# Patient Record
Sex: MALE | Race: WHITE | NOT HISPANIC OR LATINO | Employment: UNEMPLOYED | URBAN - METROPOLITAN AREA
[De-identification: names, ages, dates, MRNs, and addresses within clinical notes are randomized per-mention and may not be internally consistent; named-entity substitution may affect disease eponyms.]

---

## 2017-04-05 ENCOUNTER — HOSPITAL ENCOUNTER (OUTPATIENT)
Dept: LAB | Facility: MEDICAL CENTER | Age: 62
End: 2017-04-05
Attending: NURSE PRACTITIONER
Payer: MEDICAID

## 2017-04-05 DIAGNOSIS — E78.49 OTHER HYPERLIPIDEMIA: ICD-10-CM

## 2017-04-05 DIAGNOSIS — Z13.29 SCREENING FOR THYROID DISORDER: ICD-10-CM

## 2017-04-05 DIAGNOSIS — E11.9 DIABETES MELLITUS WITHOUT COMPLICATION (HCC): ICD-10-CM

## 2017-04-05 DIAGNOSIS — R97.20 ELEVATED PSA: ICD-10-CM

## 2017-04-05 DIAGNOSIS — Z13.21 ENCOUNTER FOR VITAMIN DEFICIENCY SCREENING: ICD-10-CM

## 2017-04-05 LAB
25(OH)D3 SERPL-MCNC: 20 NG/ML (ref 30–100)
ALBUMIN SERPL BCP-MCNC: 4.4 G/DL (ref 3.2–4.9)
ALBUMIN/GLOB SERPL: 1.4 G/DL
ALP SERPL-CCNC: 89 U/L (ref 30–99)
ALT SERPL-CCNC: 29 U/L (ref 2–50)
ANION GAP SERPL CALC-SCNC: 14 MMOL/L (ref 0–11.9)
AST SERPL-CCNC: 20 U/L (ref 12–45)
BILIRUB SERPL-MCNC: 0.6 MG/DL (ref 0.1–1.5)
BUN SERPL-MCNC: 16 MG/DL (ref 8–22)
CALCIUM SERPL-MCNC: 10 MG/DL (ref 8.5–10.5)
CHLORIDE SERPL-SCNC: 100 MMOL/L (ref 96–112)
CHOLEST SERPL-MCNC: 221 MG/DL (ref 100–199)
CO2 SERPL-SCNC: 22 MMOL/L (ref 20–33)
CREAT SERPL-MCNC: 1.11 MG/DL (ref 0.5–1.4)
CREAT UR-MCNC: 144.5 MG/DL
EST. AVERAGE GLUCOSE BLD GHB EST-MCNC: 303 MG/DL
GFR SERPL CREATININE-BSD FRML MDRD: >60 ML/MIN/1.73 M 2
GLOBULIN SER CALC-MCNC: 3.2 G/DL (ref 1.9–3.5)
GLUCOSE SERPL-MCNC: 265 MG/DL (ref 65–99)
HBA1C MFR BLD: 12.2 % (ref 0–5.6)
HDLC SERPL-MCNC: 47 MG/DL
LDLC SERPL CALC-MCNC: 124 MG/DL
MICROALBUMIN UR-MCNC: 7.6 MG/DL
MICROALBUMIN/CREAT UR: 53 MG/G (ref 0–30)
POTASSIUM SERPL-SCNC: 3.9 MMOL/L (ref 3.6–5.5)
PROT SERPL-MCNC: 7.6 G/DL (ref 6–8.2)
PSA SERPL-MCNC: 7.08 NG/ML (ref 0–4)
SODIUM SERPL-SCNC: 136 MMOL/L (ref 135–145)
TRIGL SERPL-MCNC: 250 MG/DL (ref 0–149)
TSH SERPL DL<=0.005 MIU/L-ACNC: 1.36 UIU/ML (ref 0.3–3.7)

## 2017-04-05 PROCEDURE — 84443 ASSAY THYROID STIM HORMONE: CPT

## 2017-04-05 PROCEDURE — 36415 COLL VENOUS BLD VENIPUNCTURE: CPT

## 2017-04-05 PROCEDURE — 82306 VITAMIN D 25 HYDROXY: CPT

## 2017-04-05 PROCEDURE — 80061 LIPID PANEL: CPT

## 2017-04-05 PROCEDURE — 82570 ASSAY OF URINE CREATININE: CPT

## 2017-04-05 PROCEDURE — 82043 UR ALBUMIN QUANTITATIVE: CPT

## 2017-04-05 PROCEDURE — 80053 COMPREHEN METABOLIC PANEL: CPT

## 2017-04-05 PROCEDURE — 83036 HEMOGLOBIN GLYCOSYLATED A1C: CPT

## 2017-04-05 PROCEDURE — 84153 ASSAY OF PSA TOTAL: CPT

## 2017-04-10 RX ORDER — INSULIN GLARGINE 100 [IU]/ML
INJECTION, SOLUTION SUBCUTANEOUS
Qty: 10 ML | Refills: 2 | Status: SHIPPED | OUTPATIENT
Start: 2017-04-10 | End: 2017-04-18 | Stop reason: SDUPTHER

## 2017-04-18 ENCOUNTER — OFFICE VISIT (OUTPATIENT)
Dept: MEDICAL GROUP | Facility: MEDICAL CENTER | Age: 62
End: 2017-04-18
Attending: NURSE PRACTITIONER
Payer: MEDICAID

## 2017-04-18 VITALS
HEART RATE: 100 BPM | BODY MASS INDEX: 28.12 KG/M2 | DIASTOLIC BLOOD PRESSURE: 90 MMHG | SYSTOLIC BLOOD PRESSURE: 140 MMHG | OXYGEN SATURATION: 96 % | RESPIRATION RATE: 16 BRPM | WEIGHT: 196 LBS | TEMPERATURE: 97.6 F

## 2017-04-18 DIAGNOSIS — Z79.4 TYPE 2 DIABETES MELLITUS WITHOUT COMPLICATION, WITH LONG-TERM CURRENT USE OF INSULIN (HCC): ICD-10-CM

## 2017-04-18 DIAGNOSIS — E11.9 TYPE 2 DIABETES MELLITUS WITHOUT COMPLICATION, WITH LONG-TERM CURRENT USE OF INSULIN (HCC): ICD-10-CM

## 2017-04-18 DIAGNOSIS — E55.9 VITAMIN D DEFICIENCY: ICD-10-CM

## 2017-04-18 DIAGNOSIS — R97.20 ELEVATED PSA: ICD-10-CM

## 2017-04-18 DIAGNOSIS — I10 ESSENTIAL HYPERTENSION: ICD-10-CM

## 2017-04-18 DIAGNOSIS — E11.9 DIABETES MELLITUS WITHOUT COMPLICATION (HCC): ICD-10-CM

## 2017-04-18 DIAGNOSIS — E78.49 OTHER HYPERLIPIDEMIA: ICD-10-CM

## 2017-04-18 PROCEDURE — 99213 OFFICE O/P EST LOW 20 MIN: CPT | Performed by: NURSE PRACTITIONER

## 2017-04-18 PROCEDURE — 99214 OFFICE O/P EST MOD 30 MIN: CPT | Performed by: NURSE PRACTITIONER

## 2017-04-18 RX ORDER — IBUPROFEN 200 MG
TABLET ORAL
Qty: 30 EACH | Refills: 5 | Status: SHIPPED | OUTPATIENT
Start: 2017-04-18 | End: 2018-04-02

## 2017-04-18 RX ORDER — LISINOPRIL 40 MG/1
40 TABLET ORAL DAILY
Qty: 30 TAB | Refills: 3 | Status: SHIPPED | OUTPATIENT
Start: 2017-04-18 | End: 2018-01-05 | Stop reason: SDUPTHER

## 2017-04-18 RX ORDER — LANCETS
1 EACH MISCELLANEOUS DAILY
Qty: 50 EACH | Refills: 3 | Status: SHIPPED | OUTPATIENT
Start: 2017-04-18 | End: 2018-04-02 | Stop reason: SDUPTHER

## 2017-04-18 RX ORDER — ATORVASTATIN CALCIUM 20 MG/1
20 TABLET, FILM COATED ORAL DAILY
Qty: 30 TAB | Refills: 5 | Status: SHIPPED | OUTPATIENT
Start: 2017-04-18 | End: 2018-04-02 | Stop reason: SDUPTHER

## 2017-04-18 RX ORDER — BLOOD-GLUCOSE METER
1 EACH MISCELLANEOUS DAILY
Qty: 1 DEVICE | Refills: 0 | Status: SHIPPED | OUTPATIENT
Start: 2017-04-18 | End: 2018-08-09

## 2017-04-18 RX ORDER — INSULIN GLARGINE 100 [IU]/ML
30 INJECTION, SOLUTION SUBCUTANEOUS
Qty: 10 ML | Refills: 5 | Status: SHIPPED | OUTPATIENT
Start: 2017-04-18 | End: 2018-04-02

## 2017-04-18 RX ORDER — SYRINGE AND NEEDLE,INSULIN,1ML 29 G X1/2"
SYRINGE, EMPTY DISPOSABLE MISCELLANEOUS
Qty: 30 EACH | Refills: 2 | Status: SHIPPED | OUTPATIENT
Start: 2017-04-18 | End: 2018-08-09

## 2017-04-18 ASSESSMENT — PAIN SCALES - GENERAL: PAINLEVEL: NO PAIN

## 2017-04-18 NOTE — ASSESSMENT & PLAN NOTE
WE discussed his recent lipid panel showing   Results for HOSSEIN PRINCE (MRN [id number]) as of 4/18/2017 13:03   Ref. Range 4/5/2017 15:16   Cholesterol,Tot Latest Ref Range: 100-199 mg/dL 221 (H)   Triglycerides Latest Ref Range: 0-149 mg/dL 250 (H)   HDL Latest Ref Range: >=40 mg/dL 47   LDL Latest Ref Range: <100 mg/dL 124 (H)     He reports he is taking Atorvastatin 10 mg/day.  Denies myalgias.

## 2017-04-18 NOTE — MR AVS SNAPSHOT
[place]   2017 1:10 PM   Office Visit   MRN: [id number]    [facility]   Dept Phone:  [phone]    Description:  Male : 1955   Provider:  NAE Davis           Reason for Visit     Follow-Up diabet      Allergies as of 2017     No Known Allergies      You were diagnosed with     Elevated PSA   [983137]       Other hyperlipidemia   [3716205]       Essential hypertension   [0076019]       Vitamin D deficiency   [0255402]       Type 2 diabetes mellitus without complication, with long-term current use of insulin (CMS-[facility])   [4993335]       Diabetes mellitus without complication (CMS-[facility])   [431309]         Vital Signs     Blood Pressure Pulse Temperature Respirations Weight Oxygen Saturation    140/90 mmHg 100 36.4 °C (97.6 °F) 16 88.905 kg (196 lb) 96%    Smoking Status                   Never Smoker            Basic Information     Date Of Birth Sex Race Ethnicity Preferred Language    1955 Male White Non- English      Problem List              ICD-10-CM Priority Class Noted - Resolved    Type 2 diabetes mellitus without complication (CMS-[facility]) E11.9   2014 - Present    Hypertension I10   2014 - Present    Varicose veins of right lower extremity with pain I83.811   2014 - Present    Excessive cerumen in left ear canal H61.22   2014 - Present    Hyperlipidemia E78.5   2015 - Present    Elevated PSA R97.20   2015 - Present    Ringing in the ears H93.19   2015 - Present    Cold J00   2015 - Present    Vertigo R42   2016 - Present    Vitamin D deficiency E55.9   2017 - Present      Health Maintenance        Date Due Completion Dates    DIABETES MONOFILAMENT / LE EXAM 1956 ---    RETINAL SCREENING 10/17/1972 ---    IMM DTaP/Tdap/Td Vaccine (1 - Tdap) 10/17/1973 ---    IMM PNEUMOCOCCAL 19-64 (ADULT) MEDIUM RISK SERIES (1 of 1 - PPSV23) 10/17/1973 ---    IMM ZOSTER VACCINE 10/17/2014 ---    COLON CANCER SCREENING ANNUAL FIT 3/2/2017 3/2/2016, 1/22/2015    A1C SCREENING 10/5/2017 4/5/2017, 8/31/2016, 6/9/2015, 1/27/2015    FASTING LIPID PROFILE 4/5/2018 4/5/2017, 8/31/2016, 1/27/2015    URINE ACR / MICROALBUMIN 4/5/2018 4/5/2017, 1/29/2015    SERUM CREATININE 4/5/2018 4/5/2017, 8/31/2016, 1/27/2015, 6/3/2006            Current Immunizations     Influenza Vaccine Quad Inj (Preserved) 10/22/2015      Below and/or attached are the medications your provider expects you to take. Review all of your home medications and newly ordered medications with your provider and/or pharmacist. Follow medication instructions as directed by your provider and/or pharmacist. Please keep your medication list with you and share with your provider. Update the information when medications are discontinued, doses are changed, or new medications (including over-the-counter products) are added; and carry medication information at all times in the event of emergency situations     Allergies:  No Known Allergies          Medications  Valid as of: April 18, 2017 -  1:24 PM    Generic Name Brand Name Tablet Size Instructions for use    Atorvastatin Calcium (Tab) LIPITOR 20 MG Take 1 Tab by mouth every day.        Blood Glucose Monitoring Suppl (Misc) Blood Glucose Monitoring Suppl SUPPLIES DM testing supplies: glucometer, test strips, lancets  Dispense brand covered by patients insurance  Testing frequency: BID, fasting  250.00        Blood Glucose Monitoring Suppl (Device) PRECISION QID MONITOR  by Does not apply route.        Blood Glucose Monitoring Suppl (Device) TRUE METRIX AIR GLUCOSE METER  1 Each by Does not apply route every day.        Cholecalciferol (Cap) Cholecalciferol 2000 UNIT Take 1 Cap by mouth every day.        Glucose Blood (Strip) glucose blood  1 Strip by Other route every day.        Glucose Blood (Strip) glucose blood  1 Each by Other route as needed.        Glucose Blood (Strip) glucose blood  1 Strip by Other  "route as needed.        Insulin Glargine (Solution) LANTUS 100 UNIT/ML Inject 20 Units as instructed every bedtime.        Insulin Glargine (Solution) LANTUS 100 UNIT/ML Inject 30 Units as instructed every bedtime.        Insulin Syringe-Needle U-100 (Misc) INSULIN SYRINGE .5CC/29G 29G X 1/2\" 0.5 ML Pt to use with Lantus Insulin, one injection every night        Lancets (Misc) TECHLITE LANCETS  1 Each by In Vitro route every day.        Lisinopril (Tab) PRINIVIL, ZESTRIL 40 MG Take 1 Tab by mouth every day.        Meclizine HCl (Tab) ANTIVERT 25 MG Take 1 Tab by mouth at bedtime as needed for Dizziness or Vertigo.        MetFORMIN HCl (Tab) GLUCOPHAGE 850 MG Take 1 Tab by mouth 3 times a day, with meals.        .                 Medicines prescribed today were sent to:     [facility] [address] [address]    Phone: [phone] Fax: [phone]    Open 24 Hours?: No      Medication refill instructions:       If your prescription bottle indicates you have medication refills left, it is not necessary to call your provider’s office. Please contact your pharmacy and they will refill your medication.    If your prescription bottle indicates you do not have any refills left, you may request refills at any time through one of the following ways: The online [employer] system (except Urgent Care), by calling your provider’s office, or by asking your pharmacy to contact your provider’s office with a refill request. Medication refills are processed only during regular business hours and may not be available until the next business day. Your provider may request additional information or to have a follow-up visit with you prior to refilling your medication.   *Please Note: Medication refills are assigned a new Rx number when refilled electronically. Your pharmacy may indicate that no refills were authorized even though a new prescription for the same medication is available at " the pharmacy. Please request the medicine by name with the pharmacy before contacting your provider for a refill.           MyChart Status: Patient Declined

## 2017-04-18 NOTE — ASSESSMENT & PLAN NOTE
Pt has known elevated PSA but in the past has refused to see Urologist. I reviewed his recent PSA= 7.08 and recommended referral to urology for check for prostate cancer. We discussed this and patient not interested in Urology referral.  Denies dysuria or any trouble in urination.

## 2017-04-18 NOTE — ASSESSMENT & PLAN NOTE
/90 in clinic today. Known hx of HTN. . Pt stating that they have been taking their medication (lisinopril) as prescribed without adverse effect except did not take his BP med today. Pt denies HA, vision changes, neurologic deficits, CP, SOB.

## 2017-04-18 NOTE — PROGRESS NOTES
"    Chief Complaint: DM follow up, Glucometer not working.    HPI:  Louis presents to the clinic for follow up on multiple medical conditions.    His PMH includes:  DM-2, poor control.  Hyperlipidemia  HTN  Elevated PSA but Pt not amenable to further eval or referral to urology  Cerumen Impactions  Ringing in Ears  Varicose Vein of RLE    Review of Records show  12/14/16 Clinic Visit for DM check, Increased Lantus to 30 units at hs.  9/12/16 clinic Visit for Results Review, Pt to continue Metformin  850 mgTID and increase Lantus at hs to 25 units and Increase lantus 2 units every 2 days if BS maintaining >200. Stop titration if having episodes of hypoglycemia.  5/23/16 Last Clinic Visit to re-establish as Pt had been in [facility] x 6 months and missed his f/u appt's until May, 2016.--> labs ordered    [place]  Report shows  No entries    Results Review-  Most Recent Labs- 4/5/17  CMP normal except BS= 265, GFR good, A1C= 12.2 ( 303 Avg)  TSH= 1.360  Cholesterol Total= 221, Triglycerides= 250, HDL= 47, LDL= 124, Microalbumin/Cr Ratio= 53  Vitamin D= 20, PSA= 7.08     Past Labs  8/31/16 Labs: CBC normal except slight decr Hgb= 13.8, CMP normal except fasting BS= 123,                          A1C= 13.8 ( ~ 349 avg BS), Cholesterol Total= 195, Triglycerides= 110, HDL= 52, LDL= 121        Type 2 diabetes mellitus without complication (CMS-[facility])  Pt has known DM-2.   During his Dec visit here we increased his Lantus to 30 units at hs, and instructed him to continue Metformin 850  Sometimes \"2-3 x per day\"    Today I reviewed his lab work including A1C= 12.2 ( an improvement from 8/31/16 A1C= 13.8) but still poorly controlled.    Pt reports he is not testing BSin morning as \"can't\"  As he has 3 meters but \"none work\". Shows me [place], TrueResult and Precision Xtra meter and varied test strips.       Denies any known lows    Elevated PSA  Pt has known elevated PSA but in the past has refused to see Urologist. I reviewed his " recent PSA= 7.08 and recommended referral to urology for check for prostate cancer. We discussed this and patient not interested in Urology referral.  Denies dysuria or any trouble in urination.    Hyperlipidemia  WE discussed his recent lipid panel showing   Results for HOSSEIN PRINCE (MRN [id number]) as of 4/18/2017 13:03   Ref. Range 4/5/2017 15:16   Cholesterol,Tot Latest Ref Range: 100-199 mg/dL 221 (H)   Triglycerides Latest Ref Range: 0-149 mg/dL 250 (H)   HDL Latest Ref Range: >=40 mg/dL 47   LDL Latest Ref Range: <100 mg/dL 124 (H)     He reports he is taking Atorvastatin 10 mg/day.  Denies myalgias.    Hypertension  /90 in clinic today. Known hx of HTN. . Pt stating that they have been taking their medication (lisinopril) as prescribed without adverse effect except did not take his BP med today. Pt denies HA, vision changes, neurologic deficits, CP, SOB.     Vitamin D deficiency  We discussed his low vitamin d level and need for supplement  Pt agrees.      Patient Active Problem List    Diagnosis Date Noted   • Vitamin D deficiency 04/18/2017   • Vertigo 05/23/2016   • Cold 09/17/2015   • Ringing in the ears 06/09/2015   • Hyperlipidemia 04/09/2015   • Elevated PSA 04/09/2015   • Type 2 diabetes mellitus without complication (CMS-[facility]) 11/06/2014   • Hypertension 11/06/2014   • Varicose veins of right lower extremity with pain 11/06/2014   • Excessive cerumen in left ear canal 11/06/2014       Allergies:Review of patient's allergies indicates no known allergies.    Current Outpatient Prescriptions   Medication Sig Dispense Refill   • atorvastatin (LIPITOR) 20 MG Tab Take 1 Tab by mouth every day. 30 Tab 5   • Cholecalciferol 2000 UNIT Cap Take 1 Cap by mouth every day. 30 Cap 11   • insulin glargine (LANTUS) 100 UNIT/ML Solution Inject 30 Units as instructed every bedtime. 10 mL 5   • TECHLITE LANCETS Misc 1 Each by In Vitro route every day. 50 Each 3   • metformin (GLUCOPHAGE) 850 MG Tab Take 1 Tab  "by mouth 3 times a day, with meals. 90 Tab 3   • lisinopril (PRINIVIL, ZESTRIL) 40 MG tablet Take 1 Tab by mouth every day. 30 Tab 3   • Blood Glucose Monitoring Suppl (TRUE METRIX AIR GLUCOSE METER) Device 1 Each by Does not apply route every day. 1 Device 0   • glucose blood (TRUE METRIX BLOOD GLUCOSE TEST) strip 1 Strip by Other route as needed. 50 Strip 5   • INSULIN SYRINGE .5CC/29G 29G X 1/2\" 0.5 ML Misc Pt to use with Lantus Insulin, one injection every night 30 Each 5   • insulin glargine (LANTUS) 100 UNIT/ML Solution Inject 20 Units as instructed every bedtime. 10 mL 3   • meclizine (ANTIVERT) 25 MG Tab Take 1 Tab by mouth at bedtime as needed for Dizziness or Vertigo. 20 Tab 1   • Blood Glucose Monitoring Suppl SUPPLIES MISC DM testing supplies: glucometer, test strips, lancets  Dispense brand covered by patients insurance  Testing frequency: BID, fasting  250.00 1 Each 11   • Blood Glucose Monitoring Suppl (PRECISION QID MONITOR) Device by Does not apply route.     • glucose blood (PRECISION QID TEST) strip 1 Each by Other route as needed.     • glucose blood (TRUETEST TEST) strip 1 Strip by Other route every day. 30 Strip 3     No current facility-administered medications for this visit.       Social History   Substance Use Topics   • Smoking status: Never Smoker    • Smokeless tobacco: Never Used   • Alcohol Use: Yes      Comment: once in a while. A couple beers per week.       Family History   Problem Relation Age of Onset   • Stroke Mother    • Heart Disease Mother    • Diabetes Mother    • Lung Disease Father        ROS:  Review of Systems   See HPI Above        Exam:  Blood pressure 140/90, pulse 100, temperature 36.4 °C (97.6 °F), resp. rate 16, weight 88.905 kg (196 lb), SpO2 96 %.  General:  Well nourished, well developed male in NAD  HENT:Head is grossly normal. PERRL.  Neck: Supple. Trachea is midline.  Pulmonary: Clear to ausculation .  Normal effort. No rales, ronchi, or wheezing. " "  Cardiovascular: Regular rate and rhythm.  Abdomen-Abdomen is soft, No tenderness.  Upper extremities- Strong = . Good ROM  Lower extremities- neg for edema, redness, tenderness.  Neuro- A & O x 4. Speech clear and slow but appropriate.     Current medications, allergies, and problem list reviewed with patient and updated in  [facility] today.    Assessment/Plan:  1. Elevated PSA  Pt refuses referral to Urology despite discussion of dangers or prostate CA.   2. Other hyperlipidemia  atorvastatin (LIPITOR) 20 MG Tab- increase in dose   3. Essential hypertension  lisinopril (PRINIVIL, ZESTRIL) 40 MG tablet   4. Vitamin D deficiency  Cholecalciferol 2000 UNIT Cap-START   5. Type 2 diabetes mellitus without complication, with long-term current use of insulin (CMS-HCC)  TECHLITE LANCETS Misc    metformin (GLUCOPHAGE) 850 MG Tab   6. Diabetes mellitus without complication (CMS-HCC)  insulin glargine (LANTUS) 100 UNIT/ML Solution  Test every morning fasting BS and log.  Continue Lantus at 30 units at hs. And be more consistent with Metformin TID. Decrease sugar and carb intake.    TECHLITE LANCETS Misc    Blood Glucose Monitoring Suppl (TRUE METRIX AIR GLUCOSE METER) Device    glucose blood (TRUE METRIX BLOOD GLUCOSE TEST) strip    INSULIN SYRINGE .5CC/29G 29G X 1/2\" 0.5 ML Misc   Follow up in 2 months. Call or return if questions, concerns, or worsening condition.  "

## 2017-04-18 NOTE — ASSESSMENT & PLAN NOTE
"Pt has known DM-2.   During his Dec visit here we increased his Lantus to 30 units at hs, and instructed him to continue Metformin 850  Sometimes \"2-3 x per day\"    Today I reviewed his lab work including A1C= 12.2 ( an improvement from 8/31/16 A1C= 13.8) but still poorly controlled.    Pt reports he is not testing BSin morning as \"can't\"  As he has 3 meters but \"none work\". Shows me [place], TrueResult and Precision Xtra meter and varied test strips.       Denies any known lows  "

## 2017-05-01 DIAGNOSIS — Z79.4 TYPE 2 DIABETES MELLITUS WITHOUT COMPLICATION, WITH LONG-TERM CURRENT USE OF INSULIN (HCC): ICD-10-CM

## 2017-05-01 DIAGNOSIS — E11.9 TYPE 2 DIABETES MELLITUS WITHOUT COMPLICATION, WITH LONG-TERM CURRENT USE OF INSULIN (HCC): ICD-10-CM

## 2017-05-01 RX ORDER — SYRINGE AND NEEDLE,INSULIN,1ML 29 G X1/2"
SYRINGE, EMPTY DISPOSABLE MISCELLANEOUS
Qty: 0 EACH | Refills: 1 | OUTPATIENT
Start: 2017-05-01

## 2017-05-01 RX ORDER — PEN NEEDLE, DIABETIC 30 GX5/16"
1 NEEDLE, DISPOSABLE MISCELLANEOUS DAILY
Qty: 50 EACH | Refills: 2 | Status: SHIPPED | OUTPATIENT
Start: 2017-05-01 | End: 2017-11-29 | Stop reason: SDUPTHER

## 2017-11-29 DIAGNOSIS — Z79.4 TYPE 2 DIABETES MELLITUS WITHOUT COMPLICATION, WITH LONG-TERM CURRENT USE OF INSULIN (HCC): ICD-10-CM

## 2017-11-29 DIAGNOSIS — E11.9 TYPE 2 DIABETES MELLITUS WITHOUT COMPLICATION, WITH LONG-TERM CURRENT USE OF INSULIN (HCC): ICD-10-CM

## 2017-11-30 RX ORDER — FLURBIPROFEN SODIUM 0.3 MG/ML
SOLUTION/ DROPS OPHTHALMIC
Qty: 100 EACH | Refills: 2 | Status: SHIPPED | OUTPATIENT
Start: 2017-11-30 | End: 2018-04-02 | Stop reason: SDUPTHER

## 2017-11-30 RX ORDER — INSULIN GLARGINE 100 [IU]/ML
INJECTION, SOLUTION SUBCUTANEOUS
Qty: 15 ML | Refills: 2 | Status: SHIPPED | OUTPATIENT
Start: 2017-11-30 | End: 2018-04-02 | Stop reason: SDUPTHER

## 2018-01-05 DIAGNOSIS — I10 ESSENTIAL HYPERTENSION: ICD-10-CM

## 2018-01-08 RX ORDER — LISINOPRIL 40 MG/1
TABLET ORAL
Qty: 30 TAB | Refills: 4 | Status: SHIPPED | OUTPATIENT
Start: 2018-01-08 | End: 2018-04-02 | Stop reason: SDUPTHER

## 2018-04-02 ENCOUNTER — OFFICE VISIT (OUTPATIENT)
Dept: MEDICAL GROUP | Facility: MEDICAL CENTER | Age: 63
End: 2018-04-02
Attending: NURSE PRACTITIONER
Payer: MEDICAID

## 2018-04-02 VITALS
DIASTOLIC BLOOD PRESSURE: 88 MMHG | OXYGEN SATURATION: 98 % | HEIGHT: 71 IN | HEART RATE: 16 BPM | RESPIRATION RATE: 20 BRPM | TEMPERATURE: 98 F | SYSTOLIC BLOOD PRESSURE: 140 MMHG | BODY MASS INDEX: 28.7 KG/M2 | WEIGHT: 205 LBS

## 2018-04-02 DIAGNOSIS — E55.9 VITAMIN D DEFICIENCY: ICD-10-CM

## 2018-04-02 DIAGNOSIS — I10 ESSENTIAL HYPERTENSION: ICD-10-CM

## 2018-04-02 DIAGNOSIS — E78.49 OTHER HYPERLIPIDEMIA: ICD-10-CM

## 2018-04-02 DIAGNOSIS — E11.9 TYPE 2 DIABETES MELLITUS WITHOUT COMPLICATION, WITH LONG-TERM CURRENT USE OF INSULIN (HCC): ICD-10-CM

## 2018-04-02 DIAGNOSIS — Z13.21 ENCOUNTER FOR VITAMIN DEFICIENCY SCREENING: ICD-10-CM

## 2018-04-02 DIAGNOSIS — Z79.4 TYPE 2 DIABETES MELLITUS WITHOUT COMPLICATION, WITH LONG-TERM CURRENT USE OF INSULIN (HCC): ICD-10-CM

## 2018-04-02 DIAGNOSIS — Z13.29 SCREENING FOR THYROID DISORDER: ICD-10-CM

## 2018-04-02 DIAGNOSIS — E11.9 DIABETES MELLITUS WITHOUT COMPLICATION (HCC): ICD-10-CM

## 2018-04-02 DIAGNOSIS — R09.81 NASAL CONGESTION: ICD-10-CM

## 2018-04-02 PROCEDURE — 99213 OFFICE O/P EST LOW 20 MIN: CPT | Performed by: NURSE PRACTITIONER

## 2018-04-02 PROCEDURE — 99214 OFFICE O/P EST MOD 30 MIN: CPT | Performed by: NURSE PRACTITIONER

## 2018-04-02 RX ORDER — INSULIN GLARGINE 100 [IU]/ML
30 INJECTION, SOLUTION SUBCUTANEOUS
Qty: 10 ML | Refills: 5 | Status: CANCELLED | OUTPATIENT
Start: 2018-04-02

## 2018-04-02 RX ORDER — LORATADINE 10 MG/1
10 TABLET ORAL DAILY
Qty: 30 TAB | Refills: 2 | Status: SHIPPED | OUTPATIENT
Start: 2018-04-02 | End: 2018-10-10 | Stop reason: SDUPTHER

## 2018-04-02 RX ORDER — LANCETS
1 EACH MISCELLANEOUS DAILY
Qty: 50 EACH | Refills: 5 | Status: SHIPPED | OUTPATIENT
Start: 2018-04-02 | End: 2018-08-09

## 2018-04-02 RX ORDER — ATORVASTATIN CALCIUM 20 MG/1
20 TABLET, FILM COATED ORAL DAILY
Qty: 30 TAB | Refills: 5 | Status: SHIPPED | OUTPATIENT
Start: 2018-04-02 | End: 2018-10-10 | Stop reason: SDUPTHER

## 2018-04-02 RX ORDER — LISINOPRIL 40 MG/1
40 TABLET ORAL DAILY
Qty: 30 TAB | Refills: 5 | Status: SHIPPED | OUTPATIENT
Start: 2018-04-02 | End: 2018-10-10 | Stop reason: SDUPTHER

## 2018-04-02 NOTE — ASSESSMENT & PLAN NOTE
History of poorly controlled diabetes type 2. Is on metformin 853 times a day and Lantus insulin 30 units at bedtime.  Patient reports he needs refills on DM meds as getting low.  Discussed importance of him taking fasting BS every am, but Pt reports rarely checks.  Denies any known hypoglycemic episodes.

## 2018-04-02 NOTE — ASSESSMENT & PLAN NOTE
"BP     140/88          in clinic today. Known hx of HTN.  Pt stating that they have been taking their medication until recently \"ran out\" as prescribed without adverse effect. Pt denies HA, vision changes, neurologic deficits, CP, SOB.   "

## 2018-04-02 NOTE — PROGRESS NOTES
"Chief Complaint: No chief complaint on file.      HPI:  Louis is here today for a follow-up on DM, HTN, Med Refills.    His PMH includes:  DM-2, poor control.  Hyperlipidemia  HTN  Elevated PSA but Pt not amenable to further eval or referral to urology  Cerumen Impactions  Ringing in Ears  Varicose Vein of RLE     Review of Records show  4/18/17 Clinic for F/u in DM, Glucometer problems. (4/5/17 A1c= 12.2) Pt reports continuing Metformin 850 TID, Lantus 30 u at hs  Discussed Elevated PSA but Pt not willing for Urology Referral. INcrease in dose of Lipitor for ongoing Hyperlipidemia. RX New Meter  True Metrix w supplies. RX Vit D.    12/14/16 Clinic Visit for DM check, Increased Lantus to 30 units at hs.  9/12/16 clinic Visit for Results Review, Pt to continue Metformin  850 mgTID and increase Lantus at hs to 25 units and Increase lantus 2 units every 2 days if BS maintaining >200. Stop titration if having episodes of hypoglycemia.  5/23/16 Last Clinic Visit to re-establish as Pt had been in group home x 6 months and missed his f/u appt's until May, 2016.--> labs ordered     [place]:  No Entries    Type 2 diabetes mellitus without complication (CMS-[facility])  History of poorly controlled diabetes type 2. Is on metformin 853 times a day and Lantus insulin 30 units at bedtime.  Patient reports he needs refills on DM meds as getting low.  Discussed importance of him taking fasting BS every am, but Pt reports rarely checks.  Denies any known hypoglycemic episodes.      Hypertension  BP     140/88          in clinic today. Known hx of HTN.  Pt stating that they have been taking their medication until recently \"ran out\" as prescribed without adverse effect. Pt denies HA, vision changes, neurologic deficits, CP, SOB.     Hyperlipidemia  Pt unsure if he is taking Atorvastatin or ran out.  We discussed importance of him keeping track of his medications.  In addition recommended lab results as no labs since April 2017.  Pt " "agrees.      Patient Active Problem List    Diagnosis Date Noted   • Vitamin D deficiency 04/18/2017   • Vertigo 05/23/2016   • Cold 09/17/2015   • Ringing in the ears 06/09/2015   • Hyperlipidemia 04/09/2015   • Elevated PSA 04/09/2015   • Type 2 diabetes mellitus without complication (CMS-[facility]) 11/06/2014   • Hypertension 11/06/2014   • Varicose veins of right lower extremity with pain 11/06/2014   • Excessive cerumen in left ear canal 11/06/2014       Allergies:Patient has no known allergies.    Medicines as of today:  Current Outpatient Prescriptions   Medication Sig Dispense Refill   • lisinopril (PRINIVIL, ZESTRIL) 40 MG tablet Take 1 Tab by mouth every day. 30 Tab 5   • metFORMIN (GLUCOPHAGE) 850 MG Tab Take 1 Tab by mouth 3 times a day, with meals. 90 Tab 5   • atorvastatin (LIPITOR) 20 MG Tab Take 1 Tab by mouth every day. 30 Tab 5   • Insulin Pen Needle (B-D UF III MINI PEN NEEDLES) 31G X 5 MM Misc Use w lantus Insulin daily 100 Each 2   • insulin glargine (BASAGLAR KWIKPEN) 100 UNIT/ML Solution Pen-injector injection Inject 30 Units as instructed every evening. 15 mL 5   • Cholecalciferol 2000 UNIT Cap Take 1 Cap by mouth every day. 30 Cap 11   • TECHLITE LANCETS Misc 1 Each by In Vitro route every day. 50 Each 5   • glucose blood (TRUE METRIX BLOOD GLUCOSE TEST) strip 1 Strip by Other route as needed. 50 Strip 5   • loratadine (CLARITIN) 10 MG Tab Take 1 Tab by mouth every day. 30 Tab 2   • Blood Glucose Monitoring Suppl (TRUE METRIX AIR GLUCOSE METER) Device 1 Each by Does not apply route every day. 1 Device 0   • ULTICARE INSULIN SYRINGE 29G X 1/2\" 0.5 ML Misc Pt to use with Lantus Insulin, one injection every night 30 Each 2   • glucose blood (TRUETEST TEST) strip 1 Strip by Other route every day. 30 Strip 3     No current facility-administered medications for this visit.        Social History   Substance Use Topics   • Smoking status: Never Smoker   • Smokeless tobacco: Never Used   • Alcohol use Yes " "     Comment: once in a while. A couple beers per week.       Past Medical History:   Diagnosis Date   • Hyperlipidemia    • Hypertension    • Type II or unspecified type diabetes mellitus without mention of complication, not stated as uncontrolled        Family History   Problem Relation Age of Onset   • Stroke Mother    • Heart Disease Mother    • Diabetes Mother    • Lung Disease Father        ROS:  Review of Systems   See HPI Above    Exam:  Blood pressure 140/88, pulse (!) 16, temperature 36.7 °C (98 °F), resp. rate 20, height 1.803 m (5' 11\"), weight 93 kg (205 lb), SpO2 98 %. Body mass index is 28.59 kg/m².    General:  Well nourished, well developed male in NAD, disheveled appearance/dress  HENT:Head is grossly normal. PERRL.  Neck: Supple. Trachea is midline.  Pulmonary: Clear to ausculation .  Normal effort. No rales, ronchi, or wheezing.   Cardiovascular: Regular rate and rhythm.  Abdomen-Abdomen is soft, No tenderness.  Upper extremities- Good ROM  Lower extremities- neg for edema, redness, tenderness.  Neuro- A & O x 4. Speech clear.    Current medications, allergies, and problem list reviewed with patient and updated in [facility] today.    Assessment/Plan:  1. Type 2 diabetes mellitus without complication, with long-term current use of insulin (CMS-HCC)  HEMOGLOBIN A1C  RX, Lantus Kwik Pen     LIPID PROFILE    MICROALBUMIN CREAT RATIO URINE (LAB COLLECT)    REFERRAL TO OPHTHALMOLOGY    metFORMIN (GLUCOPHAGE) 850 MG Tab    Insulin Pen Needle (B-D UF III MINI PEN NEEDLES) 31G X 5 MM Misc    TECHLITE LANCETS Misc   2. Essential hypertension  COMP METABOLIC PANEL    lisinopril (PRINIVIL, ZESTRIL) 40 MG tablet   3. Encounter for vitamin deficiency screening  VITAMIN D,25 HYDROXY    VITAMIN B12   4. Screening for thyroid disorder  TSH   5. Other hyperlipidemia  atorvastatin (LIPITOR) 20 MG Tab   6. Vitamin D deficiency  Cholecalciferol 2000 UNIT Cap   7. Diabetes mellitus without complication (CMS-HCC)  TECHLITE " LANCETS Misc    glucose blood (TRUE METRIX BLOOD GLUCOSE TEST) strip   8. Nasal congestion  loratadine (CLARITIN) 10 MG Tab       Return in about 6 weeks (around 5/14/2018) for lab results.

## 2018-04-02 NOTE — ASSESSMENT & PLAN NOTE
Pt unsure if he is taking Atorvastatin or ran out.  We discussed importance of him keeping track of his medications.  In addition recommended lab results as no labs since April 2017.  Pt agrees.

## 2018-05-03 ENCOUNTER — HOSPITAL ENCOUNTER (OUTPATIENT)
Dept: LAB | Facility: MEDICAL CENTER | Age: 63
End: 2018-05-03
Attending: NURSE PRACTITIONER
Payer: MEDICAID

## 2018-05-03 DIAGNOSIS — Z13.29 SCREENING FOR THYROID DISORDER: ICD-10-CM

## 2018-05-03 DIAGNOSIS — Z13.21 ENCOUNTER FOR VITAMIN DEFICIENCY SCREENING: ICD-10-CM

## 2018-05-03 DIAGNOSIS — Z79.4 TYPE 2 DIABETES MELLITUS WITHOUT COMPLICATION, WITH LONG-TERM CURRENT USE OF INSULIN (HCC): ICD-10-CM

## 2018-05-03 DIAGNOSIS — I10 ESSENTIAL HYPERTENSION: ICD-10-CM

## 2018-05-03 DIAGNOSIS — E11.9 TYPE 2 DIABETES MELLITUS WITHOUT COMPLICATION, WITH LONG-TERM CURRENT USE OF INSULIN (HCC): ICD-10-CM

## 2018-05-03 LAB
25(OH)D3 SERPL-MCNC: 26 NG/ML (ref 30–100)
ALBUMIN SERPL BCP-MCNC: 4.2 G/DL (ref 3.2–4.9)
ALBUMIN/GLOB SERPL: 1.3 G/DL
ALP SERPL-CCNC: 94 U/L (ref 30–99)
ALT SERPL-CCNC: 31 U/L (ref 2–50)
ANION GAP SERPL CALC-SCNC: 8 MMOL/L (ref 0–11.9)
AST SERPL-CCNC: 21 U/L (ref 12–45)
BILIRUB SERPL-MCNC: 0.5 MG/DL (ref 0.1–1.5)
BUN SERPL-MCNC: 16 MG/DL (ref 8–22)
CALCIUM SERPL-MCNC: 10 MG/DL (ref 8.5–10.5)
CHLORIDE SERPL-SCNC: 106 MMOL/L (ref 96–112)
CHOLEST SERPL-MCNC: 174 MG/DL (ref 100–199)
CO2 SERPL-SCNC: 25 MMOL/L (ref 20–33)
CREAT SERPL-MCNC: 0.92 MG/DL (ref 0.5–1.4)
CREAT UR-MCNC: 191.7 MG/DL
EST. AVERAGE GLUCOSE BLD GHB EST-MCNC: 272 MG/DL
GLOBULIN SER CALC-MCNC: 3.2 G/DL (ref 1.9–3.5)
GLUCOSE SERPL-MCNC: 122 MG/DL (ref 65–99)
HBA1C MFR BLD: 11.1 % (ref 0–5.6)
HDLC SERPL-MCNC: 42 MG/DL
LDLC SERPL CALC-MCNC: 97 MG/DL
MICROALBUMIN UR-MCNC: 2.8 MG/DL
MICROALBUMIN/CREAT UR: 15 MG/G (ref 0–30)
POTASSIUM SERPL-SCNC: 4.2 MMOL/L (ref 3.6–5.5)
PROT SERPL-MCNC: 7.4 G/DL (ref 6–8.2)
SODIUM SERPL-SCNC: 139 MMOL/L (ref 135–145)
TRIGL SERPL-MCNC: 177 MG/DL (ref 0–149)
TSH SERPL DL<=0.005 MIU/L-ACNC: 1.22 UIU/ML (ref 0.38–5.33)
VIT B12 SERPL-MCNC: 763 PG/ML (ref 211–911)

## 2018-05-03 PROCEDURE — 80061 LIPID PANEL: CPT

## 2018-05-03 PROCEDURE — 36415 COLL VENOUS BLD VENIPUNCTURE: CPT

## 2018-05-03 PROCEDURE — 82306 VITAMIN D 25 HYDROXY: CPT

## 2018-05-03 PROCEDURE — 83036 HEMOGLOBIN GLYCOSYLATED A1C: CPT

## 2018-05-03 PROCEDURE — 82570 ASSAY OF URINE CREATININE: CPT

## 2018-05-03 PROCEDURE — 82607 VITAMIN B-12: CPT

## 2018-05-03 PROCEDURE — 84443 ASSAY THYROID STIM HORMONE: CPT

## 2018-05-03 PROCEDURE — 80053 COMPREHEN METABOLIC PANEL: CPT

## 2018-05-03 PROCEDURE — 82043 UR ALBUMIN QUANTITATIVE: CPT

## 2018-05-14 ENCOUNTER — OFFICE VISIT (OUTPATIENT)
Dept: MEDICAL GROUP | Facility: MEDICAL CENTER | Age: 63
End: 2018-05-14
Attending: NURSE PRACTITIONER
Payer: MEDICAID

## 2018-05-14 VITALS
SYSTOLIC BLOOD PRESSURE: 130 MMHG | HEIGHT: 70 IN | WEIGHT: 210 LBS | HEART RATE: 100 BPM | TEMPERATURE: 98.1 F | RESPIRATION RATE: 14 BRPM | OXYGEN SATURATION: 97 % | BODY MASS INDEX: 30.06 KG/M2 | DIASTOLIC BLOOD PRESSURE: 80 MMHG

## 2018-05-14 DIAGNOSIS — Z79.4 TYPE 2 DIABETES MELLITUS WITHOUT COMPLICATION, WITH LONG-TERM CURRENT USE OF INSULIN (HCC): ICD-10-CM

## 2018-05-14 DIAGNOSIS — H61.22 IMPACTED CERUMEN OF LEFT EAR: ICD-10-CM

## 2018-05-14 DIAGNOSIS — H93.12 TINNITUS OF LEFT EAR: ICD-10-CM

## 2018-05-14 DIAGNOSIS — E55.9 VITAMIN D DEFICIENCY: ICD-10-CM

## 2018-05-14 DIAGNOSIS — E11.9 TYPE 2 DIABETES MELLITUS WITHOUT COMPLICATION, WITH LONG-TERM CURRENT USE OF INSULIN (HCC): ICD-10-CM

## 2018-05-14 DIAGNOSIS — I10 ESSENTIAL HYPERTENSION: ICD-10-CM

## 2018-05-14 DIAGNOSIS — E78.2 MIXED HYPERLIPIDEMIA: ICD-10-CM

## 2018-05-14 PROCEDURE — 99213 OFFICE O/P EST LOW 20 MIN: CPT | Performed by: NURSE PRACTITIONER

## 2018-05-14 PROCEDURE — 99214 OFFICE O/P EST MOD 30 MIN: CPT | Performed by: NURSE PRACTITIONER

## 2018-05-14 NOTE — PROGRESS NOTES
Chief Complaint:   Chief Complaint   Patient presents with   • Follow-Up       HPI:  Louis is here today for a follow-up on Lab REsults an dDM-2, HTN.      His PMH includes:  DM-2, poor control.  Hyperlipidemia  HTN  Elevated PSA but Pt not amenable to further eval or referral to urology  Cerumen Impactions  Ringing in Ears  Varicose Vein of RLE  Vitamin D Deficiency     Review of Records show  5/3/18 Labs CMP normal except Fasting BS= 122, A1C= 11.1 ( ~ 272). Cholesterol Total= 174, Triglycerides= 177, HDL= 42, LDL= 97,  Vit B12= 763, TSH= 1.220, Vit D= 26 (low)  4/2/18 [facility] for f/u on DM-2, Htn, Med refills. Labs ordered, Refer for Retina exam.RX Claritin for allergies  4/18/17 Clinic for F/u in DM, Glucometer problems. (4/5/17 A1c= 12.2) Pt reports continuing Metformin 850 TID, Lantus 30 u at hs  Discussed Elevated PSA but Pt not willing for Urology Referral. INcrease in dose of Lipitor for ongoing Hyperlipidemia. RX New Meter  True Metrix w supplies. RX Vit D.     12/14/16 Clinic Visit for DM check, Increased Lantus to 30 units at hs.  9/12/16 clinic Visit for Results Review, Pt to continue Metformin  850 mgTID and increase Lantus at hs to 25 units and Increase lantus 2 units every 2 days if BS maintaining >200. Stop titration if having episodes of hypoglycemia.  5/23/16 Last Clinic Visit to re-establish as Pt had been in senior living x 6 months and missed his f/u appt's until May, 2016.--> labs ordered     [place]:  No Entries      Type 2 diabetes mellitus without complication (CMS-HCC)  We reviewed his labs including his high A1C and He reports he continues   To kristi Metformin 850 TID and Basaglar long acting Insulin at hs.  Reports 30 units at hs.  Last checked his BS in am he gets E3 error code.  States has 3 meters but also gets Error codes.    I recommended he bring in his meter at next visit so we can check it and see if he is doing it correctly.    WE discussed need for him to drastically reduce his  sugar/carb intake   And get control over his Blood Sugars as his high levels are deleterious to his health    Hypertension  /80  in clinic today. Known hx of HTN. . Pt stating that they have been taking their medication as prescribed without adverse effect. Pt denies HA, vision changes, neurologic deficits, CP, SOB.     Vitamin D deficiency  Reviewed Vit D low result again.  Is taking 2000 u/day.  Will increase dose.    Hyperlipidemia  We reivewed his Lipid levels which show Improvement  Is taking Atorvastatin daily.    Ringing in the ears  Pt reports ringing in left ear and has excess wax.  Has Debrox drops but not using/  Recommend he start using the drops and return in 2 weeks for left ear irrigation.      Patient Active Problem List    Diagnosis Date Noted   • Vitamin D deficiency 04/18/2017   • Vertigo 05/23/2016   • Cold 09/17/2015   • Ringing in the ears 06/09/2015   • Hyperlipidemia 04/09/2015   • Elevated PSA 04/09/2015   • Type 2 diabetes mellitus without complication (CMS-[facility]) 11/06/2014   • Hypertension 11/06/2014   • Varicose veins of right lower extremity with pain 11/06/2014   • Excessive cerumen in left ear canal 11/06/2014       Allergies:Patient has no known allergies.    Medicines as of today:  Current Outpatient Prescriptions   Medication Sig Dispense Refill   • Cholecalciferol 4000 units Cap Take 1 Capsule by mouth every day. 30 Cap 5   • insulin glargine (LANTUS) 100 UNIT/ML Solution Pen-injector injection Inject 30 Units as instructed every evening. 15 mL 5   • carbamide peroxide (CARBAMOXIDE EAR DROPS) 6.5 % Solution Place 6 Drops in ear 2 times a day. Administer drops in left ear. 1 Bottle 0   • lisinopril (PRINIVIL, ZESTRIL) 40 MG tablet Take 1 Tab by mouth every day. 30 Tab 5   • metFORMIN (GLUCOPHAGE) 850 MG Tab Take 1 Tab by mouth 3 times a day, with meals. 90 Tab 5   • atorvastatin (LIPITOR) 20 MG Tab Take 1 Tab by mouth every day. 30 Tab 5   • Insulin Pen Needle (B-D UF III MINI  "PEN NEEDLES) 31G X 5 MM Misc Use w lantus Insulin daily 100 Each 2   • TECHLITE LANCETS Misc 1 Each by In Vitro route every day. 50 Each 5   • glucose blood (TRUE METRIX BLOOD GLUCOSE TEST) strip 1 Strip by Other route as needed. 50 Strip 5   • loratadine (CLARITIN) 10 MG Tab Take 1 Tab by mouth every day. 30 Tab 2   • Blood Glucose Monitoring Suppl (TRUE METRIX AIR GLUCOSE METER) Device 1 Each by Does not apply route every day. 1 Device 0   • ULTICARE INSULIN SYRINGE 29G X 1/2\" 0.5 ML Misc Pt to use with Lantus Insulin, one injection every night 30 Each 2   • glucose blood (TRUETEST TEST) strip 1 Strip by Other route every day. 30 Strip 3     No current facility-administered medications for this visit.        Social History   Substance Use Topics   • Smoking status: Never Smoker   • Smokeless tobacco: Never Used   • Alcohol use Yes      Comment: once in a while. A couple beers per week.       Past Medical History:   Diagnosis Date   • Hyperlipidemia    • Hypertension    • Type II or unspecified type diabetes mellitus without mention of complication, not stated as uncontrolled        Family History   Problem Relation Age of Onset   • Stroke Mother    • Heart Disease Mother    • Diabetes Mother    • Lung Disease Father        ROS:  Review of Systems   See HPI Above    Exam:  Blood pressure 130/80, pulse 100, temperature 36.7 °C (98.1 °F), resp. rate 14, height 1.778 m (5' 10\"), weight 95.3 kg (210 lb), SpO2 97 %. Body mass index is 30.13 kg/m².    General:  Well nourished, well developed male in NAD  HENT:Head is grossly normal. PERRL. Left ear canal blocked by was, Right ear canal and TM normal  Neck: Supple. Trachea is midline.  Pulmonary: Clear to ausculation .  Normal effort. No rales, ronchi, or wheezing.   Cardiovascular: Regular rate and rhythm.  Abdomen-Abdomen is soft, No tenderness.  Upper extremities-Good ROM  Lower extremities- neg for edema, redness, tenderness.  Neuro- A & O x 4. Speech clear and " appropriate.    Current medications, allergies, and problem list reviewed with patient and updated in EPIC today.    Assessment/Plan:  1. Type 2 diabetes mellitus without complication, with long-term current use of insulin (HCC)  insulin glargine (LANTUS) 100 UNIT/ML Solution Pen-injector injection 30 u at hs  To bring in Glucometer at next visit for check and training.   Continue Metformin   2. Essential hypertension  Continue current meds   3. Vitamin D deficiency  Cholecalciferol 4000 units Cap-increase in dose   4. Mixed hyperlipidemia  Continue Atorvastatin   5. Tinnitus of left ear  carbamide peroxide (CARBAMOXIDE EAR DROPS) 6.5 % Solution   6. Impacted cerumen of left ear  carbamide peroxide (CARBAMOXIDE EAR DROPS) 6.5 % Solution  REturn in 2 weeks for left ear irrigation by Med asst at next appt (long appt)       Return in about 2 weeks (around 5/28/2018) for glucometer check/training. left ear irrigation.

## 2018-05-14 NOTE — ASSESSMENT & PLAN NOTE
We reviewed his labs including his high A1C and He reports he continues   To kristi Metformin 850 TID and Basaglar long acting Insulin at hs.  Reports 30 units at hs.  Last checked his BS in am he gets E3 error code.  States has 3 meters but also gets Error codes.    I recommended he bring in his meter at next visit so we can check it and see if he is doing it correctly.    WE discussed need for him to drastically reduce his sugar/carb intake   And get control over his Blood Sugars as his high levels are deleterious to his health

## 2018-05-14 NOTE — ASSESSMENT & PLAN NOTE
Pt reports ringing in left ear and has excess wax.  Has Debrox drops but not using/  Recommend he start using the drops and return in 2 weeks for left ear irrigation.

## 2018-05-14 NOTE — ASSESSMENT & PLAN NOTE
/80  in clinic today. Known hx of HTN. . Pt stating that they have been taking their medication as prescribed without adverse effect. Pt denies HA, vision changes, neurologic deficits, CP, SOB.

## 2018-05-29 ENCOUNTER — OFFICE VISIT (OUTPATIENT)
Dept: MEDICAL GROUP | Facility: MEDICAL CENTER | Age: 63
End: 2018-05-29
Attending: NURSE PRACTITIONER
Payer: MEDICAID

## 2018-05-29 VITALS
DIASTOLIC BLOOD PRESSURE: 90 MMHG | RESPIRATION RATE: 16 BRPM | HEART RATE: 96 BPM | OXYGEN SATURATION: 100 % | HEIGHT: 71 IN | WEIGHT: 201 LBS | TEMPERATURE: 97.7 F | BODY MASS INDEX: 28.14 KG/M2 | SYSTOLIC BLOOD PRESSURE: 130 MMHG

## 2018-05-29 DIAGNOSIS — Z79.4 TYPE 2 DIABETES MELLITUS WITHOUT COMPLICATION, WITH LONG-TERM CURRENT USE OF INSULIN (HCC): ICD-10-CM

## 2018-05-29 DIAGNOSIS — H93.13 TINNITUS OF BOTH EARS: ICD-10-CM

## 2018-05-29 DIAGNOSIS — H61.22 EXCESSIVE CERUMEN IN LEFT EAR CANAL: ICD-10-CM

## 2018-05-29 DIAGNOSIS — E11.9 TYPE 2 DIABETES MELLITUS WITHOUT COMPLICATION, WITH LONG-TERM CURRENT USE OF INSULIN (HCC): ICD-10-CM

## 2018-05-29 DIAGNOSIS — Z12.11 SCREEN FOR COLON CANCER: ICD-10-CM

## 2018-05-29 PROCEDURE — 99213 OFFICE O/P EST LOW 20 MIN: CPT | Performed by: NURSE PRACTITIONER

## 2018-05-29 NOTE — ASSESSMENT & PLAN NOTE
Despite patient doing on earwax removal and Shad doing ear irrigation today patient reports he still has ringing in both ears.  Often it is worse in the morning.  Denies taking aspirin.  Interested in referral to ENT to look into this chronic ringing.

## 2018-05-29 NOTE — PROGRESS NOTES
"Chief Complaint:   Chief Complaint   Patient presents with   • Cerumen Impaction   • Diabetes     glucometer check        HPI:  Louis is here today for a follow-up on DM-2 and use of his meter, Ear Irrigation    His PMH includes:  DM-2, poor control.  Hyperlipidemia  HTN  Elevated PSA but Pt not amenable to further eval or referral to urology  Cerumen Impactions  Ringing in Ears  Varicose Vein of RLE  Vitamin D Deficiency  Cerumen Impaction left ear canal     Review of Records show  5/14/18 Clinic for Results, f/u DM-2, HTN, ear Wax plugging left ear canal. Discussed need to come in for Glucometer training and for Left Ear Irrigation.  5/3/18 Labs CMP normal except Fasting BS= 122, A1C= 11.1 ( ~ 272). Cholesterol Total= 174, Triglycerides= 177, HDL= 42, LDL= 97,  Vit B12= 763, TSH= 1.220, Vit D= 26 (low)  4/2/18 [facility] for f/u on DM-2, Htn, Med refills. Labs ordered, Refer for Retina exam.RX Claritin for allergies  4/18/17 Clinic for F/u in DM, Glucometer problems. (4/5/17 A1c= 12.2) Pt reports continuing Metformin 850 TID, Lantus 30 u at hs  Discussed Elevated PSA but Pt not willing for Urology Referral. INcrease in dose of Lipitor for ongoing Hyperlipidemia. RX New Meter  True Metrix w supplies. RX Vit D.    Excessive cerumen in left ear canal  Pt did use wax softening drops and irrigated it out at home w little w syringe w warm water and states \"got big chunk out\".   Ирина medical assistant also irrigated his left ear canal to and removed the remaining earwax.  Patient states it feels good.  Continues to have ringing in both ears.  Denies any problems hearing.    Type 2 diabetes mellitus without complication (CMS-HCC)  Pt here as he has had 3 different glucometers in his possession but unable to work any of them.  Medical assistant worked with Meme LE and showed him how to use the True Metrix Meter.  His blood sugar today equal to 56.  Patient to continue his medications and checking his blood sugar in the " mornings fasting.  Patient needs refill of his metformin and his insulin.    Ringing in the ears  Despite patient doing on earwax removal and Shad doing ear irrigation today patient reports he still has ringing in both ears.  Often it is worse in the morning.  Denies taking aspirin.  Interested in referral to ENT to look into this chronic ringing.      Patient Active Problem List    Diagnosis Date Noted   • Vitamin D deficiency 04/18/2017   • Vertigo 05/23/2016   • Cold 09/17/2015   • Ringing in the ears 06/09/2015   • Hyperlipidemia 04/09/2015   • Elevated PSA 04/09/2015   • Type 2 diabetes mellitus without complication (CMS-[facility]) 11/06/2014   • Hypertension 11/06/2014   • Varicose veins of right lower extremity with pain 11/06/2014   • Excessive cerumen in left ear canal 11/06/2014       Allergies:Patient has no known allergies.    Medicines as of today:  Current Outpatient Prescriptions   Medication Sig Dispense Refill   • Cholecalciferol 4000 units Cap Take 1 Capsule by mouth every day. 30 Cap 5   • insulin glargine (LANTUS) 100 UNIT/ML Solution Pen-injector injection Inject 30 Units as instructed every evening. 15 mL 5   • carbamide peroxide (CARBAMOXIDE EAR DROPS) 6.5 % Solution Place 6 Drops in ear 2 times a day. Administer drops in left ear. 1 Bottle 0   • lisinopril (PRINIVIL, ZESTRIL) 40 MG tablet Take 1 Tab by mouth every day. 30 Tab 5   • metFORMIN (GLUCOPHAGE) 850 MG Tab Take 1 Tab by mouth 3 times a day, with meals. 90 Tab 5   • atorvastatin (LIPITOR) 20 MG Tab Take 1 Tab by mouth every day. 30 Tab 5   • Insulin Pen Needle (B-D UF III MINI PEN NEEDLES) 31G X 5 MM Misc Use w lantus Insulin daily 100 Each 2   • TECHLITE LANCETS Misc 1 Each by In Vitro route every day. 50 Each 5   • glucose blood (TRUE METRIX BLOOD GLUCOSE TEST) strip 1 Strip by Other route as needed. 50 Strip 5   • loratadine (CLARITIN) 10 MG Tab Take 1 Tab by mouth every day. 30 Tab 2   • Blood Glucose Monitoring Suppl (TRUE METRIX AIR  "GLUCOSE METER) Device 1 Each by Does not apply route every day. 1 Device 0   • ULTICARE INSULIN SYRINGE 29G X 1/2\" 0.5 ML Misc Pt to use with Lantus Insulin, one injection every night 30 Each 2   • glucose blood (TRUETEST TEST) strip 1 Strip by Other route every day. 30 Strip 3     No current facility-administered medications for this visit.        Social History   Substance Use Topics   • Smoking status: Never Smoker   • Smokeless tobacco: Never Used   • Alcohol use Yes      Comment: once in a while. A couple beers per week.       Past Medical History:   Diagnosis Date   • Hyperlipidemia    • Hypertension    • Type II or unspecified type diabetes mellitus without mention of complication, not stated as uncontrolled        Family History   Problem Relation Age of Onset   • Stroke Mother    • Heart Disease Mother    • Diabetes Mother    • Lung Disease Father        ROS:  Review of Systems   See HPI Above    Exam:  Blood pressure 130/90, pulse 96, temperature 36.5 °C (97.7 °F), resp. rate 16, height 1.803 m (5' 11\"), weight 91.2 kg (201 lb), SpO2 100 %. Body mass index is 28.03 kg/m².    General:  Well nourished, well developed male in NAD  HENT:Head is grossly normal. PERRL. Ear canals both clear( after left ear irrigation by MA today)  Neck: Supple. Trachea is midline.  Pulmonary: speaks in Full Sentences easily.  Normal effort.  Cardiovascular: Regular rate and rhythm.  Abdomen-Abdomen is soft, No tenderness.  Upper extremities-  Good ROM  Lower extremities- neg for edema, redness, tenderness.  Neuro- A & O x 4. Speech clear and appropriate.    Current medications, allergies, and problem list reviewed with patient and updated in [facility] today.    Assessment/Plan:  1. Excessive cerumen in left ear canal  Left ear irrigation by Ирина perez.  Pt to avoid use of Q tips.   2. Tinnitus of both ears  REFERRAL TO ENT   3. Screen for colon cancer  OCCULT BLOOD FECES IMMUNOASSAY (FIT)   4. Type 2 diabetes mellitus without " complication, with long-term current use of insulin (HCC)  Pt to check Fasting BS every am and adjust Long Acting Insulin as discussed, currently at 30 units. Continue Metformin. Pt shown how to use his True Metrix Meter today by Ирина BRADLEY.       Return in about 3 months (around 8/29/2018).

## 2018-05-29 NOTE — ASSESSMENT & PLAN NOTE
Pt here as he has had 3 different glucometers in his possession but unable to work any of them.  Medical assistant worked with Ejmarquise LE and showed him how to use the True Metrix Meter.  His blood sugar today equal to 56.  Patient to continue his medications and checking his blood sugar in the mornings fasting.  Patient needs refill of his metformin and his insulin.

## 2018-05-29 NOTE — ASSESSMENT & PLAN NOTE
"Pt did use wax softening drops and irrigated it out at home w little w syringe w warm water and states \"got big chunk out\".   Ирина medical assistant also irrigated his left ear canal to and removed the remaining earwax.  Patient states it feels good.  Continues to have ringing in both ears.  Denies any problems hearing.  "

## 2018-06-01 ENCOUNTER — HOSPITAL ENCOUNTER (OUTPATIENT)
Facility: MEDICAL CENTER | Age: 63
End: 2018-06-01
Attending: NURSE PRACTITIONER
Payer: MEDICAID

## 2018-06-01 PROCEDURE — 82274 ASSAY TEST FOR BLOOD FECAL: CPT

## 2018-06-04 DIAGNOSIS — Z12.11 SCREEN FOR COLON CANCER: ICD-10-CM

## 2018-06-04 LAB — HEMOCCULT STL QL IA: NEGATIVE

## 2018-08-09 ENCOUNTER — HOSPITAL ENCOUNTER (INPATIENT)
Facility: MEDICAL CENTER | Age: 63
LOS: 15 days | DRG: 464 | End: 2018-08-24
Attending: EMERGENCY MEDICINE | Admitting: HOSPITALIST
Payer: MEDICAID

## 2018-08-09 ENCOUNTER — APPOINTMENT (OUTPATIENT)
Dept: RADIOLOGY | Facility: MEDICAL CENTER | Age: 63
DRG: 464 | End: 2018-08-09
Attending: EMERGENCY MEDICINE
Payer: MEDICAID

## 2018-08-09 ENCOUNTER — OFFICE VISIT (OUTPATIENT)
Dept: MEDICAL GROUP | Facility: MEDICAL CENTER | Age: 63
End: 2018-08-09
Attending: FAMILY MEDICINE
Payer: MEDICAID

## 2018-08-09 VITALS
SYSTOLIC BLOOD PRESSURE: 124 MMHG | HEART RATE: 108 BPM | WEIGHT: 199 LBS | OXYGEN SATURATION: 94 % | DIASTOLIC BLOOD PRESSURE: 76 MMHG | TEMPERATURE: 97.4 F | RESPIRATION RATE: 16 BRPM | BODY MASS INDEX: 27.75 KG/M2

## 2018-08-09 DIAGNOSIS — E11.65 UNCONTROLLED TYPE 2 DIABETES MELLITUS WITH HYPERGLYCEMIA, WITH LONG-TERM CURRENT USE OF INSULIN (HCC): ICD-10-CM

## 2018-08-09 DIAGNOSIS — L97.509 TYPE 2 DIABETES MELLITUS WITH FOOT ULCER, WITH LONG-TERM CURRENT USE OF INSULIN (HCC): ICD-10-CM

## 2018-08-09 DIAGNOSIS — E11.628 DIABETIC INFECTION OF RIGHT FOOT (HCC): ICD-10-CM

## 2018-08-09 DIAGNOSIS — I99.8 ISCHEMIA OF TOE: ICD-10-CM

## 2018-08-09 DIAGNOSIS — Z79.4 UNCONTROLLED TYPE 2 DIABETES MELLITUS WITH HYPERGLYCEMIA, WITH LONG-TERM CURRENT USE OF INSULIN (HCC): ICD-10-CM

## 2018-08-09 DIAGNOSIS — Z79.4 TYPE 2 DIABETES MELLITUS WITH FOOT ULCER, WITH LONG-TERM CURRENT USE OF INSULIN (HCC): ICD-10-CM

## 2018-08-09 DIAGNOSIS — L97.511 SKIN ULCER OF RIGHT FOOT, LIMITED TO BREAKDOWN OF SKIN (HCC): ICD-10-CM

## 2018-08-09 DIAGNOSIS — L08.9 DIABETIC INFECTION OF RIGHT FOOT (HCC): ICD-10-CM

## 2018-08-09 DIAGNOSIS — E11.621 TYPE 2 DIABETES MELLITUS WITH FOOT ULCER, WITH LONG-TERM CURRENT USE OF INSULIN (HCC): ICD-10-CM

## 2018-08-09 PROBLEM — E87.1 HYPONATREMIA: Status: ACTIVE | Noted: 2018-08-09

## 2018-08-09 LAB
ANION GAP SERPL CALC-SCNC: 12 MMOL/L (ref 0–11.9)
BASOPHILS # BLD AUTO: 0.6 % (ref 0–1.8)
BASOPHILS # BLD: 0.11 K/UL (ref 0–0.12)
BUN SERPL-MCNC: 22 MG/DL (ref 8–22)
CALCIUM SERPL-MCNC: 9.8 MG/DL (ref 8.5–10.5)
CHLORIDE SERPL-SCNC: 97 MMOL/L (ref 96–112)
CO2 SERPL-SCNC: 23 MMOL/L (ref 20–33)
CREAT SERPL-MCNC: 1.31 MG/DL (ref 0.5–1.4)
EOSINOPHIL # BLD AUTO: 0.09 K/UL (ref 0–0.51)
EOSINOPHIL NFR BLD: 0.5 % (ref 0–6.9)
ERYTHROCYTE [DISTWIDTH] IN BLOOD BY AUTOMATED COUNT: 40.1 FL (ref 35.9–50)
GLUCOSE SERPL-MCNC: 420 MG/DL (ref 65–99)
HCT VFR BLD AUTO: 38.8 % (ref 42–52)
HGB BLD-MCNC: 13.2 G/DL (ref 14–18)
IMM GRANULOCYTES # BLD AUTO: 0.16 K/UL (ref 0–0.11)
IMM GRANULOCYTES NFR BLD AUTO: 0.9 % (ref 0–0.9)
LYMPHOCYTES # BLD AUTO: 1.12 K/UL (ref 1–4.8)
LYMPHOCYTES NFR BLD: 6.4 % (ref 22–41)
MCH RBC QN AUTO: 28 PG (ref 27–33)
MCHC RBC AUTO-ENTMCNC: 34 G/DL (ref 33.7–35.3)
MCV RBC AUTO: 82.4 FL (ref 81.4–97.8)
MONOCYTES # BLD AUTO: 1.24 K/UL (ref 0–0.85)
MONOCYTES NFR BLD AUTO: 7.1 % (ref 0–13.4)
NEUTROPHILS # BLD AUTO: 14.8 K/UL (ref 1.82–7.42)
NEUTROPHILS NFR BLD: 84.5 % (ref 44–72)
NRBC # BLD AUTO: 0 K/UL
NRBC BLD-RTO: 0 /100 WBC
PLATELET # BLD AUTO: 341 K/UL (ref 164–446)
PMV BLD AUTO: 9.7 FL (ref 9–12.9)
POTASSIUM SERPL-SCNC: 4.2 MMOL/L (ref 3.6–5.5)
RBC # BLD AUTO: 4.71 M/UL (ref 4.7–6.1)
SODIUM SERPL-SCNC: 132 MMOL/L (ref 135–145)
WBC # BLD AUTO: 17.5 K/UL (ref 4.8–10.8)

## 2018-08-09 PROCEDURE — 99285 EMERGENCY DEPT VISIT HI MDM: CPT

## 2018-08-09 PROCEDURE — 99213 OFFICE O/P EST LOW 20 MIN: CPT | Performed by: FAMILY MEDICINE

## 2018-08-09 PROCEDURE — 99215 OFFICE O/P EST HI 40 MIN: CPT | Performed by: FAMILY MEDICINE

## 2018-08-09 PROCEDURE — 96367 TX/PROPH/DG ADDL SEQ IV INF: CPT

## 2018-08-09 PROCEDURE — 700111 HCHG RX REV CODE 636 W/ 250 OVERRIDE (IP): Performed by: EMERGENCY MEDICINE

## 2018-08-09 PROCEDURE — 700105 HCHG RX REV CODE 258: Performed by: EMERGENCY MEDICINE

## 2018-08-09 PROCEDURE — 36415 COLL VENOUS BLD VENIPUNCTURE: CPT

## 2018-08-09 PROCEDURE — 99223 1ST HOSP IP/OBS HIGH 75: CPT | Mod: 25 | Performed by: HOSPITALIST

## 2018-08-09 PROCEDURE — 80048 BASIC METABOLIC PNL TOTAL CA: CPT

## 2018-08-09 PROCEDURE — 96365 THER/PROPH/DIAG IV INF INIT: CPT

## 2018-08-09 PROCEDURE — 73630 X-RAY EXAM OF FOOT: CPT | Mod: RT

## 2018-08-09 PROCEDURE — 82962 GLUCOSE BLOOD TEST: CPT

## 2018-08-09 PROCEDURE — 96366 THER/PROPH/DIAG IV INF ADDON: CPT

## 2018-08-09 PROCEDURE — 87077 CULTURE AEROBIC IDENTIFY: CPT

## 2018-08-09 PROCEDURE — 85025 COMPLETE CBC W/AUTO DIFF WBC: CPT

## 2018-08-09 PROCEDURE — 87070 CULTURE OTHR SPECIMN AEROBIC: CPT

## 2018-08-09 PROCEDURE — 770006 HCHG ROOM/CARE - MED/SURG/GYN SEMI*

## 2018-08-09 RX ORDER — ONDANSETRON 4 MG/1
4 TABLET, ORALLY DISINTEGRATING ORAL EVERY 4 HOURS PRN
Status: DISCONTINUED | OUTPATIENT
Start: 2018-08-09 | End: 2018-08-24 | Stop reason: HOSPADM

## 2018-08-09 RX ORDER — SODIUM CHLORIDE 9 MG/ML
2000 INJECTION, SOLUTION INTRAVENOUS ONCE
Status: ACTIVE | OUTPATIENT
Start: 2018-08-09 | End: 2018-08-10

## 2018-08-09 RX ORDER — ONDANSETRON 2 MG/ML
4 INJECTION INTRAMUSCULAR; INTRAVENOUS EVERY 4 HOURS PRN
Status: DISCONTINUED | OUTPATIENT
Start: 2018-08-09 | End: 2018-08-24 | Stop reason: HOSPADM

## 2018-08-09 RX ORDER — POLYETHYLENE GLYCOL 3350 17 G/17G
1 POWDER, FOR SOLUTION ORAL
Status: DISCONTINUED | OUTPATIENT
Start: 2018-08-09 | End: 2018-08-24 | Stop reason: HOSPADM

## 2018-08-09 RX ORDER — AMOXICILLIN 250 MG
2 CAPSULE ORAL 2 TIMES DAILY
Status: DISCONTINUED | OUTPATIENT
Start: 2018-08-09 | End: 2018-08-24 | Stop reason: HOSPADM

## 2018-08-09 RX ORDER — PROMETHAZINE HYDROCHLORIDE 12.5 MG/1
12.5-25 SUPPOSITORY RECTAL EVERY 4 HOURS PRN
Status: DISCONTINUED | OUTPATIENT
Start: 2018-08-09 | End: 2018-08-24 | Stop reason: HOSPADM

## 2018-08-09 RX ORDER — HEPARIN SODIUM 5000 [USP'U]/ML
5000 INJECTION, SOLUTION INTRAVENOUS; SUBCUTANEOUS EVERY 8 HOURS
Status: DISCONTINUED | OUTPATIENT
Start: 2018-08-09 | End: 2018-08-14

## 2018-08-09 RX ORDER — LABETALOL HYDROCHLORIDE 5 MG/ML
10 INJECTION, SOLUTION INTRAVENOUS EVERY 4 HOURS PRN
Status: DISCONTINUED | OUTPATIENT
Start: 2018-08-09 | End: 2018-08-24 | Stop reason: HOSPADM

## 2018-08-09 RX ORDER — PROMETHAZINE HYDROCHLORIDE 25 MG/1
12.5-25 TABLET ORAL EVERY 4 HOURS PRN
Status: DISCONTINUED | OUTPATIENT
Start: 2018-08-09 | End: 2018-08-24 | Stop reason: HOSPADM

## 2018-08-09 RX ORDER — SODIUM CHLORIDE 9 MG/ML
500 INJECTION, SOLUTION INTRAVENOUS
Status: COMPLETED | OUTPATIENT
Start: 2018-08-09 | End: 2018-08-23

## 2018-08-09 RX ORDER — LISINOPRIL 20 MG/1
40 TABLET ORAL DAILY
Status: DISCONTINUED | OUTPATIENT
Start: 2018-08-10 | End: 2018-08-24 | Stop reason: HOSPADM

## 2018-08-09 RX ORDER — ATORVASTATIN CALCIUM 20 MG/1
20 TABLET, FILM COATED ORAL DAILY
Status: DISCONTINUED | OUTPATIENT
Start: 2018-08-10 | End: 2018-08-24 | Stop reason: HOSPADM

## 2018-08-09 RX ORDER — SODIUM CHLORIDE 9 MG/ML
1000 INJECTION, SOLUTION INTRAVENOUS ONCE
Status: COMPLETED | OUTPATIENT
Start: 2018-08-09 | End: 2018-08-09

## 2018-08-09 RX ORDER — ACETAMINOPHEN 325 MG/1
650 TABLET ORAL EVERY 6 HOURS PRN
Status: DISCONTINUED | OUTPATIENT
Start: 2018-08-09 | End: 2018-08-24 | Stop reason: HOSPADM

## 2018-08-09 RX ORDER — INSULIN GLARGINE 100 [IU]/ML
30 INJECTION, SOLUTION SUBCUTANEOUS EVERY EVENING
Status: DISCONTINUED | OUTPATIENT
Start: 2018-08-09 | End: 2018-08-24 | Stop reason: HOSPADM

## 2018-08-09 RX ORDER — BISACODYL 10 MG
10 SUPPOSITORY, RECTAL RECTAL
Status: DISCONTINUED | OUTPATIENT
Start: 2018-08-09 | End: 2018-08-24 | Stop reason: HOSPADM

## 2018-08-09 RX ORDER — DEXTROSE MONOHYDRATE 25 G/50ML
25 INJECTION, SOLUTION INTRAVENOUS
Status: DISCONTINUED | OUTPATIENT
Start: 2018-08-09 | End: 2018-08-22

## 2018-08-09 RX ADMIN — SODIUM CHLORIDE 1000 ML: 9 INJECTION, SOLUTION INTRAVENOUS at 19:09

## 2018-08-09 RX ADMIN — SODIUM CHLORIDE 3 G: 900 INJECTION INTRAVENOUS at 19:09

## 2018-08-09 RX ADMIN — VANCOMYCIN HYDROCHLORIDE 2500 MG: 100 INJECTION, POWDER, LYOPHILIZED, FOR SOLUTION INTRAVENOUS at 20:13

## 2018-08-09 ASSESSMENT — COGNITIVE AND FUNCTIONAL STATUS - GENERAL
WALKING IN HOSPITAL ROOM: A LITTLE
STANDING UP FROM CHAIR USING ARMS: A LITTLE
CLIMB 3 TO 5 STEPS WITH RAILING: A LITTLE
SUGGESTED CMS G CODE MODIFIER DAILY ACTIVITY: CI
DAILY ACTIVITIY SCORE: 23
SUGGESTED CMS G CODE MODIFIER MOBILITY: CK
MOVING TO AND FROM BED TO CHAIR: A LITTLE
DRESSING REGULAR LOWER BODY CLOTHING: A LITTLE
MOVING FROM LYING ON BACK TO SITTING ON SIDE OF FLAT BED: A LITTLE
MOBILITY SCORE: 19

## 2018-08-09 ASSESSMENT — PATIENT HEALTH QUESTIONNAIRE - PHQ9
4. FEELING TIRED OR HAVING LITTLE ENERGY: MORE THAN HALF THE DAYS
9. THOUGHTS THAT YOU WOULD BE BETTER OFF DEAD, OR OF HURTING YOURSELF: NOT AT ALL
8. MOVING OR SPEAKING SO SLOWLY THAT OTHER PEOPLE COULD HAVE NOTICED. OR THE OPPOSITE, BEING SO FIGETY OR RESTLESS THAT YOU HAVE BEEN MOVING AROUND A LOT MORE THAN USUAL: SEVERAL DAYS
7. TROUBLE CONCENTRATING ON THINGS, SUCH AS READING THE NEWSPAPER OR WATCHING TELEVISION: NOT AT ALL
SUM OF ALL RESPONSES TO PHQ QUESTIONS 1-9: 7
6. FEELING BAD ABOUT YOURSELF - OR THAT YOU ARE A FAILURE OR HAVE LET YOURSELF OR YOUR FAMILY DOWN: NOT AL ALL
SUM OF ALL RESPONSES TO PHQ9 QUESTIONS 1 AND 2: 4
2. FEELING DOWN, DEPRESSED, IRRITABLE, OR HOPELESS: MORE THAN HALF THE DAYS
3. TROUBLE FALLING OR STAYING ASLEEP OR SLEEPING TOO MUCH: NOT AT ALL
1. LITTLE INTEREST OR PLEASURE IN DOING THINGS: MORE THAN HALF THE DAYS
5. POOR APPETITE OR OVEREATING: NOT AT ALL

## 2018-08-09 ASSESSMENT — ENCOUNTER SYMPTOMS
MYALGIAS: 0
COUGH: 0
SHORTNESS OF BREATH: 0
DEPRESSION: 0
FEVER: 0
DIARRHEA: 0
CHILLS: 0
VOMITING: 0
ABDOMINAL PAIN: 0
WHEEZING: 0
FOCAL WEAKNESS: 0
DIZZINESS: 0
TINGLING: 0
SORE THROAT: 0
HEADACHES: 0
PHOTOPHOBIA: 0
NAUSEA: 0
PALPITATIONS: 0

## 2018-08-09 ASSESSMENT — LIFESTYLE VARIABLES
DO YOU DRINK ALCOHOL: NO
EVER_SMOKED: NEVER

## 2018-08-09 ASSESSMENT — PAIN SCALES - GENERAL
PAINLEVEL_OUTOF10: 0
PAINLEVEL_OUTOF10: 0

## 2018-08-10 ENCOUNTER — APPOINTMENT (OUTPATIENT)
Dept: RADIOLOGY | Facility: MEDICAL CENTER | Age: 63
DRG: 464 | End: 2018-08-10
Attending: HOSPITALIST
Payer: MEDICAID

## 2018-08-10 LAB
ANION GAP SERPL CALC-SCNC: 10 MMOL/L (ref 0–11.9)
BASOPHILS # BLD AUTO: 0.6 % (ref 0–1.8)
BASOPHILS # BLD: 0.08 K/UL (ref 0–0.12)
BUN SERPL-MCNC: 14 MG/DL (ref 8–22)
CALCIUM SERPL-MCNC: 9 MG/DL (ref 8.5–10.5)
CHLORIDE SERPL-SCNC: 103 MMOL/L (ref 96–112)
CO2 SERPL-SCNC: 22 MMOL/L (ref 20–33)
CREAT SERPL-MCNC: 0.87 MG/DL (ref 0.5–1.4)
CRP SERPL HS-MCNC: 20.11 MG/DL (ref 0–0.75)
EOSINOPHIL # BLD AUTO: 0.25 K/UL (ref 0–0.51)
EOSINOPHIL NFR BLD: 1.9 % (ref 0–6.9)
ERYTHROCYTE [DISTWIDTH] IN BLOOD BY AUTOMATED COUNT: 39 FL (ref 35.9–50)
ERYTHROCYTE [SEDIMENTATION RATE] IN BLOOD BY WESTERGREN METHOD: 93 MM/HOUR (ref 0–20)
EST. AVERAGE GLUCOSE BLD GHB EST-MCNC: 298 MG/DL
GLUCOSE BLD-MCNC: 245 MG/DL (ref 65–99)
GLUCOSE BLD-MCNC: 276 MG/DL (ref 65–99)
GLUCOSE BLD-MCNC: 285 MG/DL (ref 65–99)
GLUCOSE BLD-MCNC: 288 MG/DL (ref 65–99)
GLUCOSE BLD-MCNC: 334 MG/DL (ref 65–99)
GLUCOSE SERPL-MCNC: 275 MG/DL (ref 65–99)
HBA1C MFR BLD: 12 % (ref 0–5.6)
HCT VFR BLD AUTO: 36.1 % (ref 42–52)
HGB BLD-MCNC: 12.4 G/DL (ref 14–18)
IMM GRANULOCYTES # BLD AUTO: 0.09 K/UL (ref 0–0.11)
IMM GRANULOCYTES NFR BLD AUTO: 0.7 % (ref 0–0.9)
LYMPHOCYTES # BLD AUTO: 1.33 K/UL (ref 1–4.8)
LYMPHOCYTES NFR BLD: 9.9 % (ref 22–41)
MCH RBC QN AUTO: 27.9 PG (ref 27–33)
MCHC RBC AUTO-ENTMCNC: 34.3 G/DL (ref 33.7–35.3)
MCV RBC AUTO: 81.1 FL (ref 81.4–97.8)
MONOCYTES # BLD AUTO: 1.05 K/UL (ref 0–0.85)
MONOCYTES NFR BLD AUTO: 7.8 % (ref 0–13.4)
NEUTROPHILS # BLD AUTO: 10.65 K/UL (ref 1.82–7.42)
NEUTROPHILS NFR BLD: 79.1 % (ref 44–72)
NRBC # BLD AUTO: 0 K/UL
NRBC BLD-RTO: 0 /100 WBC
OSMOLALITY SERPL: 291 MOSM/KG H2O (ref 278–298)
OSMOLALITY UR: 853 MOSM/KG H2O (ref 300–900)
PLATELET # BLD AUTO: 297 K/UL (ref 164–446)
PMV BLD AUTO: 9.9 FL (ref 9–12.9)
POTASSIUM SERPL-SCNC: 3.5 MMOL/L (ref 3.6–5.5)
RBC # BLD AUTO: 4.45 M/UL (ref 4.7–6.1)
SODIUM SERPL-SCNC: 135 MMOL/L (ref 135–145)
VANCOMYCIN SERPL-MCNC: 20.5 UG/ML
WBC # BLD AUTO: 13.5 K/UL (ref 4.8–10.8)

## 2018-08-10 PROCEDURE — 82962 GLUCOSE BLOOD TEST: CPT

## 2018-08-10 PROCEDURE — 770006 HCHG ROOM/CARE - MED/SURG/GYN SEMI*

## 2018-08-10 PROCEDURE — 85025 COMPLETE CBC W/AUTO DIFF WBC: CPT

## 2018-08-10 PROCEDURE — A9270 NON-COVERED ITEM OR SERVICE: HCPCS | Performed by: HOSPITALIST

## 2018-08-10 PROCEDURE — 86140 C-REACTIVE PROTEIN: CPT

## 2018-08-10 PROCEDURE — 83930 ASSAY OF BLOOD OSMOLALITY: CPT

## 2018-08-10 PROCEDURE — 85652 RBC SED RATE AUTOMATED: CPT

## 2018-08-10 PROCEDURE — 83935 ASSAY OF URINE OSMOLALITY: CPT

## 2018-08-10 PROCEDURE — 73720 MRI LWR EXTREMITY W/O&W/DYE: CPT | Mod: RT

## 2018-08-10 PROCEDURE — A9270 NON-COVERED ITEM OR SERVICE: HCPCS | Performed by: INTERNAL MEDICINE

## 2018-08-10 PROCEDURE — 700102 HCHG RX REV CODE 250 W/ 637 OVERRIDE(OP): Performed by: INTERNAL MEDICINE

## 2018-08-10 PROCEDURE — 700105 HCHG RX REV CODE 258: Performed by: HOSPITALIST

## 2018-08-10 PROCEDURE — 700102 HCHG RX REV CODE 250 W/ 637 OVERRIDE(OP): Performed by: HOSPITALIST

## 2018-08-10 PROCEDURE — 99233 SBSQ HOSP IP/OBS HIGH 50: CPT | Performed by: HOSPITALIST

## 2018-08-10 PROCEDURE — 700117 HCHG RX CONTRAST REV CODE 255: Performed by: HOSPITALIST

## 2018-08-10 PROCEDURE — 36415 COLL VENOUS BLD VENIPUNCTURE: CPT

## 2018-08-10 PROCEDURE — 99255 IP/OBS CONSLTJ NEW/EST HI 80: CPT | Performed by: INTERNAL MEDICINE

## 2018-08-10 PROCEDURE — 83036 HEMOGLOBIN GLYCOSYLATED A1C: CPT

## 2018-08-10 PROCEDURE — 80202 ASSAY OF VANCOMYCIN: CPT

## 2018-08-10 PROCEDURE — 80048 BASIC METABOLIC PNL TOTAL CA: CPT

## 2018-08-10 PROCEDURE — 700111 HCHG RX REV CODE 636 W/ 250 OVERRIDE (IP): Performed by: HOSPITALIST

## 2018-08-10 PROCEDURE — A9585 GADOBUTROL INJECTION: HCPCS | Performed by: HOSPITALIST

## 2018-08-10 RX ORDER — GADOBUTROL 604.72 MG/ML
10 INJECTION INTRAVENOUS ONCE
Status: COMPLETED | OUTPATIENT
Start: 2018-08-10 | End: 2018-08-10

## 2018-08-10 RX ORDER — LINEZOLID 600 MG/1
600 TABLET, FILM COATED ORAL EVERY 12 HOURS
Status: DISCONTINUED | OUTPATIENT
Start: 2018-08-10 | End: 2018-08-12

## 2018-08-10 RX ADMIN — Medication 2 TABLET: at 17:27

## 2018-08-10 RX ADMIN — GADOBUTROL 10 ML: 604.72 INJECTION INTRAVENOUS at 22:35

## 2018-08-10 RX ADMIN — LINEZOLID 600 MG: 600 TABLET, FILM COATED ORAL at 11:25

## 2018-08-10 RX ADMIN — INSULIN HUMAN 3 UNITS: 100 INJECTION, SOLUTION PARENTERAL at 11:32

## 2018-08-10 RX ADMIN — AMPICILLIN AND SULBACTAM 3 G: 2; 1 INJECTION, POWDER, FOR SOLUTION INTRAVENOUS at 18:49

## 2018-08-10 RX ADMIN — INSULIN HUMAN 5 UNITS: 100 INJECTION, SOLUTION PARENTERAL at 06:19

## 2018-08-10 RX ADMIN — INSULIN GLARGINE 30 UNITS: 100 INJECTION, SOLUTION SUBCUTANEOUS at 17:27

## 2018-08-10 RX ADMIN — INSULIN HUMAN 6 UNITS: 100 INJECTION, SOLUTION PARENTERAL at 21:15

## 2018-08-10 RX ADMIN — LISINOPRIL 40 MG: 20 TABLET ORAL at 06:16

## 2018-08-10 RX ADMIN — AMPICILLIN AND SULBACTAM 3 G: 2; 1 INJECTION, POWDER, FOR SOLUTION INTRAVENOUS at 13:28

## 2018-08-10 RX ADMIN — AMPICILLIN AND SULBACTAM 3 G: 2; 1 INJECTION, POWDER, FOR SOLUTION INTRAVENOUS at 01:04

## 2018-08-10 RX ADMIN — ATORVASTATIN CALCIUM 20 MG: 20 TABLET, FILM COATED ORAL at 06:16

## 2018-08-10 RX ADMIN — Medication 2 TABLET: at 06:15

## 2018-08-10 RX ADMIN — LINEZOLID 600 MG: 600 TABLET, FILM COATED ORAL at 17:27

## 2018-08-10 RX ADMIN — HEPARIN SODIUM 5000 UNITS: 5000 INJECTION, SOLUTION INTRAVENOUS; SUBCUTANEOUS at 06:14

## 2018-08-10 RX ADMIN — HEPARIN SODIUM 5000 UNITS: 5000 INJECTION, SOLUTION INTRAVENOUS; SUBCUTANEOUS at 21:18

## 2018-08-10 RX ADMIN — HEPARIN SODIUM 5000 UNITS: 5000 INJECTION, SOLUTION INTRAVENOUS; SUBCUTANEOUS at 13:28

## 2018-08-10 RX ADMIN — INSULIN HUMAN 5 UNITS: 100 INJECTION, SOLUTION PARENTERAL at 17:28

## 2018-08-10 RX ADMIN — AMPICILLIN AND SULBACTAM 3 G: 2; 1 INJECTION, POWDER, FOR SOLUTION INTRAVENOUS at 06:13

## 2018-08-10 ASSESSMENT — ENCOUNTER SYMPTOMS
HEARTBURN: 0
PALPITATIONS: 0
BLURRED VISION: 0
SPUTUM PRODUCTION: 0
LOSS OF CONSCIOUSNESS: 0
ABDOMINAL PAIN: 0
DOUBLE VISION: 0
CHILLS: 0
HEADACHES: 0
FEVER: 0
SORE THROAT: 0
FALLS: 0
SHORTNESS OF BREATH: 0
BACK PAIN: 0
COUGH: 0
NAUSEA: 0
DIZZINESS: 0

## 2018-08-10 ASSESSMENT — PAIN SCALES - GENERAL
PAINLEVEL_OUTOF10: 0
PAINLEVEL_OUTOF10: 0

## 2018-08-10 NOTE — PROGRESS NOTES
Subjective:      Louis Orourke is a 62 y.o. male who presents with Toe Pain (swollen toe)            HPI 1. Ischemic toe-patient is normally followed by BAYRON Davis in our clinic. He presents to see me today for the first time complaining of bleeding from his right foot along with pain. Patient is very taciturn and a notably poor historian. He reports that he trimmed a callus about 9 months ago on the bottom of his right foot and that is apparently been bleeding intermittently and is bleeding today. He is not certain how long he has had grayish discoloration of his right fourth toe and bleeding and tissue loss from the inferior surface of that toe area  2. Diabetes mellitus-patient reports he is injecting 80 units of Lantus and taking metformin 850 mg, 3 tablets daily. He is not checking sugars. A1c collected in May was notable at 11.1.    ROS denies current fever, any other bleeding beyond his right foot, nausea       Objective:     /76   Pulse (!) 108   Temp 36.3 °C (97.4 °F)   Resp 16   Wt 90.3 kg (199 lb)   SpO2 94%   BMI 27.75 kg/m²      Physical Exam   General-cooperative but subdued male who limps favoring his right foot.  Right lower extremity-notable for moderate swelling and a marked grayish discoloration of the fourth toe. The inferior surface of the fourth toe is missing most of the epidermis at the proximal and middle phalangeal segments. This area is bleeding bright red blood at a slow rate. There is also bleeding occurring from a thickened 1 cm callus on the plantar surface of the right foot over the distal fourth metatarsal region. Patient reports mildly diminished touch diffusely over the left foot. The left foot is generally 1+ swollen with a very stiff ankle          Assessment/Plan:     1. Ischemia of toe-severe ischemia not obviously infected at this time      2. Skin ulcer of right foot, limited to breakdown of skin (HCC)    3. Uncontrolled type 2 diabetes mellitus with  hyperglycemia, with long-term current use of insulin (HCC)    Plan: 1. Patient is transported via [facility] to [facility]  2. Anticipate patient may likely require partial amputation involving the right foot, prolonged wound care, urgent orthopedic evaluation, adjustment of diabetes medications.

## 2018-08-10 NOTE — PROGRESS NOTES
[facility] Medicine Daily Progress Note    Date of Service  8/10/2018    Chief Complaint  62 y.o. male admitted 8/9/2018 with diabetic foot infection.    Interval Problem Update  ID and ortho consulted.   Per ID, vanc changed to zyvox.  MRI pending.  Pending limb preservation eval  No fever/chills  Bloody draining from right foot wound has stopped    Consultants/Specialty  Infectious disease  Orthopedics    Disposition  Inpatient     Review of Systems  Review of Systems   Constitutional: Negative for chills and fever.   HENT: Negative for congestion, hearing loss and sore throat.    Eyes: Negative for blurred vision and double vision.   Respiratory: Negative for cough, sputum production and shortness of breath.    Cardiovascular: Negative for chest pain and palpitations.   Gastrointestinal: Negative for abdominal pain, heartburn and nausea.   Genitourinary: Negative for dysuria and urgency.   Musculoskeletal: Negative for back pain and falls.   Skin: Negative for itching and rash.        Wound on sole of right foot   Neurological: Negative for dizziness, loss of consciousness and headaches.        Physical Exam  Blood Pressure: 141/87   Temperature: 36.8 °C (98.3 °F)   Pulse: 94   Respiration: 18   Pulse Oximetry: 94 %     Physical Exam   Constitutional: He is oriented to person, place, and time. He appears well-developed and well-nourished.   HENT:   Head: Normocephalic and atraumatic.   Eyes: Pupils are equal, round, and reactive to light.   Cardiovascular: Normal rate and regular rhythm.    Good LLE distal pulse. Unable to palpate RLE dorsalis pedis due to edema   Pulmonary/Chest: Breath sounds normal. No respiratory distress.   Abdominal: Soft. Bowel sounds are normal. He exhibits no distension. There is no tenderness.   Musculoskeletal:   Minimal RLE pedal edema   Neurological: He is alert and oriented to person, place, and time.   Skin:   Wound over sole of base of right 4th toe. No expressed purulent drainage.  About 3.5 cm. Surround erythema extending from dorsum of foot to medial aspect of shin. Bluish discoloration of 4th digit.       Fluids    Intake/Output Summary (Last 24 hours) at 08/10/18 1234  Last data filed at 08/10/18 1100   Gross per 24 hour   Intake              620 ml   Output             1150 ml   Net             -530 ml       Laboratory  Recent Labs      08/09/18   1854  08/10/18   0936   WBC  17.5*  13.5*   RBC  4.71  4.45*   HEMOGLOBIN  13.2*  12.4*   HEMATOCRIT  38.8*  36.1*   MCV  82.4  81.1*   MCH  28.0  27.9   MCHC  34.0  34.3   RDW  40.1  39.0   PLATELETCT  341  297   MPV  9.7  9.9     Recent Labs      08/09/18   1854   SODIUM  132*   POTASSIUM  4.2   CHLORIDE  97   CO2  23   GLUCOSE  420*   BUN  22   CREATININE  1.31   CALCIUM  9.8                   Imaging  DX-FOOT-COMPLETE 3+ RIGHT   Final Result      1.  No acute right foot fracture or dislocation.      2.  Soft tissue swelling surrounding the 4th and 5th toes.      3.  No plain film evidence of acute osteomyelitis.      4.  Degenerative or post inflammatory irregularity of the right 5th MTP joint and IP joint.      5.  Moderate hallux valgus deformity.      MR-FOOT-WITH & W/O RIGHT    (Results Pending)        Assessment/Plan  * Diabetic foot infection (HCC)   Assessment & Plan    - cont zyvox and unasyn  - ID and ortho consulted, recs appreciated  - pending MRI results  - pending limb preservation evaluation          Uncontrolled diabetes mellitus (HCC)   Assessment & Plan    - cont lantus 30U qPM, ISS, hypoglycemic protocol, diabetic diet  - A1c is 12  - diabetes education ordered        Hyponatremia   Assessment & Plan    - likely due to hyperglycemia  - will check Na levels in AM        Hyperlipidemia- (present on admission)   Assessment & Plan    continue home Lipitor.        Hypertension- (present on admission)   Assessment & Plan    continue home Prinivil and monitor  - will consider adding amlodipine if BP remains elevated

## 2018-08-10 NOTE — PROGRESS NOTES
Recieved call from  in ER. felt it was not necessary to move forward sliding scale coverage despite elevated glucose levels.

## 2018-08-10 NOTE — PROGRESS NOTES
"Pharmacy Kinetics 62 y.o. male on vancomycin day # 2 8/10/2018    Currently on Vancomycin Pulse Dosing:   Vancomycin 2500mg iv x1 : 18    Indication for Treatment: SSTI     Pertinent history per medical record: Admitted on 2018 for Diabetic foot infection.     63 yo male with PMH of DLP, HTN, & T2DM presents from urgent care for discolored R 4th toe with an open wound on the ball of his R foot.  Upon PE, pt found to have open wound of 3rd & 4th toe on R foot with discoloration and purulence     Other antibiotics: Unasyn 3 g iv q6h     Allergies: Patient has no known allergies.      List concerns for renal function:\"  - Leukocytosis, tachycardia  - Low grade fever  - Elevated SCr, BMI 33kg/m2     Pertinent cultures to date:   - None    Recent Labs      18   1854   WBC  17.5*   NEUTSPOLYS  84.50*     Recent Labs      18   1854   BUN  22   CREATININE  1.31     No results for input(s): VANCOTROUGH, VANCOPEAK, VANCORANDOM in the last 72 hours.  Intake/Output Summary (Last 24 hours) at 08/10/18 0920  Last data filed at 08/10/18 0810   Gross per 24 hour   Intake              380 ml   Output              750 ml   Net             -370 ml      Blood pressure 141/87, pulse 94, temperature 36.8 °C (98.3 °F), resp. rate 18, height 1.803 m (5' 10.98\"), weight 98.8 kg (217 lb 13 oz), SpO2 94 %. Temp (24hrs), Av.9 °C (98.5 °F), Min:36.6 °C (97.9 °F), Max:37.2 °C (99 °F)         A/P        1. Vancomycin dose change: Pulse dose based on vancomycin levels  2. Next vancomycin level: 12-hour random - ordered for 8/10@0800  3. Goal trough: 12-16 mcg/mL  4. Comments: Vancomycin pulse dosing 2/2 accumulation risk factors. Leukocytosis on admit. Afebrile over interval , no cx @ this time. Random level today , ordered daily BMP.  Pharmacy to follow and make dose adjustments as clinically appropriate.    Jorden MontesD BCPS   "

## 2018-08-10 NOTE — CARE PLAN
Problem: Communication  Goal: The ability to communicate needs accurately and effectively will improve  Outcome: PROGRESSING AS EXPECTED  Pt uses call light effectively

## 2018-08-10 NOTE — ASSESSMENT & PLAN NOTE
- ID and ortho consulted, recs appreciated  -  limb preservation evaluation completed  - MRI of right foot showing 4th and 5th toe osteomyelitis, septic arthritis, and cellulitis  - 8/12 right 4th toe amputation done  - Wound Cx + group b strep, strep viridans;  Cont unasyn until 9/10 per ID. He does not want long-term antibiotics. ID says can go on Augmentin if so

## 2018-08-10 NOTE — H&P
Hospital Medicine History and Physical    Date of Service  8/9/2018    Chief Complaint  Chief Complaint   Patient presents with   • Toe Pain     (R) 4th toe,  with swelling and discoloration noted.  first noticed 1 week ago,  started bleeding today.  toe is warm to touch,  hx of DM.    • Sent from Urgent Care       History of Presenting Illness  62 y.o. male who presented on 8/9/2018 after being seen at urgent care for infected diabetic foot.  Patient is a very poor historian, it is very difficult to get any history from him.  Patient states that his foot started bleeding today and he noted an infection on the balls of his right foot as well as discoloration of his fourth toe which she thinks began 3 days ago.  On bedside exam, these wounds are clearly chronic and I suspect that they have been present for several weeks.  Patient denies any fevers, chills, nausea, vomiting, headache, chest pain, shortness of breath, abdominal pain, diarrhea or dysuria.  He was last seen by his primary care provider by his report several months ago.  He has not been on any antibiotics.  He went to urgent care today who noted that he had a discolored right fourth toe with a open wound on the balls of his right foot and was sent to the emergency room for further treatment and evaluation.  Upon assessment here, he is noted to be hyperglycemic without evidence of ketosis.      Primary Care Physician  JOSE GonzalezPYVONNE.    Consultants  Pending    Code Status  Full    Review of Systems  Review of Systems   Constitutional: Negative for chills and fever.   HENT: Negative for congestion and sore throat.    Eyes: Negative for photophobia.   Respiratory: Negative for cough, shortness of breath and wheezing.    Cardiovascular: Negative for chest pain and palpitations.   Gastrointestinal: Negative for abdominal pain, diarrhea, nausea and vomiting.   Genitourinary: Negative for dysuria.   Musculoskeletal: Negative for myalgias.        Right  "foot pain   Skin:        \"My foots infected\"   Neurological: Negative for dizziness, tingling, focal weakness and headaches.   Psychiatric/Behavioral: Negative for depression and suicidal ideas.        Past Medical History  Past Medical History:   Diagnosis Date   • Hyperlipidemia    • Hypertension    • Type II or unspecified type diabetes mellitus without mention of complication, not stated as uncontrolled        Surgical History  Patient denies    Medications  No current facility-administered medications on file prior to encounter.      Current Outpatient Prescriptions on File Prior to Encounter   Medication Sig Dispense Refill   • Cholecalciferol 4000 units Cap Take 1 Capsule by mouth every day. 30 Cap 5   • insulin glargine (LANTUS) 100 UNIT/ML Solution Pen-injector injection Inject 30 Units as instructed every evening. 15 mL 5   • lisinopril (PRINIVIL, ZESTRIL) 40 MG tablet Take 1 Tab by mouth every day. 30 Tab 5   • metFORMIN (GLUCOPHAGE) 850 MG Tab Take 1 Tab by mouth 3 times a day, with meals. 90 Tab 5   • atorvastatin (LIPITOR) 20 MG Tab Take 1 Tab by mouth every day. 30 Tab 5   • loratadine (CLARITIN) 10 MG Tab Take 1 Tab by mouth every day. 30 Tab 2       Family History  Family History   Problem Relation Age of Onset   • Stroke Mother    • Heart Disease Mother    • Diabetes Mother    • Lung Disease Father        Social History  Social History   Substance Use Topics   • Smoking status: Never Smoker   • Smokeless tobacco: Never Used   • Alcohol use Yes      Comment: once in a while. A couple beers per week.       Allergies  No Known Allergies     Physical Exam  Laboratory   Hemodynamics  Temp (24hrs), Av.6 °C (97.9 °F), Min:36.6 °C (97.9 °F), Max:36.6 °C (97.9 °F)   Temperature: 36.6 °C (97.9 °F)  Pulse  Av  Min: 122  Max: 122    Blood Pressure: 148/97, NIBP: 151/94      Respiratory      Respiration: 18, Pulse Oximetry: 95 %             Physical Exam   Constitutional: He is oriented to person, " place, and time. No distress.   HENT:   Head: Normocephalic and atraumatic.   Right Ear: External ear normal.   Left Ear: External ear normal.   Eyes: EOM are normal. Right eye exhibits no discharge. Left eye exhibits no discharge.   Neck: Neck supple. No JVD present.   Cardiovascular: Normal rate, regular rhythm and normal heart sounds.    Pulmonary/Chest: Effort normal and breath sounds normal. No respiratory distress. He exhibits no tenderness.   Abdominal: Soft. Bowel sounds are normal. He exhibits no distension. There is no tenderness.   Musculoskeletal: He exhibits no edema.   Neurological: He is alert and oriented to person, place, and time. No cranial nerve deficit.   Skin: Skin is dry. He is not diaphoretic. No erythema.   Psychiatric:   Odd affect, very flat and does not make eye contact.  Poor historian.   Nursing note and vitals reviewed.    Capillary refill less than 3 seconds, distal pulses intact    Recent Labs      08/09/18   1854   WBC  17.5*   RBC  4.71   HEMOGLOBIN  13.2*   HEMATOCRIT  38.8*   MCV  82.4   MCH  28.0   MCHC  34.0   RDW  40.1   PLATELETCT  341   MPV  9.7     Recent Labs      08/09/18   1854   SODIUM  132*   POTASSIUM  4.2   CHLORIDE  97   CO2  23   GLUCOSE  420*   BUN  22   CREATININE  1.31   CALCIUM  9.8     Recent Labs      08/09/18   1854   GLUCOSE  420*                 No results found for: TROPONINI    Imaging  Dx-foot-complete 3+ Right    Result Date: 8/9/2018 8/9/2018 6:43 PM HISTORY/REASON FOR EXAM:  Right foot pain and swelling. Multiple open wounds. TECHNIQUE/EXAM DESCRIPTION AND NUMBER OF VIEWS: 3 views of the RIGHT foot. COMPARISON:  None. FINDINGS: No acute fracture or dislocation is present. There is a moderate hallux valgus deformity present. There is soft tissue swelling surrounding the 4th and 5th toes. There is irregularity of the 5th MTP joint and the 5th IP joint without evidence of acute erosion. This may represent prior injury or inflammation. No lytic or  blastic bony defect is identified to suggest acute osteomyelitis.     1.  No acute right foot fracture or dislocation. 2.  Soft tissue swelling surrounding the 4th and 5th toes. 3.  No plain film evidence of acute osteomyelitis. 4.  Degenerative or post inflammatory irregularity of the right 5th MTP joint and IP joint. 5.  Moderate hallux valgus deformity.     Assessment/Plan     Anticipate that patient will need greater than 2 midnights for management of the discussed medical issues.    * Diabetic foot infection (HCC)   Assessment & Plan    Patient has an open wound at the base of his third and fourth toe of the right foot as well as a discolored fourth toe with open wound and purulence.  While he reports that this has only been going on for the last 3 days, I suspect that he has had weeks of a nonhealing diabetic foot wound.  My suspicion is high for underlying osteomyelitis.  I will check an MRI to confirm and in the meantime, I have started him on IV vancomycin and Unasyn.  We will try to obtain a wound culture and sent for speciation and sensitivities.  I requested a limb preservation service consultation, pending the results of the MRI, will most likely also consult orthopedic surgery and infectious disease.        Uncontrolled diabetes mellitus (HCC)   Assessment & Plan    Patient's blood sugars in the 400s, I suspect this is close to his usual baseline.  We will get better control in the hospital to assist with wound healing.  He does have a very mild anion gap but otherwise his CO2 is normal and there is no evidence of DKA.  I will resume his Lantus, monitor him with Accu-Cheks and insulin sliding scale and adjust all medications as needed.  He will benefit from diabetic education which has been ordered, I will check an HbA1c.        Hyponatremia   Assessment & Plan    I suspect this is second to his hyperglycemia and is most likely factitious.  We will plan to fluid hydrate him overnight and repeat  chemistries in the morning.  Check osmolality levels.        Hyperlipidemia- (present on admission)   Assessment & Plan    This is chronic and stable, patient denies any chest pain, continue home Lipitor.        Hypertension- (present on admission)   Assessment & Plan    Blood pressure is high normal, continue home Prinivil and monitor.  We may need to add a second agent to get better control.          Prophylaxis: Heparin for DVT prophylaxis, no PPI indicated, bowel protocol as needed

## 2018-08-10 NOTE — PROGRESS NOTES
Report Received from Day RN, assumed care @1900  Pt ambulated to bed from jess, hand held assist  A/O x 4  VSS  Pain- denies  O2-RA  IV- R AC patent  Diet- diabetic, denies nausea  Void-pos  BM-pta  Wound- 4th R toe, Swelling open DM Ulcer, non blanching whiteness  Open DM ulcer on R foot  Bed Locked in Lowest Position  Call light in reach

## 2018-08-10 NOTE — PROGRESS NOTES
"Pharmacy Kinetics 62 y.o. male on vancomycin day # 1 2018    Currently on Vancomycin 2500 mg iv load x1, given @2000    Indication for Treatment: SSTI    Pertinent history per medical record: Admitted on 2018 for Diabetic foot infection.    61 yo male with PMH of DLP, HTN, & T2DM presents from urgent care for discolored R 4th toe with an open wound on the ball of his R foot.  Upon PE, pt found to have open wound of 3rd & 4th toe on R foot with discoloration and purulence    Other antibiotics: Unasyn 3 g iv q6h    Allergies: Patient has no known allergies.     List concerns for renal function (possible concerns include abnormal LFTs, BUN/SCr ratio > 20:1, CHF, obesity, malnutrition/low albumin, hypermetabolic state (SIRS), pressors/hypotension, nephrotoxic drugs, etc.):   - Leukocytosis, tachycardia  - Low grade fever  - Mild SUKHWINDER from baseline    Pertinent cultures to date:   - None    Recent Labs      18   1854   WBC  17.5*   NEUTSPOLYS  84.50*     Recent Labs      18   1854   BUN  22   CREATININE  1.31     No results for input(s): VANCOTROUGH, VANCOPEAK, VANCORANDOM in the last 72 hours.No intake or output data in the 24 hours ending 18 2126   Blood pressure 148/97, pulse (!) 122, temperature 36.6 °C (97.9 °F), resp. rate 18, height 1.803 m (5' 11\"), weight 98.8 kg (217 lb 13 oz), SpO2 95 %. Temp (24hrs), Av.6 °C (97.9 °F), Min:36.6 °C (97.9 °F), Max:36.6 °C (97.9 °F)      A/P   1. Vancomycin dose change: Pulse dose based on vancomycin levels  2. Next vancomycin level: 12-hour random - ordered for 8/10@0800  3. Goal trough: 12 to 16 mcg/mL  4. Comments: Due to mild SUKHWINDER, will pulse dose subsequent doses based on ordered random level until renal function improves.  Pharmacy to follow and make dose adjustments as clinically appropriate.      Naun Walter, PharmD      "

## 2018-08-10 NOTE — ED NOTES
Report given to receiving RN Shawn for T423-2. Questions answered. Pt on way up to floor with transport. All belongings accounted for.

## 2018-08-10 NOTE — PROGRESS NOTES
2 RN skin Check    Pt has swelling of the R foot and ankle, 4th R toe in pale, cyanotic and unblanchable w/ opendiabetic ulcer on bottom, open diabetic ulcer on bottom of R foot. Pt back and shoulders sunburned.  No other areas of concern

## 2018-08-10 NOTE — ASSESSMENT & PLAN NOTE
Hyperglycemia  - cont lantus 30U qPM, ISS, hypoglycemic protocol, diabetic diet  - A1c is 12  - diabetes education  - cont home metformin  - improved control  - hypoglycemic protocol on board

## 2018-08-10 NOTE — CONSULTS
"INFECTIOUS DISEASES INPATIENT CONSULT NOTE     Date of Service: 8/10/2018    Consult Requested By: Milagros Ovalle M.D.    Reason for Consultation: Right diabetic foot infection    History of Present Illness:   Louis Orourke is a 62 y.o. man with a history of paranoid schizophrenia, hypertension, hyperlipidemia and type 2 diabetes mellitus admitted 8/9/2018 for right fourth toe swelling and discoloration.  Patient is a very poor historian and is disoriented to place and time so history is obtained primarily from the medical records.  Patient believes he is currently in the hospital because he states \"I have paranoid schizophrenia and I am crazy.\"  He states that he has had a chronic wound on his right foot for approximately 4-5 days however the appearance of his wounds suggest that they have been there much longer.  Early on the day of admission he noted that his right foot started bleeding associated with discoloration of his right fourth toe.  He denied any fevers, chills, nausea, vomiting, abdominal pain or diarrhea.  Given his concerns for worsening infection, he presented to urgent care center where he was noted to have concerns for infection and suggested that he go to the emergency room for further evaluation and treatment.  On presentation, he was afebrile with a leukocytosis of 17.5.  X-ray of the right foot showed soft tissue swelling surrounding the fourth and fifth toes of the right foot but no radiologic evidence of acute osteomyelitis.  Wound cultures were obtained and are currently pending.  Patient was started on Unasyn and vancomycin.  Infectious disease service consulted for further antibiotic recommendations and management.    Review Of Systems:  Limited review of systems as patient with paranoid schizophrenia not oriented to place or time.    PMH:   Past Medical History:   Diagnosis Date   • Hyperlipidemia    • Hypertension    • Type II or unspecified type diabetes mellitus without mention of " complication, not stated as uncontrolled    Paranoid schizophrenia    PSH:  Patient denies any surgery    FAMILY HX:  Family History   Problem Relation Age of Onset   • Stroke Mother    • Heart Disease Mother    • Diabetes Mother    • Lung Disease Father    Positive for paranoid schizophrenia with his brother  And sister with diabetes    SOCIAL HX:  Social History     Social History   • Marital status: Single     Spouse name: N/A   • Number of children: N/A   • Years of education: N/A     Occupational History   • Not on file.     Social History Main Topics   • Smoking status: Never Smoker   • Smokeless tobacco: Never Used   • Alcohol use Yes      Comment: once in a while. A couple beers per week.   • Drug use: Yes     Types: Marijuana      Comment: Once in a while Crank, years ago LSD   • Sexual activity: Not Currently     Other Topics Concern   • Not on file     Social History Narrative   • No narrative on file     History   Smoking Status   • Never Smoker   Smokeless Tobacco   • Never Used     History   Alcohol Use   • Yes     Comment: once in a while. A couple beers per week.       Allergies/Intolerances:  No Known Allergies    History reviewed with the patient and medical records    Other Current Medications:    Current Facility-Administered Medications:   •  lisinopril (PRINIVIL) tablet 40 mg, 40 mg, Oral, DAILY, Bailey Pinon M.D., 40 mg at 08/10/18 0616  •  atorvastatin (LIPITOR) tablet 20 mg, 20 mg, Oral, DAILY, Bailey Pinon M.D., 20 mg at 08/10/18 0616  •  senna-docusate (PERICOLACE or SENOKOT S) 8.6-50 MG per tablet 2 Tab, 2 Tab, Oral, BID, 2 Tab at 08/10/18 0615 **AND** polyethylene glycol/lytes (MIRALAX) PACKET 1 Packet, 1 Packet, Oral, QDAY PRN **AND** magnesium hydroxide (MILK OF MAGNESIA) suspension 30 mL, 30 mL, Oral, QDAY PRN **AND** bisacodyl (DULCOLAX) suppository 10 mg, 10 mg, Rectal, QDAY PRN, Bailey Pinon M.D.  •  NS infusion 2,000 mL, 2,000 mL, Intravenous, Once, Bailey Pinon  "M.D.  •  NS (BOLUS) infusion 500 mL, 500 mL, Intravenous, Once PRN, Bailey Pinon M.D.  •  heparin injection 5,000 Units, 5,000 Units, Subcutaneous, Q8HRS, Bailey Pinon M.D., 5,000 Units at 08/10/18 0614  •  acetaminophen (TYLENOL) tablet 650 mg, 650 mg, Oral, Q6HRS PRN, Bailey Pinon M.D.  •  ampicillin/sulbactam (UNASYN) 3 g in  mL IVPB, 3 g, Intravenous, Q6HRS, Bailey Pinon M.D., Stopped at 08/10/18 0643  •  MD ALERT... vancomycin per pharmacy protocol 1 Each, 1 Each, Other, pharmacy to dose, Bailey Pinon M.D.  •  insulin regular (HUMULIN R) injection 2-9 Units, 2-9 Units, Subcutaneous, 4X/DAY ACHS, 5 Units at 08/10/18 0619 **AND** Accu-Chek ACHS, , , Q AC AND BEDTIME(S) **AND** NOTIFY MD and PharmD, , , Once **AND** glucose 4 g chewable tablet 16 g, 16 g, Oral, Q15 MIN PRN **AND** dextrose 50% (D50W) injection 25 mL, 25 mL, Intravenous, Q15 MIN PRN, Bailey Pinon M.D.  •  ondansetron (ZOFRAN) syringe/vial injection 4 mg, 4 mg, Intravenous, Q4HRS PRN, Bailey Pinon M.D.  •  ondansetron (ZOFRAN ODT) dispertab 4 mg, 4 mg, Oral, Q4HRS PRN, Bailey Pinon M.D.  •  promethazine (PHENERGAN) tablet 12.5-25 mg, 12.5-25 mg, Oral, Q4HRS PRN, Bailey Pinon M.D.  •  promethazine (PHENERGAN) suppository 12.5-25 mg, 12.5-25 mg, Rectal, Q4HRS PRN, Bailey Pinon M.D.  •  prochlorperazine (COMPAZINE) injection 5-10 mg, 5-10 mg, Intravenous, Q4HRS PRN, Bailey Pinon M.D.  •  insulin glargine (LANTUS) injection 30 Units, 30 Units, Subcutaneous, Q EVENING, Bailey Pinon M.D.  •  labetalol (NORMODYNE,TRANDATE) injection 10 mg, 10 mg, Intravenous, Q4HRS PRN, Bailey Pinon M.D.  [unfilled]    Most Recent Vital Signs:  /87   Pulse 94   Temp 36.8 °C (98.3 °F)   Resp 18   Ht 1.803 m (5' 10.98\")   Wt 98.8 kg (217 lb 13 oz)   SpO2 94%   BMI 30.39 kg/m²   Temp  Av.9 °C (98.5 °F)  Min: 36.6 °C (97.9 °F)  Max: 37.2 °C (99 °F)    Physical Exam:  General: Disheveled appearance, no " acute distress  HEENT: sclera anicteric, PERRL, EOMI, MMM, no oral lesions  Neck: supple, no lymphadenopathy  Chest: CTAB, no r/r/w, normal work of breathing.  Cardiac: RRR, normal S1 S2, no m/r/g   Abdomen: + bowel sounds, soft, non-tender, non-distended, no HSM  Extremities: Right 4th toe with significant erythema and bluish discoloration at the distal phalanx, right plantar wound with surrounding callus  Skin: See above  Neuro: Alert and oriented to self only, speech fluent, moves all extremities  Psych: Flat and odd affect, does not make eye contact    Pertinent Lab Results:  Recent Labs      08/09/18 1854   WBC  17.5*      Recent Labs      08/09/18 1854   HEMOGLOBIN  13.2*   HEMATOCRIT  38.8*   MCV  82.4   MCH  28.0   PLATELETCT  341         Recent Labs      08/09/18 1854   SODIUM  132*   POTASSIUM  4.2   CHLORIDE  97   CO2  23   CREATININE  1.31        No results for input(s): ALBUMIN in the last 72 hours.    Invalid input(s): AST, ALT, ALKPHOS, BILITOT, TOTALBILIRUB, BILIRUBINTOT, BILIRUBINDIR, BILIRUBININD, ALKALINEPHOS     Pertinent Micro:  Results     Procedure Component Value Units Date/Time    CULTURE WOUND W/O GRAM STAIN [[id number]] Collected:  08/09/18 2108    Order Status:  Completed Specimen:  Wound from Right Foot Updated:  08/09/18 2221    Culture Respiratory W/ GRM STN [[id number]]     Order Status:  Completed Specimen:  Respirate from Sputum         No results found for: BLOODCULTU, BLDCULT, BCHOLD     Studies:  Dx-foot-complete 3+ Right    Result Date: 8/9/2018 8/9/2018 6:43 PM HISTORY/REASON FOR EXAM:  Right foot pain and swelling. Multiple open wounds. TECHNIQUE/EXAM DESCRIPTION AND NUMBER OF VIEWS: 3 views of the RIGHT foot. COMPARISON:  None. FINDINGS: No acute fracture or dislocation is present. There is a moderate hallux valgus deformity present. There is soft tissue swelling surrounding the 4th and 5th toes. There is irregularity of the 5th MTP joint and the 5th IP joint without  evidence of acute erosion. This may represent prior injury or inflammation. No lytic or blastic bony defect is identified to suggest acute osteomyelitis.     1.  No acute right foot fracture or dislocation. 2.  Soft tissue swelling surrounding the 4th and 5th toes. 3.  No plain film evidence of acute osteomyelitis. 4.  Degenerative or post inflammatory irregularity of the right 5th MTP joint and IP joint. 5.  Moderate hallux valgus deformity.      IMPRESSION:   1.  Right diabetic foot infection, concern for possible osteomyelitis   2.  Acute kidney injury  3.  Leukocytosis  4.  Type 2 diabetes mellitus  5.  Paranoid schizophrenia      PLAN:   Louis Orourke is a 62 y.o. man with a history of paranoid schizophrenia, type 2 diabetes mellitus, hyperlipidemia and hypertension admitted for worsening edema, and discoloration of his right fourth toe.  He has signs of soft tissue infection but I am concerned about possible underlying osteomyelitis.  Agree with MRI.  Will follow wound cultures.  Discontinue vancomycin given SUKHWINDER and transition to linezolid pending further cultures.  Continue Unasyn as well.  Further recommendations per imaging findings and culture results.      Plan of care discussed with HAYLIE Ovalle M.D.. Will continue to follow    Marilee Bass M.D.

## 2018-08-10 NOTE — ED PROVIDER NOTES
ED Provider Note    Scribed for Althea Hart M.D. by Nadeem Carrillo. 8/9/2018, 6:10 PM.    Primary care provider: NAE Gonzalez  Means of arrival: Walk in  History obtained from: Patient  History limited by: Poor historian    CHIEF COMPLAINT  Chief Complaint   Patient presents with   • Toe Pain     (R) 4th toe,  with swelling and discoloration noted.  first noticed 1 week ago,  started bleeding today.  toe is warm to touch,  hx of DM.    • Sent from Urgent Care       HPI  Louis Orourke is a 62 y.o. Male with a history of diabetes who presents to the Emergency Department for evaluation of constant 4th right toe pain onset today. The patient confirms associated swelling onset 3 days ago. He denies any fevers or nausea. He does not know how long he has had ulcers to his right bottom foot. He states that he saw Dr. Srinivas Herrera, when his 4th right toe began bleeding. He denies any exacerbating or alleviating factors. He states that he has been taking his diabetes medications as prescribed, but has not been checking his blood glucose levels. He denies any allergies.    REVIEW OF SYSTEMS  Pertinent positives include right 4th toe pain and swelling. Pertinent negatives include no fevers or nausea. As above, all other systems reviewed and are negative.   See HPI for further details. C    PAST MEDICAL HISTORY  Past Medical History:   Diagnosis Date   • Hyperlipidemia    • Hypertension    • Type II or unspecified type diabetes mellitus without mention of complication, not stated as uncontrolled      SURGICAL HISTORY  None noted.    SOCIAL HISTORY  Social History   Substance Use Topics   • Smoking status: Never Smoker   • Smokeless tobacco: Never Used   • Alcohol use Yes      Comment: once in a while. A couple beers per week.      History   Drug Use   • Types: Marijuana     Comment: Once in a while Crank, years ago LSD       FAMILY HISTORY  Family History   Problem Relation Age of Onset   • Stroke  "Mother    • Heart Disease Mother    • Diabetes Mother    • Lung Disease Father        CURRENT MEDICATIONS    Current Facility-Administered Medications:   •  vancomycin 2,500 mg in  mL IVPB, 25 mg/kg, Intravenous, Once, Althea Hart M.D., Last Rate: 166.7 mL/hr at 08/09/18 2013, 2,500 mg at 08/09/18 2013    Current Outpatient Prescriptions:   •  Cholecalciferol 4000 units Cap, Take 1 Capsule by mouth every day., Disp: 30 Cap, Rfl: 5  •  insulin glargine (LANTUS) 100 UNIT/ML Solution Pen-injector injection, Inject 30 Units as instructed every evening., Disp: 15 mL, Rfl: 5  •  lisinopril (PRINIVIL, ZESTRIL) 40 MG tablet, Take 1 Tab by mouth every day., Disp: 30 Tab, Rfl: 5  •  metFORMIN (GLUCOPHAGE) 850 MG Tab, Take 1 Tab by mouth 3 times a day, with meals., Disp: 90 Tab, Rfl: 5  •  atorvastatin (LIPITOR) 20 MG Tab, Take 1 Tab by mouth every day., Disp: 30 Tab, Rfl: 5  •  loratadine (CLARITIN) 10 MG Tab, Take 1 Tab by mouth every day., Disp: 30 Tab, Rfl: 2      ALLERGIES  No Known Allergies    PHYSICAL EXAM  VITAL SIGNS: /97   Pulse (!) 122   Temp 36.6 °C (97.9 °F)   Resp 18   Ht 1.803 m (5' 11\")   Wt 98.8 kg (217 lb 13 oz)   SpO2 96%   BMI 30.38 kg/m²   Vitals reviewed.    Consitutional: Well-developed, well-nourished. Negative for: distress.  HENT: Normocephalic, right external ear normal, left external ear normal, oropharynx clear and moist.  Eyes: Conjunctivae normal, extraocular movements normal. Negative for: discharge in right and left eye, icterus.  Neck: Range of motion normal, supple. Negative for cervical adenopathy.  Cardiovascular: Trace dorsalis pedis pulse to the right foot.   Abdominal: Soft, bowel sounds normal. Negative for: distention, tenderness, rebound, guarding.  Musculoskeletal: Swelling to the dorsum of the right foot as well as the fourth and fifth toes.  Neurological: Alert and oriented x3. No focal deficits.  Skin: Warm, dry. Negative for rash. 1/4 sized pustular lesion " to the plantar surface of the mid foot, an old dime sized lesion with surrounding hyperkeratosis at the base of the 4th metatarsal, and at the Plantar surface of the 4th toe is an fungating ulcer surrounded by hard edema with sanguinous discharge. Edema and erythema to the dorsum of the right foot just above the ankle.  Dried healing ulcer to the plantar surface of the left foot at the base of the 5th MCP joint  Psych: Mood/affect flat.  Avoids eye contact.  Questionable judgment.    DIAGNOSTIC STUDIES / PROCEDURES    LABS  Results for orders placed or performed during the hospital encounter of 08/09/18   CBC WITH DIFFERENTIAL   Result Value Ref Range    WBC 17.5 (H) 4.8 - 10.8 K/uL    RBC 4.71 4.70 - 6.10 M/uL    Hemoglobin 13.2 (L) 14.0 - 18.0 g/dL    Hematocrit 38.8 (L) 42.0 - 52.0 %    MCV 82.4 81.4 - 97.8 fL    MCH 28.0 27.0 - 33.0 pg    MCHC 34.0 33.7 - 35.3 g/dL    RDW 40.1 35.9 - 50.0 fL    Platelet Count 341 164 - 446 K/uL    MPV 9.7 9.0 - 12.9 fL    Neutrophils-Polys 84.50 (H) 44.00 - 72.00 %    Lymphocytes 6.40 (L) 22.00 - 41.00 %    Monocytes 7.10 0.00 - 13.40 %    Eosinophils 0.50 0.00 - 6.90 %    Basophils 0.60 0.00 - 1.80 %    Immature Granulocytes 0.90 0.00 - 0.90 %    Nucleated RBC 0.00 /100 WBC    Neutrophils (Absolute) 14.80 (H) 1.82 - 7.42 K/uL    Lymphs (Absolute) 1.12 1.00 - 4.80 K/uL    Monos (Absolute) 1.24 (H) 0.00 - 0.85 K/uL    Eos (Absolute) 0.09 0.00 - 0.51 K/uL    Baso (Absolute) 0.11 0.00 - 0.12 K/uL    Immature Granulocytes (abs) 0.16 (H) 0.00 - 0.11 K/uL    NRBC (Absolute) 0.00 K/uL   BASIC METABOLIC PANEL   Result Value Ref Range    Sodium 132 (L) 135 - 145 mmol/L    Potassium 4.2 3.6 - 5.5 mmol/L    Chloride 97 96 - 112 mmol/L    Co2 23 20 - 33 mmol/L    Glucose 420 (H) 65 - 99 mg/dL    Bun 22 8 - 22 mg/dL    Creatinine 1.31 0.50 - 1.40 mg/dL    Calcium 9.8 8.5 - 10.5 mg/dL    Anion Gap 12.0 (H) 0.0 - 11.9   ESTIMATED GFR   Result Value Ref Range    GFR If  >60 >60  mL/min/1.73 m 2    GFR If Non African American 55 (A) >60 mL/min/1.73 m 2     All labs reviewed by me.    RADIOLOGY  DX-FOOT-COMPLETE 3+ RIGHT   Final Result      1.  No acute right foot fracture or dislocation.      2.  Soft tissue swelling surrounding the 4th and 5th toes.      3.  No plain film evidence of acute osteomyelitis.      4.  Degenerative or post inflammatory irregularity of the right 5th MTP joint and IP joint.      5.  Moderate hallux valgus deformity.        The radiologist's interpretation of all radiological studies have been reviewed by me.    COURSE & MEDICAL DECISION MAKING  Nursing notes, VS, PMSFHx reviewed in chart.      6:10 PM Patient seen and examined at bedside. The patient presents with 4th right toe pain and the differential diagnosis includes but is not limited to ulcer, abscess, osteomyelitis. Ordered DX right toes, CBC, and BMP. Patient will be treated with Unasyn 3 g for his symptoms. He will be given IV fluids for likely hyperglycemia and tachycardia. The patient was informed to schedule an appointment with a podiatrist for further evaluation and to prevent loss of toes.    7:51 PM Hospitalist paged.    8:21 PM Dr. Pinon, Hospitalist, consulted and agrees to admit the patient.    8:22 PM Patient was informed that he has a bad infection in his toe and his blood glucose levels are high. The patient was informed that he will be admitted to the hospital for further evaluation. The patient understands and agrees to be admitted.    DISPOSITION:  Patient will be admitted to Dr. Pinon, Hospitalist in guarded condition.      FINAL IMPRESSION  1. Diabetic infection of right foot (HCC)    2. Type 2 diabetes mellitus with foot ulcer, with long-term current use of insulin (HCC)         The note accurately reflects work and decisions made by me.  Althea Hart  8/10/2018  12:27 AM

## 2018-08-11 PROBLEM — M86.9 OSTEOMYELITIS DUE TO TYPE 2 DIABETES MELLITUS (HCC): Status: ACTIVE | Noted: 2018-08-09

## 2018-08-11 PROBLEM — E11.69 OSTEOMYELITIS DUE TO TYPE 2 DIABETES MELLITUS (HCC): Status: ACTIVE | Noted: 2018-08-09

## 2018-08-11 LAB
ANION GAP SERPL CALC-SCNC: 8 MMOL/L (ref 0–11.9)
BACTERIA WND AEROBE CULT: ABNORMAL
BACTERIA WND AEROBE CULT: ABNORMAL
BUN SERPL-MCNC: 15 MG/DL (ref 8–22)
CALCIUM SERPL-MCNC: 8.9 MG/DL (ref 8.5–10.5)
CHLORIDE SERPL-SCNC: 109 MMOL/L (ref 96–112)
CO2 SERPL-SCNC: 23 MMOL/L (ref 20–33)
CREAT SERPL-MCNC: 0.88 MG/DL (ref 0.5–1.4)
ERYTHROCYTE [DISTWIDTH] IN BLOOD BY AUTOMATED COUNT: 40.4 FL (ref 35.9–50)
GLUCOSE BLD-MCNC: 189 MG/DL (ref 65–99)
GLUCOSE BLD-MCNC: 191 MG/DL (ref 65–99)
GLUCOSE BLD-MCNC: 247 MG/DL (ref 65–99)
GLUCOSE SERPL-MCNC: 171 MG/DL (ref 65–99)
HCT VFR BLD AUTO: 36.7 % (ref 42–52)
HGB BLD-MCNC: 12.1 G/DL (ref 14–18)
MAGNESIUM SERPL-MCNC: 1.8 MG/DL (ref 1.5–2.5)
MCH RBC QN AUTO: 27.6 PG (ref 27–33)
MCHC RBC AUTO-ENTMCNC: 33 G/DL (ref 33.7–35.3)
MCV RBC AUTO: 83.8 FL (ref 81.4–97.8)
PLATELET # BLD AUTO: 330 K/UL (ref 164–446)
PMV BLD AUTO: 9.6 FL (ref 9–12.9)
POTASSIUM SERPL-SCNC: 3.6 MMOL/L (ref 3.6–5.5)
RBC # BLD AUTO: 4.38 M/UL (ref 4.7–6.1)
SIGNIFICANT IND 70042: ABNORMAL
SITE SITE: ABNORMAL
SODIUM SERPL-SCNC: 140 MMOL/L (ref 135–145)
SOURCE SOURCE: ABNORMAL
WBC # BLD AUTO: 9.7 K/UL (ref 4.8–10.8)

## 2018-08-11 PROCEDURE — 700111 HCHG RX REV CODE 636 W/ 250 OVERRIDE (IP): Performed by: HOSPITALIST

## 2018-08-11 PROCEDURE — 700102 HCHG RX REV CODE 250 W/ 637 OVERRIDE(OP): Performed by: INTERNAL MEDICINE

## 2018-08-11 PROCEDURE — 83735 ASSAY OF MAGNESIUM: CPT

## 2018-08-11 PROCEDURE — 700102 HCHG RX REV CODE 250 W/ 637 OVERRIDE(OP): Performed by: HOSPITALIST

## 2018-08-11 PROCEDURE — A9270 NON-COVERED ITEM OR SERVICE: HCPCS | Performed by: HOSPITALIST

## 2018-08-11 PROCEDURE — 85027 COMPLETE CBC AUTOMATED: CPT

## 2018-08-11 PROCEDURE — 99233 SBSQ HOSP IP/OBS HIGH 50: CPT | Performed by: INTERNAL MEDICINE

## 2018-08-11 PROCEDURE — A9270 NON-COVERED ITEM OR SERVICE: HCPCS | Performed by: INTERNAL MEDICINE

## 2018-08-11 PROCEDURE — 700105 HCHG RX REV CODE 258: Performed by: HOSPITALIST

## 2018-08-11 PROCEDURE — 99232 SBSQ HOSP IP/OBS MODERATE 35: CPT | Performed by: HOSPITALIST

## 2018-08-11 PROCEDURE — 770006 HCHG ROOM/CARE - MED/SURG/GYN SEMI*

## 2018-08-11 PROCEDURE — 36415 COLL VENOUS BLD VENIPUNCTURE: CPT

## 2018-08-11 PROCEDURE — 82962 GLUCOSE BLOOD TEST: CPT

## 2018-08-11 PROCEDURE — 80048 BASIC METABOLIC PNL TOTAL CA: CPT

## 2018-08-11 RX ORDER — AMLODIPINE BESYLATE 10 MG/1
5 TABLET ORAL
Status: DISCONTINUED | OUTPATIENT
Start: 2018-08-11 | End: 2018-08-24 | Stop reason: HOSPADM

## 2018-08-11 RX ORDER — MAGNESIUM SULFATE 1 G/100ML
1 INJECTION INTRAVENOUS ONCE
Status: COMPLETED | OUTPATIENT
Start: 2018-08-11 | End: 2018-08-11

## 2018-08-11 RX ADMIN — Medication 2 TABLET: at 18:36

## 2018-08-11 RX ADMIN — HEPARIN SODIUM 5000 UNITS: 5000 INJECTION, SOLUTION INTRAVENOUS; SUBCUTANEOUS at 12:58

## 2018-08-11 RX ADMIN — AMPICILLIN AND SULBACTAM 3 G: 2; 1 INJECTION, POWDER, FOR SOLUTION INTRAVENOUS at 06:02

## 2018-08-11 RX ADMIN — INSULIN HUMAN 3 UNITS: 100 INJECTION, SOLUTION PARENTERAL at 18:39

## 2018-08-11 RX ADMIN — AMPICILLIN AND SULBACTAM 3 G: 2; 1 INJECTION, POWDER, FOR SOLUTION INTRAVENOUS at 12:58

## 2018-08-11 RX ADMIN — AMLODIPINE BESYLATE 5 MG: 10 TABLET ORAL at 12:57

## 2018-08-11 RX ADMIN — HEPARIN SODIUM 5000 UNITS: 5000 INJECTION, SOLUTION INTRAVENOUS; SUBCUTANEOUS at 05:59

## 2018-08-11 RX ADMIN — HEPARIN SODIUM 5000 UNITS: 5000 INJECTION, SOLUTION INTRAVENOUS; SUBCUTANEOUS at 21:38

## 2018-08-11 RX ADMIN — LINEZOLID 600 MG: 600 TABLET, FILM COATED ORAL at 05:59

## 2018-08-11 RX ADMIN — LISINOPRIL 40 MG: 20 TABLET ORAL at 05:59

## 2018-08-11 RX ADMIN — AMPICILLIN AND SULBACTAM 3 G: 2; 1 INJECTION, POWDER, FOR SOLUTION INTRAVENOUS at 00:37

## 2018-08-11 RX ADMIN — INSULIN GLARGINE 30 UNITS: 100 INJECTION, SOLUTION SUBCUTANEOUS at 18:40

## 2018-08-11 RX ADMIN — Medication 2 TABLET: at 05:59

## 2018-08-11 RX ADMIN — INSULIN HUMAN 2 UNITS: 100 INJECTION, SOLUTION PARENTERAL at 21:35

## 2018-08-11 RX ADMIN — AMPICILLIN AND SULBACTAM 3 G: 2; 1 INJECTION, POWDER, FOR SOLUTION INTRAVENOUS at 18:36

## 2018-08-11 RX ADMIN — ATORVASTATIN CALCIUM 20 MG: 20 TABLET, FILM COATED ORAL at 05:58

## 2018-08-11 RX ADMIN — LINEZOLID 600 MG: 600 TABLET, FILM COATED ORAL at 18:36

## 2018-08-11 RX ADMIN — INSULIN HUMAN 2 UNITS: 100 INJECTION, SOLUTION PARENTERAL at 06:05

## 2018-08-11 RX ADMIN — MAGNESIUM SULFATE IN DEXTROSE 1 G: 10 INJECTION, SOLUTION INTRAVENOUS at 12:59

## 2018-08-11 ASSESSMENT — PAIN SCALES - GENERAL
PAINLEVEL_OUTOF10: 0

## 2018-08-11 ASSESSMENT — ENCOUNTER SYMPTOMS
CHILLS: 0
ABDOMINAL PAIN: 0
SPUTUM PRODUCTION: 0
NAUSEA: 0
HEADACHES: 0
HEARTBURN: 0
SORE THROAT: 0
DOUBLE VISION: 0
FEVER: 0
DIZZINESS: 0
PALPITATIONS: 0
BLURRED VISION: 0
LOSS OF CONSCIOUSNESS: 0
BACK PAIN: 0
SHORTNESS OF BREATH: 0
FALLS: 0
COUGH: 0

## 2018-08-11 ASSESSMENT — PATIENT HEALTH QUESTIONNAIRE - PHQ9
9. THOUGHTS THAT YOU WOULD BE BETTER OFF DEAD, OR OF HURTING YOURSELF: SEVERAL DAYS
3. TROUBLE FALLING OR STAYING ASLEEP OR SLEEPING TOO MUCH: NOT AT ALL
7. TROUBLE CONCENTRATING ON THINGS, SUCH AS READING THE NEWSPAPER OR WATCHING TELEVISION: NOT AT ALL
5. POOR APPETITE OR OVEREATING: NEARLY EVERY DAY
SUM OF ALL RESPONSES TO PHQ QUESTIONS 1-9: 12
2. FEELING DOWN, DEPRESSED, IRRITABLE, OR HOPELESS: NEARLY EVERY DAY
8. MOVING OR SPEAKING SO SLOWLY THAT OTHER PEOPLE COULD HAVE NOTICED. OR THE OPPOSITE, BEING SO FIGETY OR RESTLESS THAT YOU HAVE BEEN MOVING AROUND A LOT MORE THAN USUAL: NOT AT ALL
1. LITTLE INTEREST OR PLEASURE IN DOING THINGS: MORE THAN HALF THE DAYS
SUM OF ALL RESPONSES TO PHQ9 QUESTIONS 1 AND 2: 5
4. FEELING TIRED OR HAVING LITTLE ENERGY: NEARLY EVERY DAY
6. FEELING BAD ABOUT YOURSELF - OR THAT YOU ARE A FAILURE OR HAVE LET YOURSELF OR YOUR FAMILY DOWN: NOT AL ALL

## 2018-08-11 NOTE — PROGRESS NOTES
"Pt on legal hold. 1:1 sitter at bedside. Pt. Upset wants to use phone to call sister. Pt stated \" I was just kidding about killing myself.\"  "

## 2018-08-11 NOTE — DISCHARGE PLANNING
Informed that pt placed on Legal Hold. Sitter at bedside.  Certificate portion of hold not complete. Notified Dr. Ovalle, she will complete.  Pt expressed concerns about cats in home.  States he has sister in [place], Lulu Webb, does not know phone number. States he doesn't want anyone going into his home. Explained that we can call [facility] and he declined. Will ask LSW to follow up tomorrow re: cats.

## 2018-08-11 NOTE — PROGRESS NOTES
"LIMB PRESERVATION SERVICE NOTE:    Subjective:      Reason for Consultation: Right Foot - 4th Digit. Right Foot 4th MTH Callus. Left Foot 5th MTH Callus    History of Present Illness:     Patient is well known to LPS and outpatient wound care services.    Patient is a 62 y.o. male with a past medical history that includes type 2 diabetes, HLD, and HTN and is admitted to [facility] for diabetic foot infection for which an LPS consult has been requested for.  Pt states wound started a couple of days ago to his Right foot 4th digit but also states he has been told that he should have had an aputation in the past and it healed.  Patient was diagnosed with diabetes approximately 10 years ago per the pt, but the pt is a poor historian and is vague with history of wound as well. Pt currently manages with insulin.  Patient checks blood sugars infrequently          Pt adamantly refuses amputation or Irrigation or any surgery whatsoever as a possible treatment option despite repeated education on infection and risks of osteomyelitis, sepsis, and/or death.    Lab Results   Component Value Date/Time    HBA1C 12.0 (H) 08/10/2018 07:54 AM      Patient denies fevers chills, nausea, vomiting.     Pain:        Patient resting comfortably    Vitals  /94   Pulse 87   Temp 36.5 °C (97.7 °F)   Resp 17   Ht 1.803 m (5' 10.98\")   Wt 98.8 kg (217 lb 13 oz)   SpO2 93%   BMI 30.39 kg/m²       Objective:        PHYSICAL EXAMINATION:     General Appearance:  Well developed,  nourished, in no acute distress    Sensory Assessment       Patient sensation insensate bilaterally with light touch 10 of 10 points        Vascular Assessment       +2 DP,  +2 PT bilaterally    Orthotic, protective, supportive devices:     Offloading  None, PT does not wish any offloading    Wound Characteristics                                                    Location: Right Foot - 4th Digit   Initial Evaluation  Date:8/11/2018   Tissue Type and %: 100% Purple "   Periwound: Red   Drainage: Moderate Purulant   Exposed structures AMY   Wound Edges:   Open   Odor: Malodorous   S&S of Infection:   Edema/Erythema   Edema: Localized at Site   Sensation: Insensate               Measurements: Initial Evaluation  Date:8/11/2018   Length (cm) 80%   Width (cm)    Depth (cm)    Tract/undermine      Location: Right Foot - 4th MTH Plantar Ulcer   Initial Evaluation  Date:8/11/2018   Tissue Type and %: 100% Black   Periwound: Callus   Drainage: Moderate Purulant   Exposed structures AMY   Wound Edges:   Closed   Odor: Malodorous   S&S of Infection:   Edema/Erythema   Edema: Localized at Site   Sensation: Insensate               Measurements: Initial Evaluation  Date:8/11/2018   Length (cm) 3   Width (cm) 3   Depth (cm)    Tract/undermine      Location: Left Foot - 4th MTH Plantar Ulcer   Initial Evaluation  Date:8/11/2018   Tissue Type and %: 100% Black   Periwound: Callus   Drainage: None   Exposed structures AMY   Wound Edges:   Closed   Odor: Malodorous   S&S of Infection:   Edema/Erythema   Edema: Localized at Site   Sensation: Insensate               Measurements: Initial Evaluation  Date:8/11/2018   Length (cm) 2   Width (cm) 2   Depth (cm)    Tract/undermine      Tests and Measures:    Labs  Recent Labs      08/09/18   1854  08/10/18   0936  08/11/18   0418   WBC  17.5*  13.5*  9.7   RBC  4.71  4.45*  4.38*   HEMOGLOBIN  13.2*  12.4*  12.1*   HEMATOCRIT  38.8*  36.1*  36.7*   MCV  82.4  81.1*  83.8   MCH  28.0  27.9  27.6   MCHC  34.0  34.3  33.0*   RDW  40.1  39.0  40.4   PLATELETCT  341  297  330   MPV  9.7  9.9  9.6     Recent Labs      08/09/18   1854  08/10/18   0936  08/11/18   0418   SODIUM  132*  135  140   POTASSIUM  4.2  3.5*  3.6   CHLORIDE  97  103  109   CO2  23  22  23   GLUCOSE  420*  275*  171*   BUN  22  14  15       A1C 12.0%  ESR: 93  CRP: 20.11    GIOVANA    Pedal Pulses Present    Imaging    X-Ray: 8/9/18    DX-FOOT-COMPLETE 3+ RIGHT   Final Result      1.  No  acute right foot fracture or dislocation.      2.  Soft tissue swelling surrounding the 4th and 5th toes.      3.  No plain film evidence of acute osteomyelitis.      4.  Degenerative or post inflammatory irregularity of the right 5th MTP joint and IP joint.      5.  Moderate hallux valgus deformity.      MR-FOOT-WITH & W/O RIGHT    (Results Pending)     Infection Management  Microbiology NEG    Procedures:        Debridement :  NA     Cleansed with:     NS                                                                       Periwound protected with: skin prep     Primary dressing: adhesive foam     Secondary Dressing: roll guaze     Other:     NURSING TO CHANGE RIGHT FOOT DRESSING EVERY 48 HOURS AND PRN FOR SATURATION OR DISLODGEMENT  Nursing to cleanse wound/periwound with Normal Saline (NS).  Pat periwound dry and apply skin prep/No Sting to periwound.  Let air dry for 1-2 minutes.  Apply a piece of non adhesive foam, cut to size, to the wound bed wrap with roll guaze and secure hypafix tape.  Please take Weekly Wound Photos. Notify wound team if wound deteriorates or fails to progress.    Patient Education:  Implications of loss of protective sensation (LOPS) discussed with patient- including increased risk for amputation.  Advised to check foot at least daily, moisturize foot, and to always wear protective foot wear.        Plan:       Treatment Plan and Recommendations:    1. Labs\Imaging: Reviewed    2. Treatment:   Wound Care by Nursing, LPS to Follow.     Limb preservation status guarded, possible osteo given elevated ESR, CRP - Pt refusing Surgical intervention will D/W Dr Tuttle for additional attempt at intervention.                           Dressing Orders Updated. Skin Care Updated.      Nursing to change every 48 Hours and PRN for Saturation or Dislodgement     Antibiotics per ID  3.Collaboration:      Diabetes Educator Involved: A1C is 12.0% Pt educated on keeping BS less than 150 to control  infection and promote wound healing       Anticipated discharge plans (X):   SNF:            Home Care:            Outpatient Wound Center:        Self Care:             Other:            TBD: X    Professional Collaboration: D/W:  X

## 2018-08-11 NOTE — PROGRESS NOTES
"Paged by nurse at 12:02 pm that patient wanting to leave AMA. When I arrived to bedside patient was already dressed. I discussed with patient the risks of leaving AMA. When asked what patient would do if his infection worsened he stated \" I'm going to kill myself.\" When asked if he had any guns or a plan to kill himself he said no. Patient had earlier told the nurses he had \"lots of guns.\" A legal hold was initiated but patient was walking off the floor so security was called and patient was escorted to his room. Psychiatry has been called and consulted.    A total of 33 minutes of was spent providing these services.   "

## 2018-08-11 NOTE — PROGRESS NOTES
Received soft read of MRI:    Extensive soft tissue edema  Suspicious cellulitis  4th/5th osteomyelitis + septic arthritis

## 2018-08-11 NOTE — CARE PLAN
Problem: Safety  Goal: Will remain free from injury  Outcome: PROGRESSING AS EXPECTED  Call light within reach. Patient educated on fall precautions and safety measures. Patient verbalized understanding. Sitter continues to be at bedside.     Problem: Knowledge Deficit  Goal: Knowledge of disease process/condition, treatment plan, diagnostic tests, and medications will improve  Outcome: PROGRESSING AS EXPECTED  Educated patient on medications. Patient aware of medications as they relate to POC and disease process.

## 2018-08-11 NOTE — PROGRESS NOTES
"AA&Ox4. RA. Denies SOB.  Denies any pain.  Dressing to RLE placed by day RN no longer intact. \"It just came off\" per pt. Will replace once pt returns to floor.  Small drainage noted around wound. Skin red and warm to touch.   Tolerating diabetic diet. NPO at midnight. Denies N/V.  + void. LBM PTA.   Pt up with SBA.     "

## 2018-08-11 NOTE — CARE PLAN
Problem: Communication  Goal: The ability to communicate needs accurately and effectively will improve  Outcome: PROGRESSING AS EXPECTED  Education provided on importance of using call light to alert staff of patient needs. Pt demonstrates understanding by using call light appropriately.    Problem: Infection  Goal: Will remain free from infection  Outcome: PROGRESSING AS EXPECTED  Pt receiving IV abx.

## 2018-08-11 NOTE — PROGRESS NOTES
Infectious Disease Progress Note    Author: Marilee Bass M.D. Date & Time of service: 2018  1:50 PM    Chief Complaint:  Follow-up for right diabetic foot infection    Interval History:   AF WBC 9.7 patient denies any complaints however is a poor historian given paranoid schizophrenia, patient threatened to leave AMA  Labs Reviewed, Medications Reviewed, Radiology Reviewed and Wound Reviewed.    Review of Systems:  Review of Systems   Unable to perform ROS: Psychiatric disorder       Hemodynamics:  Temp (24hrs), Av.7 °C (98.1 °F), Min:36.5 °C (97.7 °F), Max:36.9 °C (98.4 °F)  Temperature: 36.9 °C (98.4 °F)  Pulse  Av.4  Min: 82  Max: 122   Blood Pressure: 145/75       Physical Exam:  Physical Exam   Constitutional: He appears well-developed.   Disheveled   HENT:   Head: Normocephalic and atraumatic.   Eyes: Pupils are equal, round, and reactive to light. EOM are normal.   Poor eye contact   Neck: Neck supple.   Cardiovascular: Normal rate and regular rhythm.    Pulmonary/Chest: Effort normal and breath sounds normal.   Abdominal: Soft. There is no tenderness.   Musculoskeletal:   Right second toe with significant edema and erythema with discolored sensation on the distal phalanx    right plantar wound with surrounding callus   Neurological: He is alert.   Psychiatric:   Flat and odd affect       Meds:    Current Facility-Administered Medications:   •  amLODIPine  •  magnesium sulfate  •  linezolid  •  lisinopril  •  atorvastatin  •  senna-docusate **AND** polyethylene glycol/lytes **AND** magnesium hydroxide **AND** bisacodyl  •  NS  •  heparin  •  acetaminophen  •  ampicillin-sulbactam (UNASYN) IV  •  insulin regular **AND** Accu-Chek ACHS **AND** NOTIFY MD and PharmD **AND** glucose 4 g **AND** dextrose 50%  •  ondansetron  •  ondansetron  •  promethazine  •  promethazine  •  prochlorperazine  •  insulin glargine  •  labetalol    Labs:  Recent Labs      18   1854  08/10/18   0936   08/11/18   0418   WBC  17.5*  13.5*  9.7   RBC  4.71  4.45*  4.38*   HEMOGLOBIN  13.2*  12.4*  12.1*   HEMATOCRIT  38.8*  36.1*  36.7*   MCV  82.4  81.1*  83.8   MCH  28.0  27.9  27.6   RDW  40.1  39.0  40.4   PLATELETCT  341  297  330   MPV  9.7  9.9  9.6   NEUTSPOLYS  84.50*  79.10*   --    LYMPHOCYTES  6.40*  9.90*   --    MONOCYTES  7.10  7.80   --    EOSINOPHILS  0.50  1.90   --    BASOPHILS  0.60  0.60   --      Recent Labs      08/09/18   1854  08/10/18   0936  08/11/18   0418   SODIUM  132*  135  140   POTASSIUM  4.2  3.5*  3.6   CHLORIDE  97  103  109   CO2  23  22  23   GLUCOSE  420*  275*  171*   BUN  22  14  15     Recent Labs      08/09/18   1854  08/10/18   0936  08/11/18 0418   CREATININE  1.31  0.87  0.88       Imaging:  Dx-foot-complete 3+ Right    Result Date: 8/9/2018 8/9/2018 6:43 PM HISTORY/REASON FOR EXAM:  Right foot pain and swelling. Multiple open wounds. TECHNIQUE/EXAM DESCRIPTION AND NUMBER OF VIEWS: 3 views of the RIGHT foot. COMPARISON:  None. FINDINGS: No acute fracture or dislocation is present. There is a moderate hallux valgus deformity present. There is soft tissue swelling surrounding the 4th and 5th toes. There is irregularity of the 5th MTP joint and the 5th IP joint without evidence of acute erosion. This may represent prior injury or inflammation. No lytic or blastic bony defect is identified to suggest acute osteomyelitis.     1.  No acute right foot fracture or dislocation. 2.  Soft tissue swelling surrounding the 4th and 5th toes. 3.  No plain film evidence of acute osteomyelitis. 4.  Degenerative or post inflammatory irregularity of the right 5th MTP joint and IP joint. 5.  Moderate hallux valgus deformity.      Micro:  Results     Procedure Component Value Units Date/Time    CULTURE WOUND W/O GRAM STAIN [[id number]]  (Abnormal) Collected:  08/09/18 1437    Order Status:  Completed Specimen:  Wound from Right Foot Updated:  08/11/18 1047     Significant Indicator POS  (POS)     Source WND     Site RIGHT FOOT     Culture Result Wound Light growth mixed skin phoebe. (A)      Streptococcus agalactiae (Group B)  Moderate growth   (A)    Culture Respiratory W/ GRM STN [[id number]]     Order Status:  Completed Specimen:  Respirate from Sputum           Assessment:  Active Hospital Problems    Diagnosis   • *Osteomyelitis due to type 2 diabetes mellitus (HCC) [E11.69, M86.9]   • Uncontrolled diabetes mellitus (HCC) [E11.65]   • Hypertension [I10]   • Hyponatremia [E87.1]   • Hyperlipidemia [E78.5]       Plan:  Right diabetic foot infection-additional workup  No fevers  Leukocytosis resolved  X-ray negative  ESR 93  MRI pending  Wound culture+ group B streptococcus  DC linezolid  Continue Unasyn  Wound care  LPS following  Anticipate he will need surgical debridement if not amputation  Duration of antibiotics per extent of surgery and operative findings    DM 2, poorly controlled  Hemoglobin A1c 12   Maintain blood sugars less than 150 to control infection and promote wound healing    Paranoid schizophrenia  Sitter at bedside  May be an impediment to his care and treatment    Discussed with internal medicine/Dr Ovalle.

## 2018-08-12 PROBLEM — E87.1 HYPONATREMIA: Status: RESOLVED | Noted: 2018-08-09 | Resolved: 2018-08-12

## 2018-08-12 LAB
ANION GAP SERPL CALC-SCNC: 9 MMOL/L (ref 0–11.9)
BUN SERPL-MCNC: 14 MG/DL (ref 8–22)
CALCIUM SERPL-MCNC: 8.9 MG/DL (ref 8.5–10.5)
CHLORIDE SERPL-SCNC: 111 MMOL/L (ref 96–112)
CO2 SERPL-SCNC: 21 MMOL/L (ref 20–33)
CREAT SERPL-MCNC: 0.8 MG/DL (ref 0.5–1.4)
GLUCOSE BLD-MCNC: 132 MG/DL (ref 65–99)
GLUCOSE BLD-MCNC: 133 MG/DL (ref 65–99)
GLUCOSE BLD-MCNC: 162 MG/DL (ref 65–99)
GLUCOSE SERPL-MCNC: 141 MG/DL (ref 65–99)
POTASSIUM SERPL-SCNC: 3.6 MMOL/L (ref 3.6–5.5)
SODIUM SERPL-SCNC: 141 MMOL/L (ref 135–145)

## 2018-08-12 PROCEDURE — 770001 HCHG ROOM/CARE - MED/SURG/GYN PRIV*

## 2018-08-12 PROCEDURE — 160002 HCHG RECOVERY MINUTES (STAT): Performed by: ORTHOPAEDIC SURGERY

## 2018-08-12 PROCEDURE — A9270 NON-COVERED ITEM OR SERVICE: HCPCS | Performed by: HOSPITALIST

## 2018-08-12 PROCEDURE — 160039 HCHG SURGERY MINUTES - EA ADDL 1 MIN LEVEL 3: Performed by: ORTHOPAEDIC SURGERY

## 2018-08-12 PROCEDURE — 99233 SBSQ HOSP IP/OBS HIGH 50: CPT | Performed by: INTERNAL MEDICINE

## 2018-08-12 PROCEDURE — 87205 SMEAR GRAM STAIN: CPT | Mod: 91

## 2018-08-12 PROCEDURE — 88305 TISSUE EXAM BY PATHOLOGIST: CPT

## 2018-08-12 PROCEDURE — 160028 HCHG SURGERY MINUTES - 1ST 30 MINS LEVEL 3: Performed by: ORTHOPAEDIC SURGERY

## 2018-08-12 PROCEDURE — 36415 COLL VENOUS BLD VENIPUNCTURE: CPT

## 2018-08-12 PROCEDURE — 88311 DECALCIFY TISSUE: CPT

## 2018-08-12 PROCEDURE — 501330 HCHG SET, CYSTO IRRIG TUBING: Performed by: ORTHOPAEDIC SURGERY

## 2018-08-12 PROCEDURE — 500881 HCHG PACK, EXTREMITY: Performed by: ORTHOPAEDIC SURGERY

## 2018-08-12 PROCEDURE — 160036 HCHG PACU - EA ADDL 30 MINS PHASE I: Performed by: ORTHOPAEDIC SURGERY

## 2018-08-12 PROCEDURE — 99231 SBSQ HOSP IP/OBS SF/LOW 25: CPT | Performed by: HOSPITALIST

## 2018-08-12 PROCEDURE — 160009 HCHG ANES TIME/MIN: Performed by: ORTHOPAEDIC SURGERY

## 2018-08-12 PROCEDURE — 87070 CULTURE OTHR SPECIMN AEROBIC: CPT

## 2018-08-12 PROCEDURE — 82962 GLUCOSE BLOOD TEST: CPT | Mod: 91

## 2018-08-12 PROCEDURE — 501838 HCHG SUTURE GENERAL: Performed by: ORTHOPAEDIC SURGERY

## 2018-08-12 PROCEDURE — 700102 HCHG RX REV CODE 250 W/ 637 OVERRIDE(OP): Performed by: HOSPITALIST

## 2018-08-12 PROCEDURE — 700111 HCHG RX REV CODE 636 W/ 250 OVERRIDE (IP): Performed by: HOSPITALIST

## 2018-08-12 PROCEDURE — 0HBNXZZ EXCISION OF LEFT FOOT SKIN, EXTERNAL APPROACH: ICD-10-PCS | Performed by: ORTHOPAEDIC SURGERY

## 2018-08-12 PROCEDURE — 160035 HCHG PACU - 1ST 60 MINS PHASE I: Performed by: ORTHOPAEDIC SURGERY

## 2018-08-12 PROCEDURE — 87015 SPECIMEN INFECT AGNT CONCNTJ: CPT

## 2018-08-12 PROCEDURE — 80048 BASIC METABOLIC PNL TOTAL CA: CPT

## 2018-08-12 PROCEDURE — 700111 HCHG RX REV CODE 636 W/ 250 OVERRIDE (IP)

## 2018-08-12 PROCEDURE — 700101 HCHG RX REV CODE 250

## 2018-08-12 PROCEDURE — 500440 HCHG DRESSING, STERILE ROLL (KERLIX): Performed by: ORTHOPAEDIC SURGERY

## 2018-08-12 PROCEDURE — 87075 CULTR BACTERIA EXCEPT BLOOD: CPT | Mod: 91

## 2018-08-12 PROCEDURE — 160048 HCHG OR STATISTICAL LEVEL 1-5: Performed by: ORTHOPAEDIC SURGERY

## 2018-08-12 PROCEDURE — 0Y6V0Z0 DETACHMENT AT RIGHT 4TH TOE, COMPLETE, OPEN APPROACH: ICD-10-PCS | Performed by: ORTHOPAEDIC SURGERY

## 2018-08-12 PROCEDURE — 700102 HCHG RX REV CODE 250 W/ 637 OVERRIDE(OP): Performed by: INTERNAL MEDICINE

## 2018-08-12 PROCEDURE — 700105 HCHG RX REV CODE 258: Performed by: HOSPITALIST

## 2018-08-12 PROCEDURE — 0JBQ0ZZ EXCISION OF RIGHT FOOT SUBCUTANEOUS TISSUE AND FASCIA, OPEN APPROACH: ICD-10-PCS | Performed by: ORTHOPAEDIC SURGERY

## 2018-08-12 PROCEDURE — A9270 NON-COVERED ITEM OR SERVICE: HCPCS | Performed by: INTERNAL MEDICINE

## 2018-08-12 PROCEDURE — 87077 CULTURE AEROBIC IDENTIFY: CPT

## 2018-08-12 PROCEDURE — A6223 GAUZE >16<=48 NO W/SAL W/O B: HCPCS | Performed by: ORTHOPAEDIC SURGERY

## 2018-08-12 RX ORDER — POTASSIUM CHLORIDE 20 MEQ/1
40 TABLET, EXTENDED RELEASE ORAL ONCE
Status: COMPLETED | OUTPATIENT
Start: 2018-08-12 | End: 2018-08-12

## 2018-08-12 RX ADMIN — LISINOPRIL 40 MG: 20 TABLET ORAL at 05:45

## 2018-08-12 RX ADMIN — AMPICILLIN AND SULBACTAM 3 G: 2; 1 INJECTION, POWDER, FOR SOLUTION INTRAVENOUS at 19:54

## 2018-08-12 RX ADMIN — POTASSIUM CHLORIDE 40 MEQ: 1500 TABLET, EXTENDED RELEASE ORAL at 11:42

## 2018-08-12 RX ADMIN — HEPARIN SODIUM 5000 UNITS: 5000 INJECTION, SOLUTION INTRAVENOUS; SUBCUTANEOUS at 05:44

## 2018-08-12 RX ADMIN — AMLODIPINE BESYLATE 5 MG: 10 TABLET ORAL at 05:44

## 2018-08-12 RX ADMIN — AMPICILLIN AND SULBACTAM 3 G: 2; 1 INJECTION, POWDER, FOR SOLUTION INTRAVENOUS at 00:24

## 2018-08-12 RX ADMIN — HEPARIN SODIUM 5000 UNITS: 5000 INJECTION, SOLUTION INTRAVENOUS; SUBCUTANEOUS at 21:33

## 2018-08-12 RX ADMIN — AMPICILLIN AND SULBACTAM 3 G: 2; 1 INJECTION, POWDER, FOR SOLUTION INTRAVENOUS at 06:22

## 2018-08-12 RX ADMIN — AMPICILLIN AND SULBACTAM 3 G: 2; 1 INJECTION, POWDER, FOR SOLUTION INTRAVENOUS at 13:39

## 2018-08-12 RX ADMIN — LINEZOLID 600 MG: 600 TABLET, FILM COATED ORAL at 05:45

## 2018-08-12 RX ADMIN — ATORVASTATIN CALCIUM 20 MG: 20 TABLET, FILM COATED ORAL at 05:44

## 2018-08-12 RX ADMIN — Medication 2 TABLET: at 05:46

## 2018-08-12 ASSESSMENT — ENCOUNTER SYMPTOMS
CHILLS: 0
NAUSEA: 0
COUGH: 0
BACK PAIN: 0
HEARTBURN: 0
ABDOMINAL PAIN: 0
FALLS: 0
DOUBLE VISION: 0
DIZZINESS: 0
DEPRESSION: 1
FEVER: 0
SORE THROAT: 0
HEADACHES: 0
SPUTUM PRODUCTION: 0
PALPITATIONS: 0
BLURRED VISION: 0
SHORTNESS OF BREATH: 0
LOSS OF CONSCIOUSNESS: 0

## 2018-08-12 ASSESSMENT — PAIN SCALES - GENERAL
PAINLEVEL_OUTOF10: 1
PAINLEVEL_OUTOF10: 0

## 2018-08-12 NOTE — PROGRESS NOTES
Infectious Disease Progress Note    Author: Marilee Bass M.D. Date & Time of service: 2018  12:23 PM    Chief Complaint:  Follow-up for right diabetic foot infection    Interval History:   AF WBC 9.7 patient denies any complaints however is a poor historian given paranoid schizophrenia, patient threatened to leave AMA   AF no CBC no new issues to report the patient is a poor historian, sitter at bedside as patient stated he wanted to kill himself yesterday, plan for IND with possible toe amputation today  Labs Reviewed, Medications Reviewed, Radiology Reviewed and Wound Reviewed.    Review of Systems:  Review of Systems   Unable to perform ROS: Psychiatric disorder       Hemodynamics:  Temp (24hrs), Av.6 °C (97.8 °F), Min:36.3 °C (97.4 °F), Max:36.7 °C (98 °F)  Temperature: 36.6 °C (97.8 °F)  Pulse  Av.4  Min: 60  Max: 122   Blood Pressure: 136/81       Physical Exam:  Physical Exam   Constitutional: He appears well-developed.   Disheveled   HENT:   Head: Normocephalic and atraumatic.   Eyes: Pupils are equal, round, and reactive to light. EOM are normal.   Poor eye contact   Neck: Neck supple.   Cardiovascular: Normal rate.    Irregular   Pulmonary/Chest: Effort normal and breath sounds normal.   Abdominal: Soft. There is no tenderness.   Musculoskeletal:   Right foot dressed in Kerlix   Neurological: He is alert.   Psychiatric:   Flat and odd affect       Meds:    Current Facility-Administered Medications:   •  amLODIPine  •  linezolid  •  lisinopril  •  atorvastatin  •  senna-docusate **AND** polyethylene glycol/lytes **AND** magnesium hydroxide **AND** bisacodyl  •  NS  •  heparin  •  acetaminophen  •  ampicillin-sulbactam (UNASYN) IV  •  insulin regular **AND** Accu-Chek ACHS **AND** NOTIFY MD and PharmD **AND** glucose 4 g **AND** dextrose 50%  •  ondansetron  •  ondansetron  •  promethazine  •  promethazine  •  prochlorperazine  •  insulin glargine  •  labetalol    Labs:  Recent  Labs      08/09/18   1854  08/10/18   0936  08/11/18   0418   WBC  17.5*  13.5*  9.7   RBC  4.71  4.45*  4.38*   HEMOGLOBIN  13.2*  12.4*  12.1*   HEMATOCRIT  38.8*  36.1*  36.7*   MCV  82.4  81.1*  83.8   MCH  28.0  27.9  27.6   RDW  40.1  39.0  40.4   PLATELETCT  341  297  330   MPV  9.7  9.9  9.6   NEUTSPOLYS  84.50*  79.10*   --    LYMPHOCYTES  6.40*  9.90*   --    MONOCYTES  7.10  7.80   --    EOSINOPHILS  0.50  1.90   --    BASOPHILS  0.60  0.60   --      Recent Labs      08/10/18   0936  08/11/18   0418  08/12/18   0407   SODIUM  135  140  141   POTASSIUM  3.5*  3.6  3.6   CHLORIDE  103  109  111   CO2  22  23  21   GLUCOSE  275*  171*  141*   BUN  14  15  14     Recent Labs      08/10/18   0936  08/11/18   0418  08/12/18   0407   CREATININE  0.87  0.88  0.80       Imaging:  Dx-foot-complete 3+ Right    Result Date: 8/9/2018 8/9/2018 6:43 PM HISTORY/REASON FOR EXAM:  Right foot pain and swelling. Multiple open wounds. TECHNIQUE/EXAM DESCRIPTION AND NUMBER OF VIEWS: 3 views of the RIGHT foot. COMPARISON:  None. FINDINGS: No acute fracture or dislocation is present. There is a moderate hallux valgus deformity present. There is soft tissue swelling surrounding the 4th and 5th toes. There is irregularity of the 5th MTP joint and the 5th IP joint without evidence of acute erosion. This may represent prior injury or inflammation. No lytic or blastic bony defect is identified to suggest acute osteomyelitis.     1.  No acute right foot fracture or dislocation. 2.  Soft tissue swelling surrounding the 4th and 5th toes. 3.  No plain film evidence of acute osteomyelitis. 4.  Degenerative or post inflammatory irregularity of the right 5th MTP joint and IP joint. 5.  Moderate hallux valgus deformity.      Micro:  Results     Procedure Component Value Units Date/Time    CULTURE WOUND W/O GRAM STAIN [[id number]]  (Abnormal) Collected:  08/09/18 2108    Order Status:  Completed Specimen:  Wound from Right Foot Updated:   08/11/18 1044     Significant Indicator POS (POS)     Source WND     Site RIGHT FOOT     Culture Result Wound Light growth mixed skin phoebe. (A)      Streptococcus agalactiae (Group B)  Moderate growth   (A)    Culture Respiratory W/ GRM STN [[id number]]     Order Status:  Completed Specimen:  Respirate from Sputum           Assessment:  Active Hospital Problems    Diagnosis   • *Osteomyelitis due to type 2 diabetes mellitus (HCC) [E11.69, M86.9]   • Uncontrolled diabetes mellitus (HCC) [E11.65]   • Hypertension [I10]   • Hyponatremia [E87.1]   • Hyperlipidemia [E78.5]       Plan:  Right diabetic foot infection-additional workup  No fevers  Leukocytosis resolved  X-ray negative  ESR 93  MRI pending  Wound culture+ group B streptococcus  Continue Unasyn  Wound care  LPS following  Plan for I&D with possible toe amputation today  Duration of antibiotics per extent of surgery and operative findings    DM 2, poorly controlled  Hemoglobin A1c 12   Maintain blood sugars less than 150 to control infection and promote wound healing    Paranoid schizophrenia  Sitter at bedside  May be an impediment to his care and treatment    Discussed with internal medicine/Dr Ovalle.

## 2018-08-12 NOTE — PSYCHIATRY
"PSYCHIATRIC CONSULTATION:  Reason for admission: Osteomyelitis  Reason for consult: SI, threatening to leave AMA  Requesting Physician: Dr. Milagros Ovalle  Supervising Physician: Dr. Raquel Morgan    Legal status: On legal 2000    Chief Complaint: \"I'm a little upset about my toes\"    HPI:     Mr. Louis Orourke is a 62 year old  male with history of DMII who is admitted for osteomyelitis of his toe. Today he wanted to leave AMA and when asked what would happen if his infection spread, he said, \"I will probably kill myself,\" as well as \"I have been thinking about it\" when asked if he had a plan. Confirmed to MD that he had lots of guns; after this MD told him he would not be able to leave and he would be placed on a legal hold. He then claimed that he was joking and that he didn't have any guns at home. He also said, \"I probably don't have the guts to do it anyway.\"    On interview, I asked Mr. Orourke if he is still feeling suicidal. He told me, \"I was just joking. I was bullshitting around, I would never do that.\" He then sighed and said, \"I guess I don't want my toes cut off.\" When asked if he knew what could happen if he didn't go through with the procedure, however, he said, \"Yeah, the infection could spread to my leg, and then I'd be disabled.\" When asked what could happen in the worst case scenario, he says, \"I could lose my life.\" He says that his mood has been \"Fine, I guess,\" but that he was just \"a little upset about my toes.\"    I also asked him if he had a history of paranoid schizophrenia; he denies this. I then asked him why he told ID that he did; he says, \"I was just bored. There's nothing to do but sit here.\" He then talked about how he hadn't had anything to eat all day, and that he wasn't feeling well because of it. When asked if he would feel better talking about his toe situation to family or friends, he says, \"I keep it a secret most of the time.\"    Psychiatric Review of " "Systems:  Depression:  Denies SI, anhedonia, insomnia, etc.  Kimmie: Denies  Anxiety/Panic Attacks  Denies  PTSD symptom: Denies  Psychosis: Denies    Medical Review of Systems: All systems reviewed. Only those found to be + are noted below. All others are negative.   Neurological:    TBIs: Denies   SZs:Denies   Strokes:Denies    Psychiatric Examination:   Vitals: Weight/BMI: Body mass index is 30.39 kg/m². Blood pressure 147/92, pulse 83, temperature 36.6 °C (97.9 °F), resp. rate 16, height 1.803 m (5' 10.98\"), weight 98.8 kg (217 lb 13 oz), SpO2 92 %.   Vitals:    08/10/18 1921 08/11/18 0320 08/11/18 0715 08/11/18 1541   BP: 140/79 145/94 145/75 147/92   Pulse: 92 87 86 83   Resp: 18 17 15 16   Temp: 36.8 °C (98.3 °F) 36.5 °C (97.7 °F) 36.9 °C (98.4 °F) 36.6 °C (97.9 °F)   SpO2: 96% 93% 97% 92%   Weight:       Height:         Appearance:  male appearing stated age dressed in hospital gown; right foot wrapped in gauze  Behavior: Withdrawn, apathetic, minimally engaged; some psychomotor retardation  Thought Content: No SI/HI, no AVH, no delusions  Thought Process: Linear and goal-directed  Speech: Normal rate and rhythm  Mood: \"I\"m all right I guess\"        Affect: Flat to dysthymic  Attention/Alertness: Attends well to interview  Orientation/Memory: Grossly oriented  Insight/Judgement: Fair/limited    Past Psychiatric Hx:   Denies    Family Psychiatric Hx:  Denies    Social Hx:  Social History   Substance Use Topics   • Smoking status: Never Smoker   • Smokeless tobacco: Never Used   • Alcohol use Yes      Comment: once in a while. A couple beers per week.       Drug/Alcohol/Tobacco Hx:   Drugs: Used to use marijuana which made him \"paranoid\" - denies any paranoia now   Alcohol: Drinks a beer every other day   Tobacco: None    Grew up in [place] and has three sisters and a brother; graduated high school, worked at [employer] for 32 years, is retired. Never , no kids; lives with five cats at home in " [place].    Medical Hx:   Past Medical History:   Diagnosis Date   • Hyperlipidemia    • Hypertension    • Type II or unspecified type diabetes mellitus without mention of complication, not stated as uncontrolled        Medications:  No current facility-administered medications on file prior to encounter.      Current Outpatient Prescriptions on File Prior to Encounter   Medication Sig Dispense Refill   • Cholecalciferol 4000 units Cap Take 1 Capsule by mouth every day. 30 Cap 5   • insulin glargine (LANTUS) 100 UNIT/ML Solution Pen-injector injection Inject 30 Units as instructed every evening. 15 mL 5   • lisinopril (PRINIVIL, ZESTRIL) 40 MG tablet Take 1 Tab by mouth every day. 30 Tab 5   • metFORMIN (GLUCOPHAGE) 850 MG Tab Take 1 Tab by mouth 3 times a day, with meals. 90 Tab 5   • atorvastatin (LIPITOR) 20 MG Tab Take 1 Tab by mouth every day. 30 Tab 5   • loratadine (CLARITIN) 10 MG Tab Take 1 Tab by mouth every day. 30 Tab 2     Labs:  Lab Results   Component Value Date/Time    WBC 9.7 08/11/2018 04:18 AM    RBC 4.38 (L) 08/11/2018 04:18 AM    HEMOGLOBIN 12.1 (L) 08/11/2018 04:18 AM    HEMATOCRIT 36.7 (L) 08/11/2018 04:18 AM    MCV 83.8 08/11/2018 04:18 AM    MCH 27.6 08/11/2018 04:18 AM    MCHC 33.0 (L) 08/11/2018 04:18 AM    MPV 9.6 08/11/2018 04:18 AM    NEUTSPOLYS 79.10 (H) 08/10/2018 09:36 AM    LYMPHOCYTES 9.90 (L) 08/10/2018 09:36 AM    MONOCYTES 7.80 08/10/2018 09:36 AM    EOSINOPHILS 1.90 08/10/2018 09:36 AM    BASOPHILS 0.60 08/10/2018 09:36 AM      Lab Results   Component Value Date/Time    SODIUM 140 08/11/2018 04:18 AM    POTASSIUM 3.6 08/11/2018 04:18 AM    CHLORIDE 109 08/11/2018 04:18 AM    CO2 23 08/11/2018 04:18 AM    GLUCOSE 171 (H) 08/11/2018 04:18 AM    BUN 15 08/11/2018 04:18 AM    CREATININE 0.88 08/11/2018 04:18 AM    CREATININE 1.4 06/03/2006 07:22 PM      Lab Results   Component Value Date/Time    ALTSGPT 31 05/03/2018 01:07 PM    ASTSGOT 21 05/03/2018 01:07 PM    ALKPHOSPHAT 94  "05/03/2018 01:07 PM    TBILIRUBIN 0.5 05/03/2018 01:07 PM    ALBUMIN 4.2 05/03/2018 01:07 PM    GLOBULIN 3.2 05/03/2018 01:07 PM     No results found for: PROTHROMBTM, INR     ASSESSMENT:   1. Suicidal ideation  2. Rule out depressive disorder    This is a 62 year old  male with no known previous psychiatric history (there is no record of paranoid schizophrenia prior to this admission) who threatened SI while wanting to leave AMA and claimed to have guns at home; this was quickly retracted when he realized that he'd gotten himself on a legal hold. He continues to deny SI or that he has schizophrenia. He admits that he was very upset about potentially losing his toe and also that not eating may have contributed to his state of mind. On the other hand, it is very unusual to \"bullshit\" multiple providers about his mental health for no reason other than \"being bored.\" As an older white male who is not , in poor physical health, and presumably has guns at home, he is in a high risk demographic for suicide. Hopefully he will be in a more reasonable mood after he has eaten and have a sonya discussion with providers about undergoing surgical intervention for his infection.    PLAN:  Legal status: continue for now, will re-assess      Psychiatry will follow.  Thank you for the consult.     "

## 2018-08-12 NOTE — PROGRESS NOTES
"LIMB PRESERVATION SERVICE NOTE:    Subjective:      Reason for Consultation: Right Foot - 4th Digit. Right Foot 4th MTH Callus. Left Foot 5th MTH Callus    History of Present Illness:   Patient is well known to LPS and outpatient wound care services.     Patient is a 62 y.o. male with a past medical history that includes type 2 diabetes, HLD, and HTN and is admitted to [facility] for diabetic foot infection for which an LPS consult has been requested for.  Pt states wound started a couple of days ago to his Right foot 4th digit but also states he has been told that he should have had an aputation in the past and it healed.  Patient was diagnosed with diabetes approximately 10 years ago per the pt, but the pt is a poor historian and is vague with history of wound as well. Pt currently manages with insulin.  Patient checks blood sugars infrequently          Pt adamantly refuses amputation or Irrigation or any surgery whatsoever as a possible treatment option despite repeated education on infection and risks of osteomyelitis, sepsis, and/or death.    8/12/18 - Pt on SI hold - Pt agreeing to I and D still refusing amputation at this point.    Lab Results   Component Value Date/Time    HBA1C 12.0 (H) 08/10/2018 07:54 AM          Patient denies fevers chills, nausea, vomiting.     Pain:        Patient resting comfortably    Vitals  /81   Pulse 60   Temp 36.6 °C (97.8 °F)   Resp 18   Ht 1.803 m (5' 10.98\")   Wt 98.8 kg (217 lb 13 oz)   SpO2 93%   BMI 30.39 kg/m²       Objective:         PHYSICAL EXAMINATION:      General Appearance:  Well developed,  nourished, in no acute distress     Sensory Assessment        Patient sensation insensate bilaterally with light touch 10 of 10 points        Vascular Assessment        +2 DP,  +2 PT bilaterally     Orthotic, protective, supportive devices:      Offloading  Pend Sx Finalized outcome     Wound Characteristics                                                    Location: " Right Foot - 4th Digit    Initial Evaluation  Date:8/11/2018   Tissue Type and %: 100% Purple   Periwound: Red   Drainage: Moderate Purulant   Exposed structures AMY   Wound Edges:   Open   Odor: Malodorous   S&S of Infection:   Edema/Erythema   Edema: Localized at Site   Sensation: Insensate               Measurements: Initial Evaluation  Date:8/11/2018   Length (cm) 80%   Width (cm)     Depth (cm)     Tract/undermine        Location: Right Foot - 4th MTH Plantar Ulcer    Initial Evaluation  Date:8/11/2018   Tissue Type and %: 100% Black   Periwound: Callus   Drainage: Moderate Purulant   Exposed structures AMY   Wound Edges:   Closed   Odor: Malodorous   S&S of Infection:   Edema/Erythema   Edema: Localized at Site   Sensation: Insensate               Measurements: Initial Evaluation  Date:8/11/2018   Length (cm) 3   Width (cm) 3   Depth (cm)     Tract/undermine        Location: Left Foot - 4th MTH Plantar Ulcer    Initial Evaluation  Date:8/11/2018   Tissue Type and %: 100% Black   Periwound: Callus   Drainage: None   Exposed structures AMY   Wound Edges:   Closed   Odor: Malodorous   S&S of Infection:   Edema/Erythema   Edema: Localized at Site   Sensation: Insensate               Measurements: Initial Evaluation  Date:8/11/2018   Length (cm) 2   Width (cm) 2   Depth (cm)     Tract/undermine                    Tests and Measures:    Labs  Recent Labs      08/09/18   1854  08/10/18   0936  08/11/18 0418   WBC  17.5*  13.5*  9.7   RBC  4.71  4.45*  4.38*   HEMOGLOBIN  13.2*  12.4*  12.1*   HEMATOCRIT  38.8*  36.1*  36.7*   MCV  82.4  81.1*  83.8   MCH  28.0  27.9  27.6   MCHC  34.0  34.3  33.0*   RDW  40.1  39.0  40.4   PLATELETCT  341  297  330   MPV  9.7  9.9  9.6     Recent Labs      08/10/18   0936  08/11/18   0418  08/12/18   0407   SODIUM  135  140  141   POTASSIUM  3.5*  3.6  3.6   CHLORIDE  103  109  111   CO2  22  23  21   GLUCOSE  275*  171*  141*   BUN  14  15  14       Plan:       Treatment Plan and  Recommendations:    1. Labs\Imaging: Reviewed    2. Treatment:   Pt NPO I and D possible Toe Amp with Dr Krueger at 1700 hrs     Limb preservation status guarded, Concern for underlying Osteomyelitis, if pt does not agree to amputation wound healing will be problematic despite I and D.     Cont Wound Care by Nursing, LPS to Follow.                           Nursing to change every 48 Hours and PRN for Saturation or Dislodgement                Antibiotics per ID     HWB RLE         3.Collaboration:                                                 Diabetes Educator Involved: A1C is 12.0% Pt educated on keeping BS less than 150 to control infection and promote wound healing                                        Anticipated discharge plans (X):              SNF:                       Home Care:                       Outpatient Wound Center:     X              Self Care:                        Other:                       TBD: X     Professional Collaboration: D/W:  Dr Krueger, Bedside RN

## 2018-08-12 NOTE — CARE PLAN
Problem: Safety  Goal: Will remain free from injury  Outcome: PROGRESSING AS EXPECTED  1:1 safety sitter at bedside at all times.    Problem: Infection  Goal: Will remain free from infection  Outcome: PROGRESSING AS EXPECTED  Pt receiving IV abx.

## 2018-08-12 NOTE — PROGRESS NOTES
[facility] Medicine Daily Progress Note    Date of Service  8/12/18    Chief Complaint  62 y.o. male admitted 8/9/2018 with diabetic foot infection.    Interval Problem Update  Placed on legal hold yesterday after he tried to leave AMA and threatened to kill himself. Seen by psych, cont legal hold.  Wound Cx + Group b strep. zyvox discontinued, cont unasyn  Will d/w ortho about options other than amputation if feasible    Consultants/Specialty  Infectious disease  Orthopedics  Psychiatry    Disposition  Inpatient     Review of Systems  Review of Systems   Constitutional: Negative for chills and fever.   HENT: Negative for congestion, hearing loss and sore throat.    Eyes: Negative for blurred vision and double vision.   Respiratory: Negative for cough, sputum production and shortness of breath.    Cardiovascular: Negative for chest pain and palpitations.   Gastrointestinal: Negative for abdominal pain, heartburn and nausea.   Genitourinary: Negative for dysuria and urgency.   Musculoskeletal: Negative for back pain and falls.   Skin: Negative for itching and rash.        Wound on sole of right foot   Neurological: Negative for dizziness, loss of consciousness and headaches.   Psychiatric/Behavioral: Positive for depression and suicidal ideas.        Physical Exam  Blood Pressure: 141/87   Temperature: 36.8 °C (98.3 °F)   Pulse: 94   Respiration: 18   Pulse Oximetry: 94 %     Physical Exam   Constitutional: He is oriented to person, place, and time. He appears well-developed and well-nourished.   HENT:   Head: Normocephalic and atraumatic.   Eyes: Pupils are equal, round, and reactive to light.   Cardiovascular: Normal rate and regular rhythm.    Good LLE distal pulse. Unable to palpate RLE dorsalis pedis due to edema   Pulmonary/Chest: Breath sounds normal. No respiratory distress.   Abdominal: Soft. Bowel sounds are normal. He exhibits no distension. There is no tenderness.   Musculoskeletal:   Minimal RLE pedal  edema   Neurological: He is alert and oriented to person, place, and time.   Skin:   Wound over sole of base of right 4th toe. No expressed purulent drainage. About 3.5 cm. Surround erythema extending from dorsum of foot to medial aspect of shin. Bluish discoloration of 4th digit.   Psychiatric:   Flat affect, avoiding eye contact       Fluids    Intake/Output Summary (Last 24 hours) at 08/13/18 1045  Last data filed at 08/13/18 0850   Gross per 24 hour   Intake             1190 ml   Output              260 ml   Net              930 ml       Laboratory  Recent Labs      08/11/18   0418   WBC  9.7   RBC  4.38*   HEMOGLOBIN  12.1*   HEMATOCRIT  36.7*   MCV  83.8   MCH  27.6   MCHC  33.0*   RDW  40.4   PLATELETCT  330   MPV  9.6     Recent Labs      08/11/18   0418  08/12/18   0407  08/13/18   0350   SODIUM  140  141  137   POTASSIUM  3.6  3.6  3.8   CHLORIDE  109  111  108   CO2  23  21  19*   GLUCOSE  171*  141*  132*   BUN  15  14  15   CREATININE  0.88  0.80  0.87   CALCIUM  8.9  8.9  8.8                   Imaging  DX-FOOT-COMPLETE 3+ RIGHT   Final Result      1.  No acute right foot fracture or dislocation.      2.  Soft tissue swelling surrounding the 4th and 5th toes.      3.  No plain film evidence of acute osteomyelitis.      4.  Degenerative or post inflammatory irregularity of the right 5th MTP joint and IP joint.      5.  Moderate hallux valgus deformity.      MR-FOOT-WITH & W/O RIGHT    (Results Pending)        Assessment/Plan  * Osteomyelitis due to type 2 diabetes mellitus (HCC)   Assessment & Plan    - Wound Cx + group b strep; discontinue zyvox and cont unasyn  - ID and ortho consulted, recs appreciated  -  limb preservation evaluation completed  - MRI of right foot showing 4th and 5th toe osteomyelitis, septic arthritis, and cellulitis  - pt resistant to amputation. Have discussed with patient extensively about risks and benefits. Pending ortho eval and possible options other than amputation         "  Uncontrolled diabetes mellitus (HCC)   Assessment & Plan    - cont lantus 30U qPM, ISS, hypoglycemic protocol, diabetic diet  - A1c is 12  - diabetes education ordered        Hypertension- (present on admission)   Assessment & Plan    continue Prinivil and amlodipine          Suicidal ideation   Assessment & Plan    - pt placed on legal hold 8/11 when trying to leave AMA and his plan for worsening infection was to \"kill himself\" and saying he has plenty of guns at home  - psych following          Hyperlipidemia- (present on admission)   Assessment & Plan    continue home Lipitor.          "

## 2018-08-12 NOTE — CARE PLAN
Problem: Safety  Goal: Will remain free from injury  Outcome: PROGRESSING AS EXPECTED  Educated on fall precautions. Verbalized understanding. Bed low and locked. Call light within reach. Hourly rounding in place.     Problem: Knowledge Deficit  Goal: Knowledge of disease process/condition, treatment plan, diagnostic tests, and medications will improve  Outcome: PROGRESSING AS EXPECTED  Educated patient on medication. Patient verbalized understanding.

## 2018-08-12 NOTE — DISCHARGE PLANNING
Anticipated Discharge Disposition: To be determinate.    Action: LSW faxed Page 2 Certification of the  to Nicole, Legal Hold CCA.  LSW notified Nicole via e-mail of the incoming fax.    Barriers to Discharge: None.    Plan: Continue to monitor for discharge needs.

## 2018-08-12 NOTE — PROGRESS NOTES
Assessment complete.  AA&Ox4. RA. Denies SOB.  Denies any pain.  Dressing to RLE CDI.  Tolerating diabetic diet. Denies N/V.  + void. LBM PTA.   Pt up with SBA.  1:1 safety sitter at bedside.  All needs met at this time. Call light within reach. Pt calls appropriately.

## 2018-08-13 PROBLEM — R45.851 SUICIDAL IDEATION: Status: ACTIVE | Noted: 2018-08-13

## 2018-08-13 LAB
ANION GAP SERPL CALC-SCNC: 10 MMOL/L (ref 0–11.9)
BUN SERPL-MCNC: 15 MG/DL (ref 8–22)
CALCIUM SERPL-MCNC: 8.8 MG/DL (ref 8.5–10.5)
CHLORIDE SERPL-SCNC: 108 MMOL/L (ref 96–112)
CO2 SERPL-SCNC: 19 MMOL/L (ref 20–33)
CREAT SERPL-MCNC: 0.87 MG/DL (ref 0.5–1.4)
GLUCOSE BLD-MCNC: 122 MG/DL (ref 65–99)
GLUCOSE BLD-MCNC: 143 MG/DL (ref 65–99)
GLUCOSE BLD-MCNC: 178 MG/DL (ref 65–99)
GLUCOSE BLD-MCNC: 183 MG/DL (ref 65–99)
GLUCOSE BLD-MCNC: 223 MG/DL (ref 65–99)
GLUCOSE SERPL-MCNC: 132 MG/DL (ref 65–99)
GRAM STN SPEC: NORMAL
GRAM STN SPEC: NORMAL
POTASSIUM SERPL-SCNC: 3.8 MMOL/L (ref 3.6–5.5)
SIGNIFICANT IND 70042: NORMAL
SIGNIFICANT IND 70042: NORMAL
SITE SITE: NORMAL
SITE SITE: NORMAL
SODIUM SERPL-SCNC: 137 MMOL/L (ref 135–145)
SOURCE SOURCE: NORMAL
SOURCE SOURCE: NORMAL

## 2018-08-13 PROCEDURE — 700105 HCHG RX REV CODE 258: Performed by: HOSPITALIST

## 2018-08-13 PROCEDURE — 80048 BASIC METABOLIC PNL TOTAL CA: CPT

## 2018-08-13 PROCEDURE — A9270 NON-COVERED ITEM OR SERVICE: HCPCS | Performed by: HOSPITALIST

## 2018-08-13 PROCEDURE — 82962 GLUCOSE BLOOD TEST: CPT | Mod: 91

## 2018-08-13 PROCEDURE — 700105 HCHG RX REV CODE 258

## 2018-08-13 PROCEDURE — 700102 HCHG RX REV CODE 250 W/ 637 OVERRIDE(OP): Performed by: PSYCHIATRY & NEUROLOGY

## 2018-08-13 PROCEDURE — 36415 COLL VENOUS BLD VENIPUNCTURE: CPT

## 2018-08-13 PROCEDURE — 99233 SBSQ HOSP IP/OBS HIGH 50: CPT | Performed by: PSYCHIATRY & NEUROLOGY

## 2018-08-13 PROCEDURE — A9270 NON-COVERED ITEM OR SERVICE: HCPCS | Performed by: PSYCHIATRY & NEUROLOGY

## 2018-08-13 PROCEDURE — 700111 HCHG RX REV CODE 636 W/ 250 OVERRIDE (IP): Performed by: HOSPITALIST

## 2018-08-13 PROCEDURE — 770001 HCHG ROOM/CARE - MED/SURG/GYN PRIV*

## 2018-08-13 PROCEDURE — 700102 HCHG RX REV CODE 250 W/ 637 OVERRIDE(OP): Performed by: HOSPITALIST

## 2018-08-13 PROCEDURE — 99231 SBSQ HOSP IP/OBS SF/LOW 25: CPT | Performed by: HOSPITALIST

## 2018-08-13 PROCEDURE — 99232 SBSQ HOSP IP/OBS MODERATE 35: CPT | Performed by: INTERNAL MEDICINE

## 2018-08-13 RX ORDER — ARIPIPRAZOLE 10 MG/1
5 TABLET ORAL DAILY
Status: DISCONTINUED | OUTPATIENT
Start: 2018-08-13 | End: 2018-08-20

## 2018-08-13 RX ORDER — SODIUM CHLORIDE 9 MG/ML
INJECTION, SOLUTION INTRAVENOUS
Status: COMPLETED
Start: 2018-08-13 | End: 2018-08-13

## 2018-08-13 RX ORDER — AMOXICILLIN AND CLAVULANATE POTASSIUM 875; 125 MG/1; MG/1
1 TABLET, FILM COATED ORAL EVERY 12 HOURS
Status: DISCONTINUED | OUTPATIENT
Start: 2018-08-13 | End: 2018-08-13

## 2018-08-13 RX ADMIN — INSULIN HUMAN 3 UNITS: 100 INJECTION, SOLUTION PARENTERAL at 17:39

## 2018-08-13 RX ADMIN — ATORVASTATIN CALCIUM 20 MG: 20 TABLET, FILM COATED ORAL at 05:41

## 2018-08-13 RX ADMIN — AMPICILLIN AND SULBACTAM 3 G: 2; 1 INJECTION, POWDER, FOR SOLUTION INTRAVENOUS at 12:42

## 2018-08-13 RX ADMIN — HEPARIN SODIUM 5000 UNITS: 5000 INJECTION, SOLUTION INTRAVENOUS; SUBCUTANEOUS at 05:42

## 2018-08-13 RX ADMIN — SODIUM CHLORIDE 500 ML: 9 INJECTION, SOLUTION INTRAVENOUS at 17:38

## 2018-08-13 RX ADMIN — Medication 2 TABLET: at 09:46

## 2018-08-13 RX ADMIN — INSULIN GLARGINE 30 UNITS: 100 INJECTION, SOLUTION SUBCUTANEOUS at 17:39

## 2018-08-13 RX ADMIN — INSULIN HUMAN 2 UNITS: 100 INJECTION, SOLUTION PARENTERAL at 21:29

## 2018-08-13 RX ADMIN — LISINOPRIL 40 MG: 20 TABLET ORAL at 05:41

## 2018-08-13 RX ADMIN — HEPARIN SODIUM 5000 UNITS: 5000 INJECTION, SOLUTION INTRAVENOUS; SUBCUTANEOUS at 14:55

## 2018-08-13 RX ADMIN — AMPICILLIN AND SULBACTAM 3 G: 2; 1 INJECTION, POWDER, FOR SOLUTION INTRAVENOUS at 06:03

## 2018-08-13 RX ADMIN — AMPICILLIN AND SULBACTAM 3 G: 2; 1 INJECTION, POWDER, FOR SOLUTION INTRAVENOUS at 01:22

## 2018-08-13 RX ADMIN — AMPICILLIN AND SULBACTAM 3 G: 2; 1 INJECTION, POWDER, FOR SOLUTION INTRAVENOUS at 17:34

## 2018-08-13 RX ADMIN — HEPARIN SODIUM 5000 UNITS: 5000 INJECTION, SOLUTION INTRAVENOUS; SUBCUTANEOUS at 21:32

## 2018-08-13 RX ADMIN — ARIPIPRAZOLE 5 MG: 10 TABLET ORAL at 12:29

## 2018-08-13 RX ADMIN — Medication 2 TABLET: at 17:33

## 2018-08-13 RX ADMIN — AMLODIPINE BESYLATE 5 MG: 10 TABLET ORAL at 05:41

## 2018-08-13 RX ADMIN — INSULIN HUMAN 2 UNITS: 100 INJECTION, SOLUTION PARENTERAL at 12:44

## 2018-08-13 ASSESSMENT — ENCOUNTER SYMPTOMS
LOSS OF CONSCIOUSNESS: 0
NAUSEA: 0
FALLS: 0
SHORTNESS OF BREATH: 0
SORE THROAT: 0
BACK PAIN: 0
PALPITATIONS: 0
ABDOMINAL PAIN: 0
HEARTBURN: 0
SPUTUM PRODUCTION: 0
DIZZINESS: 0
FEVER: 0
DOUBLE VISION: 0
COUGH: 0
DEPRESSION: 0
HEADACHES: 0
CHILLS: 0
BLURRED VISION: 0

## 2018-08-13 ASSESSMENT — PAIN SCALES - GENERAL
PAINLEVEL_OUTOF10: 0
PAINLEVEL_OUTOF10: 1

## 2018-08-13 NOTE — ASSESSMENT & PLAN NOTE
"- with Unspecified Mood Disorder and Schizophrenia  - pt placed on legal hold 8/11 when trying to leave AMA and his plan for worsening infection was to \"kill himself\" and saying he has plenty of guns at home  - psych following; awaiting updated recommendations  - legal hold to be d/c'ed?  - cont Abilify  "

## 2018-08-13 NOTE — CARE PLAN
Problem: Communication  Goal: The ability to communicate needs accurately and effectively will improve  Outcome: PROGRESSING AS EXPECTED  Education provided on importance of using call light to alert staff of patient needs. Pt demonstrates understanding by using call light appropriately.    Problem: Safety  Goal: Will remain free from injury  Outcome: PROGRESSING AS EXPECTED  1:1 safety sitter at bedside at all times.

## 2018-08-13 NOTE — PROGRESS NOTES
[facility] Medicine Daily Progress Note    Date of Service  8/13/2018    Chief Complaint  62 y.o. male admitted 8/9/2018 with diabetic foot infection.    Interval Problem Update  Had right 4th toe amputation yesterday. Said it wasn't as bad as he thought it would be.   Mood appears better  No current complaints    Consultants/Specialty  Infectious disease  Orthopedics  Psychiatry    Disposition  Likely home, may need HH or SNF depending on needs from amputation. Also pending psych clearance    Review of Systems  Review of Systems   Constitutional: Negative for chills and fever.   HENT: Negative for congestion, hearing loss and sore throat.    Eyes: Negative for blurred vision and double vision.   Respiratory: Negative for cough, sputum production and shortness of breath.    Cardiovascular: Negative for chest pain and palpitations.   Gastrointestinal: Negative for abdominal pain, heartburn and nausea.   Genitourinary: Negative for dysuria and urgency.   Musculoskeletal: Negative for back pain and falls.   Skin: Negative for itching and rash.   Neurological: Negative for dizziness, loss of consciousness and headaches.   Psychiatric/Behavioral: Negative for depression and suicidal ideas.        Physical Exam  Blood Pressure: 141/87   Temperature: 36.8 °C (98.3 °F)   Pulse: 94   Respiration: 18   Pulse Oximetry: 94 %     Physical Exam   Constitutional: He is oriented to person, place, and time. He appears well-developed and well-nourished.   HENT:   Head: Normocephalic and atraumatic.   Eyes: Pupils are equal, round, and reactive to light.   Cardiovascular: Normal rate and regular rhythm.    Pulmonary/Chest: Breath sounds normal. No respiratory distress.   Abdominal: Soft. Bowel sounds are normal. He exhibits no distension. There is no tenderness.   Musculoskeletal:   Minimal RLE pedal edema   Neurological: He is alert and oriented to person, place, and time.   Skin:   Right 4th toe amputation, improved surrounding  cellulitis   Psychiatric:   Mood improved       Fluids    Intake/Output Summary (Last 24 hours) at 08/13/18 1214  Last data filed at 08/13/18 0850   Gross per 24 hour   Intake              930 ml   Output               10 ml   Net              920 ml       Laboratory  Recent Labs      08/11/18   0418   WBC  9.7   RBC  4.38*   HEMOGLOBIN  12.1*   HEMATOCRIT  36.7*   MCV  83.8   MCH  27.6   MCHC  33.0*   RDW  40.4   PLATELETCT  330   MPV  9.6     Recent Labs      08/11/18   0418  08/12/18   0407  08/13/18   0350   SODIUM  140  141  137   POTASSIUM  3.6  3.6  3.8   CHLORIDE  109  111  108   CO2  23  21  19*   GLUCOSE  171*  141*  132*   BUN  15  14  15   CREATININE  0.88  0.80  0.87   CALCIUM  8.9  8.9  8.8                   Imaging  DX-FOOT-COMPLETE 3+ RIGHT   Final Result      1.  No acute right foot fracture or dislocation.      2.  Soft tissue swelling surrounding the 4th and 5th toes.      3.  No plain film evidence of acute osteomyelitis.      4.  Degenerative or post inflammatory irregularity of the right 5th MTP joint and IP joint.      5.  Moderate hallux valgus deformity.      MR-FOOT-WITH & W/O RIGHT    (Results Pending)        Assessment/Plan  * Osteomyelitis due to type 2 diabetes mellitus (HCC)   Assessment & Plan    - Wound Cx + group b strep;  Cont unasyn  - ID and ortho consulted, recs appreciated  -  limb preservation evaluation completed  - MRI of right foot showing 4th and 5th toe osteomyelitis, septic arthritis, and cellulitis  - 8/12 right 4th toe amputation done  - will get PT/OT once cleared by ortho, don't anticipate significant needs          Uncontrolled diabetes mellitus (HCC)   Assessment & Plan    - cont lantus 30U qPM, ISS, hypoglycemic protocol, diabetic diet  - A1c is 12  - diabetes education        Hypertension- (present on admission)   Assessment & Plan    continue Prinivil and amlodipine          Suicidal ideation   Assessment & Plan    - pt placed on legal hold 8/11 when trying to  "leave AMA and his plan for worsening infection was to \"kill himself\" and saying he has plenty of guns at home  - psych following  - mood better today          Hyperlipidemia- (present on admission)   Assessment & Plan    continue home Lipitor.          "

## 2018-08-13 NOTE — PSYCHIATRY
"PSYCHIATRIC FOLLOW UP:    Reason for Admission: osteomyelitis   Legal hold status:   On hold   Psychiatric Supervising Attending:     Yasmine    HPI:       Pt is a 61 y/o man with DMII and osteomyelitis of toe, was seen over the weekend by psych resident when he wanted to leave AMA, was also talking about killing himself; stated had access to guns, was placed on a legal hold. He apparently told ID he had a history of paranoid schizophrenia, which he later denied stating he said that because he wsa \"bored\". He appears internally stimulated and paranoid during interview. Very odd affect.     Psychiatric Examination: observed phenomenon:  Vitals:   Vitals:    08/12/18 1915 08/12/18 2013 08/12/18 2320 08/13/18 0350   BP:  137/94 132/83 115/74   Pulse: 83 87 71 88   Resp: 19 16 16 18   Temp:  36.4 °C (97.5 °F) 36.5 °C (97.7 °F) 37.2 °C (99 °F)   SpO2: 100% 97% 95% 96%   Weight:       Height:           Musculoskeletal psychootor retardation   Appearance:Grooming wnl   Thoughts:concrete, appears paranoid   Speech:strange tone   Mood:    Appears depressed   Affect:    Guarded, internally stimulated   SI/HI:   Denies but he is not a reliable historian   Attention/Alertness:   Attention impaired   Memory:    wnl     Orientation:    Grossly intact.   Fund of Knowledge:    Grossly intact.   Cognition:impaired, likely from psychosis   Insight/Judgement into symptoms poor   Neurological Testing:    Medical systems reviewed:     Denies sob  Denies cp  deneis n/v  All other systems reviewed and are negative.     Lab results/tests:        Recent Results (from the past 48 hour(s))   ACCU-CHEK GLUCOSE    Collection Time: 08/11/18  6:39 PM   Result Value Ref Range    Glucose - Accu-Ck 247 (H) 65 - 99 mg/dL   ACCU-CHEK GLUCOSE    Collection Time: 08/11/18  9:34 PM   Result Value Ref Range    Glucose - Accu-Ck 189 (H) 65 - 99 mg/dL   BASIC METABOLIC PANEL    Collection Time: 08/12/18  4:07 AM   Result Value Ref Range    Sodium " 141 135 - 145 mmol/L    Potassium 3.6 3.6 - 5.5 mmol/L    Chloride 111 96 - 112 mmol/L    Co2 21 20 - 33 mmol/L    Glucose 141 (H) 65 - 99 mg/dL    Bun 14 8 - 22 mg/dL    Creatinine 0.80 0.50 - 1.40 mg/dL    Calcium 8.9 8.5 - 10.5 mg/dL    Anion Gap 9.0 0.0 - 11.9   ESTIMATED GFR    Collection Time: 08/12/18  4:07 AM   Result Value Ref Range    GFR If African American >60 >60 mL/min/1.73 m 2    GFR If Non African American >60 >60 mL/min/1.73 m 2   ACCU-CHEK GLUCOSE    Collection Time: 08/12/18  6:25 AM   Result Value Ref Range    Glucose - Accu-Ck 133 (H) 65 - 99 mg/dL   ACCU-CHEK GLUCOSE    Collection Time: 08/12/18 11:44 AM   Result Value Ref Range    Glucose - Accu-Ck 162 (H) 65 - 99 mg/dL   ACCU-CHEK GLUCOSE    Collection Time: 08/12/18  9:31 PM   Result Value Ref Range    Glucose - Accu-Ck 132 (H) 65 - 99 mg/dL   BASIC METABOLIC PANEL    Collection Time: 08/13/18  3:50 AM   Result Value Ref Range    Sodium 137 135 - 145 mmol/L    Potassium 3.8 3.6 - 5.5 mmol/L    Chloride 108 96 - 112 mmol/L    Co2 19 (L) 20 - 33 mmol/L    Glucose 132 (H) 65 - 99 mg/dL    Bun 15 8 - 22 mg/dL    Creatinine 0.87 0.50 - 1.40 mg/dL    Calcium 8.8 8.5 - 10.5 mg/dL    Anion Gap 10.0 0.0 - 11.9   ESTIMATED GFR    Collection Time: 08/13/18  3:50 AM   Result Value Ref Range    GFR If African American >60 >60 mL/min/1.73 m 2    GFR If Non African American >60 >60 mL/min/1.73 m 2         Assessment:  Paranoid schizophrenia   Uncontrolled DM   Osteomyelitis due to type 2 DM            Plan:  legal hold:  Extended    Starting abilify for psychosis   Will follow

## 2018-08-13 NOTE — PROGRESS NOTES
Assessment complete.  AA&Ox4.  Reporting 1/10 pain. No intervention needed.  Dressing to RLE CDI.  Pt placed back on diabetic diet. Denies N/V.  + void. LBM PTA 8/9/18.   1:1 safety sitter at bedside.  All needs met at this time. Call light within reach. Pt calls appropriately

## 2018-08-13 NOTE — PROGRESS NOTES
61 yo man with DM  Necrotic 4th toe  Agrees to 4th toe amputation  Will also plan to debride callus/ulcer, discussed with patient  Right foot marked  Consent signed

## 2018-08-14 LAB
ANION GAP SERPL CALC-SCNC: 7 MMOL/L (ref 0–11.9)
BACTERIA TISS AEROBE CULT: ABNORMAL
BACTERIA WND AEROBE CULT: ABNORMAL
BASOPHILS # BLD AUTO: 0.7 % (ref 0–1.8)
BASOPHILS # BLD: 0.06 K/UL (ref 0–0.12)
BUN SERPL-MCNC: 13 MG/DL (ref 8–22)
CALCIUM SERPL-MCNC: 8.7 MG/DL (ref 8.5–10.5)
CHLORIDE SERPL-SCNC: 111 MMOL/L (ref 96–112)
CO2 SERPL-SCNC: 21 MMOL/L (ref 20–33)
CREAT SERPL-MCNC: 0.77 MG/DL (ref 0.5–1.4)
EOSINOPHIL # BLD AUTO: 0.51 K/UL (ref 0–0.51)
EOSINOPHIL NFR BLD: 6.2 % (ref 0–6.9)
ERYTHROCYTE [DISTWIDTH] IN BLOOD BY AUTOMATED COUNT: 39.5 FL (ref 35.9–50)
GLUCOSE BLD-MCNC: 119 MG/DL (ref 65–99)
GLUCOSE BLD-MCNC: 166 MG/DL (ref 65–99)
GLUCOSE BLD-MCNC: 214 MG/DL (ref 65–99)
GLUCOSE BLD-MCNC: 253 MG/DL (ref 65–99)
GLUCOSE SERPL-MCNC: 129 MG/DL (ref 65–99)
GRAM STN SPEC: ABNORMAL
GRAM STN SPEC: ABNORMAL
HCT VFR BLD AUTO: 37.5 % (ref 42–52)
HGB BLD-MCNC: 12.6 G/DL (ref 14–18)
IMM GRANULOCYTES # BLD AUTO: 0.09 K/UL (ref 0–0.11)
IMM GRANULOCYTES NFR BLD AUTO: 1.1 % (ref 0–0.9)
LYMPHOCYTES # BLD AUTO: 1.51 K/UL (ref 1–4.8)
LYMPHOCYTES NFR BLD: 18.3 % (ref 22–41)
MAGNESIUM SERPL-MCNC: 1.8 MG/DL (ref 1.5–2.5)
MCH RBC QN AUTO: 28.1 PG (ref 27–33)
MCHC RBC AUTO-ENTMCNC: 33.6 G/DL (ref 33.7–35.3)
MCV RBC AUTO: 83.5 FL (ref 81.4–97.8)
MONOCYTES # BLD AUTO: 0.6 K/UL (ref 0–0.85)
MONOCYTES NFR BLD AUTO: 7.3 % (ref 0–13.4)
NEUTROPHILS # BLD AUTO: 5.47 K/UL (ref 1.82–7.42)
NEUTROPHILS NFR BLD: 66.4 % (ref 44–72)
NRBC # BLD AUTO: 0 K/UL
NRBC BLD-RTO: 0 /100 WBC
PLATELET # BLD AUTO: 369 K/UL (ref 164–446)
PMV BLD AUTO: 9.1 FL (ref 9–12.9)
POTASSIUM SERPL-SCNC: 3.5 MMOL/L (ref 3.6–5.5)
RBC # BLD AUTO: 4.49 M/UL (ref 4.7–6.1)
SIGNIFICANT IND 70042: ABNORMAL
SIGNIFICANT IND 70042: ABNORMAL
SITE SITE: ABNORMAL
SITE SITE: ABNORMAL
SODIUM SERPL-SCNC: 139 MMOL/L (ref 135–145)
SOURCE SOURCE: ABNORMAL
SOURCE SOURCE: ABNORMAL
WBC # BLD AUTO: 8.2 K/UL (ref 4.8–10.8)

## 2018-08-14 PROCEDURE — 700102 HCHG RX REV CODE 250 W/ 637 OVERRIDE(OP): Performed by: PSYCHIATRY & NEUROLOGY

## 2018-08-14 PROCEDURE — 80048 BASIC METABOLIC PNL TOTAL CA: CPT

## 2018-08-14 PROCEDURE — 700102 HCHG RX REV CODE 250 W/ 637 OVERRIDE(OP): Performed by: HOSPITALIST

## 2018-08-14 PROCEDURE — 700111 HCHG RX REV CODE 636 W/ 250 OVERRIDE (IP): Performed by: HOSPITALIST

## 2018-08-14 PROCEDURE — A9270 NON-COVERED ITEM OR SERVICE: HCPCS | Performed by: HOSPITALIST

## 2018-08-14 PROCEDURE — A9270 NON-COVERED ITEM OR SERVICE: HCPCS | Performed by: PSYCHIATRY & NEUROLOGY

## 2018-08-14 PROCEDURE — 700105 HCHG RX REV CODE 258: Performed by: HOSPITALIST

## 2018-08-14 PROCEDURE — 82962 GLUCOSE BLOOD TEST: CPT | Mod: 91

## 2018-08-14 PROCEDURE — 85025 COMPLETE CBC W/AUTO DIFF WBC: CPT

## 2018-08-14 PROCEDURE — 700102 HCHG RX REV CODE 250 W/ 637 OVERRIDE(OP): Performed by: INTERNAL MEDICINE

## 2018-08-14 PROCEDURE — 99232 SBSQ HOSP IP/OBS MODERATE 35: CPT | Performed by: INTERNAL MEDICINE

## 2018-08-14 PROCEDURE — 83735 ASSAY OF MAGNESIUM: CPT

## 2018-08-14 PROCEDURE — A9270 NON-COVERED ITEM OR SERVICE: HCPCS | Performed by: INTERNAL MEDICINE

## 2018-08-14 PROCEDURE — 36415 COLL VENOUS BLD VENIPUNCTURE: CPT

## 2018-08-14 PROCEDURE — 770001 HCHG ROOM/CARE - MED/SURG/GYN PRIV*

## 2018-08-14 RX ORDER — POTASSIUM CHLORIDE 20 MEQ/1
20 TABLET, EXTENDED RELEASE ORAL ONCE
Status: COMPLETED | OUTPATIENT
Start: 2018-08-14 | End: 2018-08-14

## 2018-08-14 RX ADMIN — AMPICILLIN AND SULBACTAM 3 G: 2; 1 INJECTION, POWDER, FOR SOLUTION INTRAVENOUS at 11:41

## 2018-08-14 RX ADMIN — INSULIN HUMAN 3 UNITS: 100 INJECTION, SOLUTION PARENTERAL at 18:02

## 2018-08-14 RX ADMIN — POTASSIUM CHLORIDE 20 MEQ: 1500 TABLET, EXTENDED RELEASE ORAL at 11:41

## 2018-08-14 RX ADMIN — Medication 2 TABLET: at 17:57

## 2018-08-14 RX ADMIN — INSULIN GLARGINE 30 UNITS: 100 INJECTION, SOLUTION SUBCUTANEOUS at 18:01

## 2018-08-14 RX ADMIN — LISINOPRIL 40 MG: 20 TABLET ORAL at 06:41

## 2018-08-14 RX ADMIN — AMPICILLIN AND SULBACTAM 3 G: 2; 1 INJECTION, POWDER, FOR SOLUTION INTRAVENOUS at 06:40

## 2018-08-14 RX ADMIN — INSULIN HUMAN 5 UNITS: 100 INJECTION, SOLUTION PARENTERAL at 20:41

## 2018-08-14 RX ADMIN — ATORVASTATIN CALCIUM 20 MG: 20 TABLET, FILM COATED ORAL at 06:40

## 2018-08-14 RX ADMIN — AMPICILLIN AND SULBACTAM 3 G: 2; 1 INJECTION, POWDER, FOR SOLUTION INTRAVENOUS at 00:20

## 2018-08-14 RX ADMIN — Medication 2 TABLET: at 06:41

## 2018-08-14 RX ADMIN — HEPARIN SODIUM 5000 UNITS: 5000 INJECTION, SOLUTION INTRAVENOUS; SUBCUTANEOUS at 15:09

## 2018-08-14 RX ADMIN — AMPICILLIN AND SULBACTAM 3 G: 2; 1 INJECTION, POWDER, FOR SOLUTION INTRAVENOUS at 18:15

## 2018-08-14 RX ADMIN — INSULIN HUMAN 2 UNITS: 100 INJECTION, SOLUTION PARENTERAL at 11:46

## 2018-08-14 RX ADMIN — AMLODIPINE BESYLATE 5 MG: 10 TABLET ORAL at 06:40

## 2018-08-14 RX ADMIN — AMPICILLIN AND SULBACTAM 3 G: 2; 1 INJECTION, POWDER, FOR SOLUTION INTRAVENOUS at 23:34

## 2018-08-14 RX ADMIN — ARIPIPRAZOLE 5 MG: 10 TABLET ORAL at 06:41

## 2018-08-14 RX ADMIN — HEPARIN SODIUM 5000 UNITS: 5000 INJECTION, SOLUTION INTRAVENOUS; SUBCUTANEOUS at 06:41

## 2018-08-14 ASSESSMENT — PAIN SCALES - GENERAL
PAINLEVEL_OUTOF10: 0

## 2018-08-14 ASSESSMENT — ENCOUNTER SYMPTOMS
CHILLS: 0
FEVER: 0
DEPRESSION: 0
SHORTNESS OF BREATH: 0
ABDOMINAL PAIN: 0
NAUSEA: 0

## 2018-08-14 NOTE — CARE PLAN
Problem: Safety  Goal: Will remain free from injury  1:1 sitter at bedside    Problem: Infection  Goal: Will remain free from infection    Intervention: Assess signs and symptoms of infection  Afebrile      Problem: Bowel/Gastric:  Goal: Will not experience complications related to bowel motility    Intervention: Assess baseline bowel pattern  Patient stated BM last night.

## 2018-08-14 NOTE — PROGRESS NOTES
Infectious Disease Progress Note    Author: Lianet Simpson M.D. Date & Time of service: 2018  4:46 PM    Chief Complaint:  Follow-up for right diabetic foot infection    Interval History:   AF WBC 9.7 patient denies any complaints however is a poor historian given paranoid schizophrenia, patient threatened to leave AMA   AF no CBC no new issues to report the patient is a poor historian, sitter at bedside as patient stated he wanted to kill himself yesterday, plan for IND with possible toe amputation today  2018-T-max 99.  No new issues overnight.  Underwent surgery this morning  2018-T-max 98.3.  No new issues overnight.  Labs Reviewed, Medications Reviewed, Radiology Reviewed and Wound Reviewed.    Review of Systems:  Review of Systems   Unable to perform ROS: Psychiatric disorder       Hemodynamics:  Temp (24hrs), Av.6 °C (97.8 °F), Min:36.3 °C (97.3 °F), Max:36.8 °C (98.3 °F)  Temperature: 36.3 °C (97.3 °F)  Pulse  Av.7  Min: 60  Max: 122   Blood Pressure: 137/88       Physical Exam:  Physical Exam   Constitutional: He appears well-developed.   Disheveled   HENT:   Head: Normocephalic and atraumatic.   Eyes: Pupils are equal, round, and reactive to light. EOM are normal.   Poor eye contact   Neck: Neck supple.   Cardiovascular: Normal rate.    Irregular   Pulmonary/Chest: Effort normal and breath sounds normal.   Abdominal: Soft. There is no tenderness.   Musculoskeletal:   Refer to the wound care pictures and note   Neurological: He is alert.   Psychiatric:   Flat and odd affect       Meds:    Current Facility-Administered Medications:   •  ampicillin-sulbactam (UNASYN) IV  •  ARIPiprazole  •  amLODIPine  •  lisinopril  •  atorvastatin  •  senna-docusate **AND** polyethylene glycol/lytes **AND** magnesium hydroxide **AND** bisacodyl  •  NS  •  heparin  •  acetaminophen  •  insulin regular **AND** Accu-Chek ACHS **AND** NOTIFY MD and PharmD **AND** glucose 4 g **AND** dextrose  50%  •  ondansetron  •  ondansetron  •  promethazine  •  promethazine  •  prochlorperazine  •  insulin glargine  •  labetalol    Labs:  Recent Labs      08/14/18   0505   WBC  8.2   RBC  4.49*   HEMOGLOBIN  12.6*   HEMATOCRIT  37.5*   MCV  83.5   MCH  28.1   RDW  39.5   PLATELETCT  369   MPV  9.1   NEUTSPOLYS  66.40   LYMPHOCYTES  18.30*   MONOCYTES  7.30   EOSINOPHILS  6.20   BASOPHILS  0.70     Recent Labs      08/12/18   0407  08/13/18   0350  08/14/18   0505   SODIUM  141  137  139   POTASSIUM  3.6  3.8  3.5*   CHLORIDE  111  108  111   CO2  21  19*  21   GLUCOSE  141*  132*  129*   BUN  14  15  13     Recent Labs      08/12/18   0407  08/13/18   0350  08/14/18   0505   CREATININE  0.80  0.87  0.77       Imaging:  Dx-foot-complete 3+ Right    Result Date: 8/9/2018 8/9/2018 6:43 PM HISTORY/REASON FOR EXAM:  Right foot pain and swelling. Multiple open wounds. TECHNIQUE/EXAM DESCRIPTION AND NUMBER OF VIEWS: 3 views of the RIGHT foot. COMPARISON:  None. FINDINGS: No acute fracture or dislocation is present. There is a moderate hallux valgus deformity present. There is soft tissue swelling surrounding the 4th and 5th toes. There is irregularity of the 5th MTP joint and the 5th IP joint without evidence of acute erosion. This may represent prior injury or inflammation. No lytic or blastic bony defect is identified to suggest acute osteomyelitis.     1.  No acute right foot fracture or dislocation. 2.  Soft tissue swelling surrounding the 4th and 5th toes. 3.  No plain film evidence of acute osteomyelitis. 4.  Degenerative or post inflammatory irregularity of the right 5th MTP joint and IP joint. 5.  Moderate hallux valgus deformity.      Micro:  Results     Procedure Component Value Units Date/Time    CULTURE TISSUE W/ GRM STAIN [[id number]]  (Abnormal) Collected:  08/12/18 1751    Order Status:  Completed Specimen:  Tissue Updated:  08/14/18 0926     Significant Indicator POS (POS)     Source TISS     Site Right 4th  Toe Tissue     Tissue Culture -- (A)     Gram Stain Result Many Gram positive cocci. (A)     Tissue Culture Streptococcus agalactiae (Group B)  Moderate growth   (A)      Viridans Streptococcus  Moderate growth   (A)    ANAEROBIC CULTURE [[id number]] Collected:  08/12/18 1751    Order Status:  Completed Specimen:  Tissue Updated:  08/14/18 0926     Significant Indicator NEG     Source TISS     Site Right 4th Toe Tissue     Anaerobic Culture, Culture Res Culture in progress.    CULTURE WOUND W/ GRAM STAIN [[id number]]  (Abnormal) Collected:  08/12/18 1749    Order Status:  Completed Specimen:  Wound Updated:  08/14/18 0923     Significant Indicator POS (POS)     Source WND     Site Right 4th Toe     Culture Result Wound -- (A)     Gram Stain Result Rare WBCs.  Few Gram positive cocci.       Culture Result Wound Streptococcus agalactiae (Group B)  Moderate growth   (A)      Viridans Streptococcus  Moderate growth   (A)    ANAEROBIC CULTURE [[id number]] Collected:  08/12/18 1749    Order Status:  Completed Specimen:  Wound Updated:  08/14/18 0923     Significant Indicator NEG     Source WND     Site Right 4th Toe     Anaerobic Culture, Culture Res Culture in progress.    GRAM STAIN [[id number]] Collected:  08/12/18 1751    Order Status:  Completed Specimen:  Tissue Updated:  08/13/18 0923     Significant Indicator .     Source TISS     Site Right 4th Toe Tissue     Gram Stain Result Many Gram positive cocci.    GRAM STAIN [[id number]] Collected:  08/12/18 1749    Order Status:  Completed Specimen:  Wound Updated:  08/13/18 0923     Significant Indicator .     Source WND     Site Right 4th Toe     Gram Stain Result Rare WBCs.  Few Gram positive cocci.      CULTURE WOUND W/O GRAM STAIN [[id number]]  (Abnormal) Collected:  08/09/18 2108    Order Status:  Completed Specimen:  Wound from Right Foot Updated:  08/11/18 1044     Significant Indicator POS (POS)     Source WND     Site RIGHT FOOT     Culture Result Wound Light growth  mixed skin phoebe. (A)      Streptococcus agalactiae (Group B)  Moderate growth   (A)    Culture Respiratory W/ GRM STN [[id number]] Collected:  08/09/18 0000    Order Status:  Canceled Specimen:  Sputum from Sputum           Assessment:  Active Hospital Problems    Diagnosis   • *Osteomyelitis due to type 2 diabetes mellitus (HCC) [E11.69, M86.9]   • Uncontrolled diabetes mellitus (HCC) [E11.65]   • Hypertension [I10]   • Hyponatremia [E87.1]   • Hyperlipidemia [E78.5]       Plan:  Right diabetic foot infection-additional workup  No fevers  Leukocytosis resolved  X-ray negative  ESR 93  MRI pending  Wound culture+ group B streptococcus strep viridans  Continue Unasyn  Wound care  LPS following  Underwent right fourth toe amputation and right foot I&D  In view of foot abscess he will need longer antibiotics-least 4 weeks  ?  SNIF placement    DM 2, poorly controlled  Hemoglobin A1c 12   Maintain blood sugars less than 150 to control infection and promote wound healing    Paranoid schizophrenia  Sitter at bedside  May be an impediment to his care and treatment    Discussed with internal medicine/

## 2018-08-14 NOTE — PROGRESS NOTES
Assumed care at 1900 received report from day shift RN.    A&Ox4. Flat affect.     denies Pain,   denies nausea,   tolerating diabetic diet,   +voiding, + BM  +bowel sounds +flatus    Patient is on a legal hold, 1:1 sitter.       ambulating with stand by assist and front wheel walker     Patient call light within reach, bed in the lowest position, hourly rounding in place.

## 2018-08-14 NOTE — PROGRESS NOTES
Patient AO, flat affect.  No active threats to self.  Dressing to LLE, surgical.  Some old drainage, awaiting dressing change orders.  Foot edematous, warm, elevated.  Small callous wounds to L foot: 1) on top of 5th toe and 2) plantar foot at base of 5th toe.  Both areas have hardened skin, no drainage, no redness.  Patient denies pain throughout day and was able to walk in hallway several times.  Education provided for placing weight in heel of R foot, patient demonstrated understanding with reminders, FWW used. Plan of care discussed, questions answered, verbalized understanding.

## 2018-08-14 NOTE — DISCHARGE PLANNING
Anticipated Discharge Disposition: Inpatient Mental Health Facility (on legal hold).     Action: Discussed pt's case in MDT rounds, pt remains on legal hold and is not medically cleared at this time. Called and updated legal hold coordinator Shaila.     Barriers to Discharge: N/A.     Plan: As above, pt remains on a legal hold, await medical clearance for MH referrals to be placed.

## 2018-08-14 NOTE — PROGRESS NOTES
Dressing changed to R foot toe amputation site.  Wound is well approxiamted with staples, minimal bloody drainage, xeroform used only over suture line.  This RN placed call to wound care to get in touch with [facility] for wound care instructions for 2 open areas on plantar aspect of foot.  Awaiting call back.  This Rn also notified Dr. Ovalle regarding + culture result from fourth toe tissue.

## 2018-08-14 NOTE — CARE PLAN
Problem: Bowel/Gastric:  Goal: Normal bowel function is maintained or improved  Outcome: PROGRESSING AS EXPECTED  Patient has hyperactive bowel sounds, + flatus, LBM 8/12/2018      Problem: Mobility  Goal: Risk for activity intolerance will decrease  Outcome: PROGRESSING AS EXPECTED  Patient able to mobilize  to bathroom with stand by assist.

## 2018-08-14 NOTE — PROGRESS NOTES
"LIMB PRESERVATION SERVICE     Date of Consultation: 8/14/2018     History:    62 y.o. male with a past medical history that includes type 2 diabetes, HLD, and HTN and is admitted to [facility] for diabetic foot infection, discoloration and swelling of 4th toe, ulcer to plantar 4th MTH.  Dr. Krueger consulted, dx 4th toe OM and R plantar lateral abscess.  8/14- POD # 2 s/p R 4th toe amp and R foot I&D    Review of Systems:   GENERAL:  he denies any fever, chills, weight loss or gain.   PULMONARY: he denies shortness of breath, no cough  GASTROINTESTINAL: he has no nausea, vomiting, diarrhea or constipation.   LOWER EXTREMITIES: he denies claudication.   NEUROLOGIC: Numbness in feet    Physical Exam:   VITAL SIGNS: Blood pressure 137/88, pulse 69, temperature 36.3 °C (97.3 °F), resp. rate 18, height 1.803 m (5' 10.98\"), weight 98.8 kg (217 lb 13 oz), SpO2 96 %.  GENERAL: Well developed, well nourished, in no acute distress. He is a bit fidgety, pushing feet into foot board of bed  LOWER EXTREMITIES: Toe amputation site incision well approximated, minimal oozing of ss drainage.  Open ulcers to lateral foot and mid/plantar foot.  See below.  Feet warm.  Pedal pulses palpable and strong  NEUROLOGIC: Flat affect         Wound Characteristics                                                    Location:  Right plantar lateral foot ulcer    Date: 8/14/18   Tissue Type and %: 100% red   Periwound: callus   Drainage: Minimal ss   Exposed structures none   Wound Edges:   attached   Odor: none   S&S of Infection:   none   Edema: Local   Sensation: impaired     Measurements (post-debridement)    Length (cm) 0.9   Width (cm) 0.8   Depth (cm) 0.3   Area (cm2) 0.72   Tract/undermine none             Location:  Right plantar/mid foot ulcer    Date: 8/14/18   Tissue Type and %: 100% red   Periwound: callus   Drainage: Minimal ss   Exposed structures none   Wound Edges:   attached   Odor: none   S&S of Infection:   none   Edema: Local "   Sensation: impaired     Measurements (post-debridement)    Length (cm) 0.7   Width (cm) 2.0   Depth (cm) 0.3   Area (cm2) 1.4   Tract/undermine none                   Procedures:     Debridement :  Scalpel and forceps used to debride wound beds and periwound callus. Entire surface of wound, ~2.5cm2, debrided, excising slough and senescent red tissue.  Tissue debrided into the subcutaneous layer.  Periwound callus, ~ 1cm2, debrided to skin level, excising hyperkeratinized tissue   Cleansed with:  Normal Saline                                                                        Periwound protected with: Skin prep   Primary dressing: Aquacel Ag   Secondary Dressing: foam dressing   Other:hypafix tape.  Kerlix and Ace wrap to foot and lower leg    Labs:   Recent Labs      08/14/18   0505   WBC  8.2   RBC  4.49*   HEMOGLOBIN  12.6*   HEMATOCRIT  37.5*   MCV  83.5   MCH  28.1   MCHC  33.6*   RDW  39.5   PLATELETCT  369   MPV  9.1     Recent Labs      08/12/18   0407  08/13/18   0350  08/14/18   0505   SODIUM  141  137  139   POTASSIUM  3.6  3.8  3.5*   CHLORIDE  111  108  111   CO2  21  19*  21   GLUCOSE  141*  132*  129*   BUN  14  15  13         Assessment/Plan:      1. Diabetic foot infection- OM and abscess.  S/p I&D and 4th toe amp.  Incision well approximated.  Ulcers x 2 to plantar foot, debrided at bedside.   Wound care orders written.  Boot ordered for offloading, but al;so to protect foot from pt pressing into foot board. Op cxs + for Strep.  ID managing abx    2. Uncontrolled type 2 diabetes- A1c 12. % this admission.  Diabetes education ordered

## 2018-08-14 NOTE — CONSULTS
DATE OF SERVICE:  08/12/2018    REQUESTING PHYSICIAN:  Dr. Ovalle.    CHIEF COMPLAINT:  Right foot infection.    HISTORY OF PRESENT ILLNESS:  The patient is a 62-year-old diabetic man who has   had a previous partial fifth toe amputation.  He has an infected right foot,   was admitted to the hospital 3 days ago and has been followed by limb   preservation service and surgery has been recommended, but the patient has   refused.  Today, he is more amenable to surgical intervention.  Orthopedic   consultation was requested.    ALLERGIES:  None.    MEDICATIONS:  Vitamin D, Lantus insulin, lisinopril, metformin, Lipitor,   Claritin.    PAST MEDICAL HISTORY:  Diabetes mellitus, hypertension, hyperlipidemia.    PAST SURGICAL HISTORY:  Right fifth toe amputation.    SOCIAL HISTORY:  Patient does not smoke.  Drinks alcohol occasionally.  Denies   any drug use.    FAMILY HISTORY:  Positive for stroke.  Heart disease and diabetes in mother   and lung disease in father.    REVIEW OF SYSTEMS:  No loss of conscious, nausea, vomiting, diarrhea,   constipation, polyuria, dysuria, fevers, chills, weight loss, weight gain,   abdominal pain, chest pain or shortness of breath.    PHYSICAL EXAMINATION:  GENERAL:  The patient is in no acute distress.  VITAL SIGNS:  His blood pressure is 151/100, heart rate 101, respirations 16,   temperature 97.2.  HEENT:  Normocephalic, atraumatic.  NECK:  Supple, nontender.  CHEST:  Nontender.  No labored breathing.  EXTREMITIES:  The upper extremity is without tenderness or deformity.  Left   lower extremity shows callus on the plantar lateral aspect of the foot over   the fifth MTP joint.  The right foot shows a necrotic fourth toe.  There is a   callus under the fourth metatarsal head as well.    LABORATORY DATA:  Include white blood count of 9700, hematocrit 36.7% and   platelet count 330,000.  Sodium 141, potassium is 3.6, creatinine 0.80.    C-reactive protein is elevated at 20.11.  Sed rate is  93.    Radiographs of the right foot show some soft tissue swelling on the fourth and   fifth toes.  No obvious bony erosions.  An MRI of the right foot from 08/10   has not been read, but from my review, a small abscess on the fifth metatarsal   head and osteomyelitis of the fourth toe with increased signal on all   phalanges of that toe.    ASSESSMENT:  1.  Right fourth toe osteomyelitis.  2.  Right plantar lateral foot abscess.  3.  Diabetes mellitus.    PLAN:  Recommended fourth toe amputation, I do not think debridement of the   toe is going to be successful, I think the toe is necrotic.  He agreed and   wished to proceed.  Also recommended trimming of the calluses on the foot.    Risks include bleeding, infection, ___ neurovascular injury, pain, stiffness,   wound healing problems, thromboembolic phenomena, anesthetic and medical   complications, etc.       ____________________________________     MD OK JEWELL / CASIMIRO    DD:  08/12/2018 19:46:48  DT:  08/13/2018 00:13:40    D#:  [id number]  Job#:  [id number]

## 2018-08-14 NOTE — PROGRESS NOTES
[facility] Hospitalist Progress Note    Date of Service: 2018    Chief Complaint  62 y.o. male admitted 2018 with DM, HTN, HLD who presented with right foot osteomyelitis s/p 4th toe amputation . With suicidal ideation.    Interval Problem Update  Patient upset about legal hold, says he didn't mean the suicidal ideation.  Wound growing GBS and strep viridans, ID following  Has no pain    Consultants/Specialty  ID  Psych  Ortho - [facility]    Disposition  Legal hold  Wound care        Review of Systems   Constitutional: Negative for chills and fever.   HENT: Negative for congestion.    Respiratory: Negative for shortness of breath.    Cardiovascular: Negative for chest pain.   Gastrointestinal: Negative for abdominal pain and nausea.   Musculoskeletal: Negative for joint pain.   Skin: Negative for itching.   Psychiatric/Behavioral: Negative for depression and suicidal ideas.      Physical Exam  Laboratory/Imaging   Hemodynamics  Temp (24hrs), Av.6 °C (97.8 °F), Min:36.3 °C (97.3 °F), Max:36.8 °C (98.3 °F)   Temperature: 36.3 °C (97.3 °F)  Pulse  Av.7  Min: 60  Max: 122    Blood Pressure: 137/88      Respiratory      Respiration: 18, Pulse Oximetry: 96 %        RUL Breath Sounds: Diminished, RML Breath Sounds: Diminished, RLL Breath Sounds: Diminished, STEPH Breath Sounds: Diminished, LLL Breath Sounds: Diminished    Fluids    Intake/Output Summary (Last 24 hours) at 18 1650  Last data filed at 18 0400   Gross per 24 hour   Intake              720 ml   Output              900 ml   Net             -180 ml       Nutrition  Orders Placed This Encounter   Procedures   • Diet Order Diabetic     Standing Status:   Standing     Number of Occurrences:   1     Order Specific Question:   Diet:     Answer:   Diabetic [3]     Physical Exam   Constitutional: He is oriented to person, place, and time. He appears well-developed and well-nourished. He is cooperative.   HENT:   Head: Normocephalic and  atraumatic.   Eyes: Conjunctivae are normal. Right eye exhibits no discharge.   Cardiovascular: Normal rate and regular rhythm.    Pulmonary/Chest: Effort normal and breath sounds normal. He has no wheezes. He has no rales.   Abdominal: Soft. There is no tenderness. There is no rebound and no guarding.   Musculoskeletal:   Right 4th toe amputation with dark clotted blood and clear drainage, plantar foot with clear drainage and erythema, nontender, reviewed wound photos as well   Neurological: He is alert and oriented to person, place, and time.   Skin: Skin is warm and dry.   Psychiatric: He is agitated. He does not exhibit a depressed mood.   Nursing note and vitals reviewed.      Recent Labs      08/14/18   0505   WBC  8.2   RBC  4.49*   HEMOGLOBIN  12.6*   HEMATOCRIT  37.5*   MCV  83.5   MCH  28.1   MCHC  33.6*   RDW  39.5   PLATELETCT  369   MPV  9.1     Recent Labs      08/12/18   0407  08/13/18   0350  08/14/18   0505   SODIUM  141  137  139   POTASSIUM  3.6  3.8  3.5*   CHLORIDE  111  108  111   CO2  21  19*  21   GLUCOSE  141*  132*  129*   BUN  14  15  13   CREATININE  0.80  0.87  0.77   CALCIUM  8.9  8.8  8.7                      Assessment/Plan     * Osteomyelitis due to type 2 diabetes mellitus (HCC)   Assessment & Plan    - ID and ortho consulted, recs appreciated  -  limb preservation evaluation completed  - MRI of right foot showing 4th and 5th toe osteomyelitis, septic arthritis, and cellulitis  - 8/12 right 4th toe amputation done  - will get PT/OT once cleared by ortho, don't anticipate significant needs  - Wound Cx + group b strep, strep viridans;  Cont unasyn          Uncontrolled diabetes mellitus (HCC)   Assessment & Plan    Hyperglycemia  - cont lantus 30U qPM, ISS, hypoglycemic protocol, diabetic diet  - A1c is 12  - diabetes education        Hypertension- (present on admission)   Assessment & Plan    continue Prinivil and amlodipine          Suicidal ideation   Assessment & Plan    - pt  "placed on legal hold 8/11 when trying to leave AMA and his plan for worsening infection was to \"kill himself\" and saying he has plenty of guns at home  - psych following            Hyperlipidemia- (present on admission)   Assessment & Plan    continue home Lipitor.          Quality-Core Measures   Reviewed items::  EKG reviewed, Labs reviewed, Medications reviewed and Radiology images reviewed  Saavedra catheter::  No Saavedra  DVT prophylaxis pharmacological::  Enoxaparin (Lovenox)        "

## 2018-08-14 NOTE — PROGRESS NOTES
Diabetic foot boot was delivered and fitted to patient LLE.  If any further assistance needed, please call extension [phone] or place order for Ortho Technician assistance as a communication order in [employer].

## 2018-08-15 LAB
ANION GAP SERPL CALC-SCNC: 8 MMOL/L (ref 0–11.9)
BACTERIA SPEC ANAEROBE CULT: NORMAL
BACTERIA SPEC ANAEROBE CULT: NORMAL
BASOPHILS # BLD AUTO: 0.9 % (ref 0–1.8)
BASOPHILS # BLD: 0.07 K/UL (ref 0–0.12)
BUN SERPL-MCNC: 12 MG/DL (ref 8–22)
CALCIUM SERPL-MCNC: 8.7 MG/DL (ref 8.5–10.5)
CHLORIDE SERPL-SCNC: 110 MMOL/L (ref 96–112)
CO2 SERPL-SCNC: 22 MMOL/L (ref 20–33)
CREAT SERPL-MCNC: 0.87 MG/DL (ref 0.5–1.4)
EOSINOPHIL # BLD AUTO: 0.41 K/UL (ref 0–0.51)
EOSINOPHIL NFR BLD: 5.2 % (ref 0–6.9)
ERYTHROCYTE [DISTWIDTH] IN BLOOD BY AUTOMATED COUNT: 40.4 FL (ref 35.9–50)
GLUCOSE BLD-MCNC: 161 MG/DL (ref 65–99)
GLUCOSE BLD-MCNC: 265 MG/DL (ref 65–99)
GLUCOSE SERPL-MCNC: 167 MG/DL (ref 65–99)
HCT VFR BLD AUTO: 37.7 % (ref 42–52)
HGB BLD-MCNC: 12.4 G/DL (ref 14–18)
IMM GRANULOCYTES # BLD AUTO: 0.1 K/UL (ref 0–0.11)
IMM GRANULOCYTES NFR BLD AUTO: 1.3 % (ref 0–0.9)
LYMPHOCYTES # BLD AUTO: 1.36 K/UL (ref 1–4.8)
LYMPHOCYTES NFR BLD: 17.3 % (ref 22–41)
MCH RBC QN AUTO: 27.8 PG (ref 27–33)
MCHC RBC AUTO-ENTMCNC: 32.9 G/DL (ref 33.7–35.3)
MCV RBC AUTO: 84.5 FL (ref 81.4–97.8)
MONOCYTES # BLD AUTO: 0.55 K/UL (ref 0–0.85)
MONOCYTES NFR BLD AUTO: 7 % (ref 0–13.4)
NEUTROPHILS # BLD AUTO: 5.37 K/UL (ref 1.82–7.42)
NEUTROPHILS NFR BLD: 68.3 % (ref 44–72)
NRBC # BLD AUTO: 0 K/UL
NRBC BLD-RTO: 0 /100 WBC
PLATELET # BLD AUTO: 374 K/UL (ref 164–446)
PMV BLD AUTO: 9 FL (ref 9–12.9)
POTASSIUM SERPL-SCNC: 3.8 MMOL/L (ref 3.6–5.5)
RBC # BLD AUTO: 4.46 M/UL (ref 4.7–6.1)
SIGNIFICANT IND 70042: NORMAL
SIGNIFICANT IND 70042: NORMAL
SITE SITE: NORMAL
SITE SITE: NORMAL
SODIUM SERPL-SCNC: 140 MMOL/L (ref 135–145)
SOURCE SOURCE: NORMAL
SOURCE SOURCE: NORMAL
WBC # BLD AUTO: 7.9 K/UL (ref 4.8–10.8)

## 2018-08-15 PROCEDURE — 770001 HCHG ROOM/CARE - MED/SURG/GYN PRIV*

## 2018-08-15 PROCEDURE — 99232 SBSQ HOSP IP/OBS MODERATE 35: CPT | Performed by: INTERNAL MEDICINE

## 2018-08-15 PROCEDURE — 36415 COLL VENOUS BLD VENIPUNCTURE: CPT

## 2018-08-15 PROCEDURE — 700102 HCHG RX REV CODE 250 W/ 637 OVERRIDE(OP): Performed by: HOSPITALIST

## 2018-08-15 PROCEDURE — 97165 OT EVAL LOW COMPLEX 30 MIN: CPT

## 2018-08-15 PROCEDURE — A9270 NON-COVERED ITEM OR SERVICE: HCPCS | Performed by: HOSPITALIST

## 2018-08-15 PROCEDURE — 700102 HCHG RX REV CODE 250 W/ 637 OVERRIDE(OP): Performed by: PSYCHIATRY & NEUROLOGY

## 2018-08-15 PROCEDURE — 82962 GLUCOSE BLOOD TEST: CPT | Mod: 91

## 2018-08-15 PROCEDURE — G8988 SELF CARE GOAL STATUS: HCPCS | Mod: CH

## 2018-08-15 PROCEDURE — 700102 HCHG RX REV CODE 250 W/ 637 OVERRIDE(OP): Performed by: INTERNAL MEDICINE

## 2018-08-15 PROCEDURE — 700105 HCHG RX REV CODE 258: Performed by: HOSPITALIST

## 2018-08-15 PROCEDURE — 80048 BASIC METABOLIC PNL TOTAL CA: CPT

## 2018-08-15 PROCEDURE — A9270 NON-COVERED ITEM OR SERVICE: HCPCS | Performed by: PSYCHIATRY & NEUROLOGY

## 2018-08-15 PROCEDURE — 700111 HCHG RX REV CODE 636 W/ 250 OVERRIDE (IP): Performed by: INTERNAL MEDICINE

## 2018-08-15 PROCEDURE — A9270 NON-COVERED ITEM OR SERVICE: HCPCS | Performed by: INTERNAL MEDICINE

## 2018-08-15 PROCEDURE — G8987 SELF CARE CURRENT STATUS: HCPCS | Mod: CJ

## 2018-08-15 PROCEDURE — 85025 COMPLETE CBC W/AUTO DIFF WBC: CPT

## 2018-08-15 PROCEDURE — 700111 HCHG RX REV CODE 636 W/ 250 OVERRIDE (IP): Performed by: HOSPITALIST

## 2018-08-15 RX ADMIN — METFORMIN HYDROCHLORIDE 850 MG: 850 TABLET ORAL at 18:06

## 2018-08-15 RX ADMIN — INSULIN HUMAN 5 UNITS: 100 INJECTION, SOLUTION PARENTERAL at 18:11

## 2018-08-15 RX ADMIN — ARIPIPRAZOLE 5 MG: 10 TABLET ORAL at 06:49

## 2018-08-15 RX ADMIN — INSULIN GLARGINE 30 UNITS: 100 INJECTION, SOLUTION SUBCUTANEOUS at 18:50

## 2018-08-15 RX ADMIN — LISINOPRIL 40 MG: 20 TABLET ORAL at 06:50

## 2018-08-15 RX ADMIN — INSULIN HUMAN 5 UNITS: 100 INJECTION, SOLUTION PARENTERAL at 11:37

## 2018-08-15 RX ADMIN — ENOXAPARIN SODIUM 40 MG: 100 INJECTION SUBCUTANEOUS at 06:50

## 2018-08-15 RX ADMIN — AMPICILLIN AND SULBACTAM 3 G: 2; 1 INJECTION, POWDER, FOR SOLUTION INTRAVENOUS at 11:27

## 2018-08-15 RX ADMIN — AMLODIPINE BESYLATE 5 MG: 10 TABLET ORAL at 06:49

## 2018-08-15 RX ADMIN — INSULIN HUMAN 3 UNITS: 100 INJECTION, SOLUTION PARENTERAL at 21:14

## 2018-08-15 RX ADMIN — AMPICILLIN AND SULBACTAM 3 G: 2; 1 INJECTION, POWDER, FOR SOLUTION INTRAVENOUS at 06:48

## 2018-08-15 RX ADMIN — AMPICILLIN AND SULBACTAM 3 G: 2; 1 INJECTION, POWDER, FOR SOLUTION INTRAVENOUS at 18:09

## 2018-08-15 RX ADMIN — INSULIN HUMAN 2 UNITS: 100 INJECTION, SOLUTION PARENTERAL at 07:00

## 2018-08-15 RX ADMIN — ATORVASTATIN CALCIUM 20 MG: 20 TABLET, FILM COATED ORAL at 06:49

## 2018-08-15 ASSESSMENT — COGNITIVE AND FUNCTIONAL STATUS - GENERAL
SUGGESTED CMS G CODE MODIFIER DAILY ACTIVITY: CJ
HELP NEEDED FOR BATHING: A LITTLE
DAILY ACTIVITIY SCORE: 22
DRESSING REGULAR LOWER BODY CLOTHING: A LITTLE

## 2018-08-15 ASSESSMENT — ACTIVITIES OF DAILY LIVING (ADL): TOILETING: INDEPENDENT

## 2018-08-15 ASSESSMENT — PAIN SCALES - GENERAL
PAINLEVEL_OUTOF10: 0

## 2018-08-15 ASSESSMENT — ENCOUNTER SYMPTOMS
NAUSEA: 0
ABDOMINAL PAIN: 0
SHORTNESS OF BREATH: 0
DEPRESSION: 0

## 2018-08-15 NOTE — CARE PLAN
Problem: Infection  Goal: Will remain free from infection  Outcome: PROGRESSING AS EXPECTED  Patient educated about hand hygiene, and oral care.      Problem: Venous Thromboembolism (VTW)/Deep Vein Thrombosis (DVT) Prevention:  Goal: Patient will participate in Venous Thrombosis (VTE)/Deep Vein Thrombosis (DVT)Prevention Measures  Outcome: PROGRESSING AS EXPECTED

## 2018-08-15 NOTE — THERAPY
"Occupational Therapy Evaluation completed.   Functional Status: Min A LB dressing (for boot), CGA stand-pivot transfers, supv standing grooming   Plan of Care: Will benefit from Occupational Therapy 2 times per week  Discharge Recommendations:  Equipment: Will Continue to Assess for Equipment Needs. Post-acute therapy: TBD    See \"Rehab Therapy-Acute\" Patient Summary Report for complete documentation.    62 y.o. male with h/o parnoid schizophrenia, uncontrolled DM, HTN, admitted with R toe infection. Dx with osteomyelitis and underwent amputation of R 4th toe. Seen now for OT eval. Trained pt on heel WB RLE and donning cam boot. Pt requires assist with boot. Pt had LOB x 2 while mobilizing to toilet without AD (due to bulk and weight of boot). Introduced FWW for safety. Pt requires cues to sequence use of walker, use correctly as compensatory device. Pt is very concerned about his 5 cats. Requested we attempt to contact his neighbor via postal mail (pt does not have phone number) to request neighbor attend to cats. RN reports she will follow up on request. Acute OT to follow pt to maximize functional independence and safety.     "

## 2018-08-15 NOTE — PROGRESS NOTES
[facility] Hospitalist Progress Note    Date of Service: 8/15/2018    Chief Complaint  62 y.o. male admitted 2018 with DM, HTN, HLD who presented with right foot osteomyelitis s/p 4th toe amputation . With suicidal ideation.    Interval Problem Update  Glucose remains elevated, reordered home metformin given his renal function is stable  He asks for oral option for longterm antibiotics, I discussed with ID    Consultants/Specialty  ID  Psych  Ortho - [facility]    Disposition  Legal hold  Wound care        Review of Systems   Constitutional: Negative for malaise/fatigue.   HENT: Negative for congestion.    Respiratory: Negative for shortness of breath.    Cardiovascular: Negative for chest pain.   Gastrointestinal: Negative for abdominal pain and nausea.   Musculoskeletal: Negative for joint pain.   Skin: Negative for itching.   Psychiatric/Behavioral: Negative for depression and suicidal ideas.      Physical Exam  Laboratory/Imaging   Hemodynamics  Temp (24hrs), Av.7 °C (98.1 °F), Min:36.4 °C (97.6 °F), Max:37.1 °C (98.8 °F)   Temperature: 36.4 °C (97.6 °F)  Pulse  Av.6  Min: 60  Max: 122    Blood Pressure: 123/75      Respiratory      Respiration: 16, Pulse Oximetry: 95 %        RUL Breath Sounds: Clear, RML Breath Sounds: Diminished, RLL Breath Sounds: Diminished, STEPH Breath Sounds: Clear, LLL Breath Sounds: Diminished    Fluids    Intake/Output Summary (Last 24 hours) at 08/15/18 1448  Last data filed at 08/15/18 1255   Gross per 24 hour   Intake             1180 ml   Output              700 ml   Net              480 ml       Nutrition  Orders Placed This Encounter   Procedures   • Diet Order Diabetic     Standing Status:   Standing     Number of Occurrences:   1     Order Specific Question:   Diet:     Answer:   Diabetic [3]     Physical Exam   Constitutional: He is oriented to person, place, and time. He appears well-developed and well-nourished. He is cooperative.   HENT:   Head: Normocephalic and  atraumatic.   Eyes: Conjunctivae are normal. Right eye exhibits no discharge. Left eye exhibits no discharge.   Cardiovascular: Normal rate and regular rhythm.    Pulmonary/Chest: Effort normal and breath sounds normal. He has no wheezes. He has no rales.   Abdominal: Soft. There is no tenderness. There is no rebound and no guarding.   Musculoskeletal:   Right 4th toe amputation with dark clotted blood and clear drainage, plantar foot with clear drainage and erythema, nontender, reviewed wound photos as well   Neurological: He is alert and oriented to person, place, and time.   Skin: Skin is warm and dry.   Psychiatric: He is agitated. He does not exhibit a depressed mood.   Nursing note and vitals reviewed.      Recent Labs      08/14/18   0505  08/15/18   0531   WBC  8.2  7.9   RBC  4.49*  4.46*   HEMOGLOBIN  12.6*  12.4*   HEMATOCRIT  37.5*  37.7*   MCV  83.5  84.5   MCH  28.1  27.8   MCHC  33.6*  32.9*   RDW  39.5  40.4   PLATELETCT  369  374   MPV  9.1  9.0     Recent Labs      08/13/18   0350  08/14/18   0505  08/15/18   0531   SODIUM  137  139  140   POTASSIUM  3.8  3.5*  3.8   CHLORIDE  108  111  110   CO2  19*  21  22   GLUCOSE  132*  129*  167*   BUN  15  13  12   CREATININE  0.87  0.77  0.87   CALCIUM  8.8  8.7  8.7                      Assessment/Plan     * Osteomyelitis due to type 2 diabetes mellitus (HCC)   Assessment & Plan    - ID and ortho consulted, recs appreciated  -  limb preservation evaluation completed  - MRI of right foot showing 4th and 5th toe osteomyelitis, septic arthritis, and cellulitis  - 8/12 right 4th toe amputation done  - will get PT/OT once cleared by ortho, don't anticipate significant needs  - Wound Cx + group b strep, strep viridans;  Cont unasyn, longterm antibiotics per ID          Uncontrolled diabetes mellitus (HCC)   Assessment & Plan    Hyperglycemia  - cont lantus 30U qPM, ISS, hypoglycemic protocol, diabetic diet  - A1c is 12  - diabetes education  - restarted home  "metformin        Hypertension- (present on admission)   Assessment & Plan    continue Prinivil and amlodipine          Suicidal ideation   Assessment & Plan    - pt placed on legal hold 8/11 when trying to leave AMA and his plan for worsening infection was to \"kill himself\" and saying he has plenty of guns at home  - psych following            Hyperlipidemia- (present on admission)   Assessment & Plan    continue home Lipitor.          Quality-Core Measures   Reviewed items::  EKG reviewed, Labs reviewed, Medications reviewed and Radiology images reviewed  Saavedra catheter::  No Saavedra  DVT prophylaxis pharmacological::  Enoxaparin (Lovenox)        "

## 2018-08-15 NOTE — PROGRESS NOTES
Assumed care at 1900 received report from day shift RN.    A&Ox4.    denies Pain,   denies nausea,   tolerating diabetic diet,   +voiding, + BM,  Patient c/o of lack of sleep     patient is a stand by assist to the bathroom.    Room air   Patient is on a legal hold with 1:1 sitter.       Patient call light within reach, bed in the lowest position, hourly rounding in place.

## 2018-08-15 NOTE — PROGRESS NOTES
Infectious Disease Progress Note    Author: Lianet Simpson M.D. Date & Time of service: 8/15/2018  4:42 PM    Chief Complaint:  Follow-up for right diabetic foot infection    Interval History:   AF WBC 9.7 patient denies any complaints however is a poor historian given paranoid schizophrenia, patient threatened to leave AMA   AF no CBC no new issues to report the patient is a poor historian, sitter at bedside as patient stated he wanted to kill himself yesterday, plan for IND with possible toe amputation today  2018-T-max 99.  No new issues overnight.  Underwent surgery this morning  2018-T-max 98.3.  No new issues overnight.  8/15/2018-T-max 98.  Wants to go home and apparently has been refusing PICC .  Labs Reviewed, Medications Reviewed, Radiology Reviewed and Wound Reviewed.    Review of Systems:  Review of Systems   Unable to perform ROS: Psychiatric disorder       Hemodynamics:  Temp (24hrs), Av.6 °C (97.8 °F), Min:36.4 °C (97.6 °F), Max:36.7 °C (98 °F)  Temperature: 36.4 °C (97.6 °F)  Pulse  Av.6  Min: 60  Max: 122   Blood Pressure: 123/75       Physical Exam:  Physical Exam   Constitutional: He appears well-developed.   Disheveled   HENT:   Head: Normocephalic and atraumatic.   Eyes: Pupils are equal, round, and reactive to light. EOM are normal.   Poor eye contact   Neck: Neck supple.   Cardiovascular: Normal rate.    Irregular   Pulmonary/Chest: Effort normal and breath sounds normal.   Abdominal: Soft. There is no tenderness.   Musculoskeletal:   Refer to the wound care pictures and note   Neurological: He is alert.   Psychiatric:   Flat and odd affect       Meds:    Current Facility-Administered Medications:   •  metFORMIN  •  enoxaparin (LOVENOX) injection  •  ampicillin-sulbactam (UNASYN) IV  •  ARIPiprazole  •  amLODIPine  •  lisinopril  •  atorvastatin  •  senna-docusate **AND** polyethylene glycol/lytes **AND** magnesium hydroxide **AND** bisacodyl  •  NS  •   acetaminophen  •  insulin regular **AND** Accu-Chek ACHS **AND** NOTIFY MD and PharmD **AND** glucose 4 g **AND** dextrose 50%  •  ondansetron  •  ondansetron  •  promethazine  •  promethazine  •  prochlorperazine  •  insulin glargine  •  labetalol    Labs:  Recent Labs      08/14/18   0505  08/15/18   0531   WBC  8.2  7.9   RBC  4.49*  4.46*   HEMOGLOBIN  12.6*  12.4*   HEMATOCRIT  37.5*  37.7*   MCV  83.5  84.5   MCH  28.1  27.8   RDW  39.5  40.4   PLATELETCT  369  374   MPV  9.1  9.0   NEUTSPOLYS  66.40  68.30   LYMPHOCYTES  18.30*  17.30*   MONOCYTES  7.30  7.00   EOSINOPHILS  6.20  5.20   BASOPHILS  0.70  0.90     Recent Labs      08/13/18   0350  08/14/18   0505  08/15/18   0531   SODIUM  137  139  140   POTASSIUM  3.8  3.5*  3.8   CHLORIDE  108  111  110   CO2  19*  21  22   GLUCOSE  132*  129*  167*   BUN  15  13  12     Recent Labs      08/13/18   0350  08/14/18   0505  08/15/18   0531   CREATININE  0.87  0.77  0.87       Imaging:  Dx-foot-complete 3+ Right    Result Date: 8/9/2018 8/9/2018 6:43 PM HISTORY/REASON FOR EXAM:  Right foot pain and swelling. Multiple open wounds. TECHNIQUE/EXAM DESCRIPTION AND NUMBER OF VIEWS: 3 views of the RIGHT foot. COMPARISON:  None. FINDINGS: No acute fracture or dislocation is present. There is a moderate hallux valgus deformity present. There is soft tissue swelling surrounding the 4th and 5th toes. There is irregularity of the 5th MTP joint and the 5th IP joint without evidence of acute erosion. This may represent prior injury or inflammation. No lytic or blastic bony defect is identified to suggest acute osteomyelitis.     1.  No acute right foot fracture or dislocation. 2.  Soft tissue swelling surrounding the 4th and 5th toes. 3.  No plain film evidence of acute osteomyelitis. 4.  Degenerative or post inflammatory irregularity of the right 5th MTP joint and IP joint. 5.  Moderate hallux valgus deformity.      Micro:  Results     Procedure Component Value Units  Date/Time    CULTURE TISSUE W/ GRM STAIN [[id number]]  (Abnormal) Collected:  08/12/18 1751    Order Status:  Completed Specimen:  Tissue Updated:  08/15/18 1050     Significant Indicator POS (POS)     Source TISS     Site Right 4th Toe Tissue     Tissue Culture -- (A)     Gram Stain Result Many Gram positive cocci. (A)     Tissue Culture Streptococcus agalactiae (Group B)  Moderate growth   (A)      Viridans Streptococcus  Moderate growth   (A)    ANAEROBIC CULTURE [[id number]] Collected:  08/12/18 1751    Order Status:  Completed Specimen:  Tissue Updated:  08/15/18 1050     Significant Indicator NEG     Source TISS     Site Right 4th Toe Tissue     Anaerobic Culture, Culture Res No Anaerobes isolated.    CULTURE WOUND W/ GRAM STAIN [[id number]]  (Abnormal) Collected:  08/12/18 1749    Order Status:  Completed Specimen:  Wound Updated:  08/15/18 1048     Significant Indicator POS (POS)     Source WND     Site Right 4th Toe     Culture Result Wound -- (A)     Gram Stain Result Rare WBCs.  Few Gram positive cocci.       Culture Result Wound Streptococcus agalactiae (Group B)  Moderate growth   (A)      Viridans Streptococcus  Moderate growth   (A)    ANAEROBIC CULTURE [[id number]] Collected:  08/12/18 1749    Order Status:  Completed Specimen:  Wound Updated:  08/15/18 1048     Significant Indicator NEG     Source WND     Site Right 4th Toe     Anaerobic Culture, Culture Res No Anaerobes isolated.    GRAM STAIN [[id number]] Collected:  08/12/18 1751    Order Status:  Completed Specimen:  Tissue Updated:  08/13/18 0923     Significant Indicator .     Source TISS     Site Right 4th Toe Tissue     Gram Stain Result Many Gram positive cocci.    GRAM STAIN [[id number]] Collected:  08/12/18 1749    Order Status:  Completed Specimen:  Wound Updated:  08/13/18 0923     Significant Indicator .     Source WND     Site Right 4th Toe     Gram Stain Result Rare WBCs.  Few Gram positive cocci.      CULTURE WOUND W/O GRAM STAIN  [[id number]]  (Abnormal) Collected:  08/09/18 2108    Order Status:  Completed Specimen:  Wound from Right Foot Updated:  08/11/18 1044     Significant Indicator POS (POS)     Source WND     Site RIGHT FOOT     Culture Result Wound Light growth mixed skin phoebe. (A)      Streptococcus agalactiae (Group B)  Moderate growth   (A)    Culture Respiratory W/ GRM STN [[id number]] Collected:  08/09/18 0000    Order Status:  Canceled Specimen:  Sputum from Sputum           Assessment:  Active Hospital Problems    Diagnosis   • *Osteomyelitis due to type 2 diabetes mellitus (HCC) [E11.69, M86.9]   • Uncontrolled diabetes mellitus (HCC) [E11.65]   • Hypertension [I10]   • Hyponatremia [E87.1]   • Hyperlipidemia [E78.5]       Plan:  Right diabetic foot infection-additional workup  No fevers  Leukocytosis resolved  X-ray negative  ESR 93  MRI pending  Wound culture+ group B streptococcus strep viridans  Continue Unasyn  Wound care  LPS following  Underwent right fourth toe amputation and right foot I&D  In view of foot abscess he will need longer antibiotics-least 4 weeks  If he refuses IV antibiotics then will change him to p.o. Augmentin   ?  SNIF placement    DM 2, poorly controlled  Hemoglobin A1c 12   Maintain blood sugars less than 150 to control infection and promote wound healing    Paranoid schizophrenia  Sitter at bedside  May be an impediment to his care and treatment    Discussed with internal medicine/

## 2018-08-15 NOTE — OP REPORT
DATE OF SERVICE:  08/12/2018    PREOPERATIVE DIAGNOSES:  1.  Osteomyelitis, right second toe.  2.  Diabetes mellitus.    POSTOPERATIVE DIAGNOSES:  1.  Osteomyelitis, right second toe.  2.  Diabetes mellitus.  3.  Hypertrophic callus, bilateral feet.    SURGICAL PROCEDURES:  1.  Right fourth toe amputation.  2.  Trimming of callus, right plantar foot.  3.  Debridement of abscess, right foot.  4.  Trimming of callus, left foot.    SURGEON:  Jamal Starks MD    ASSISTANT:  None.    ANESTHESIOLOGIST:  Jorden Rivera MD    ANESTHETIC:  General.    ESTIMATED BLOOD LOSS:  10 mL.    INDICATIONS:  The patient is 62 years old, he has diabetes.  He has had a   previous partial fifth toe amputation.  He now has an infected necrotic fourth   toe.  I recommended amputation.  Risks include bleeding, persistent or   worsening infection, pain, stiffness, wound healing problems, thromboembolic   phenomena, anesthetic and medical complications, etc.    PROCEDURE:  The patient was identified in the preoperative holding area, right   foot was marked.  He was taken to the operating room where general anesthetic   was administered.  He was already on intravenous antibiotics.  No additional   doses were given.  The right lower extremity was prepped and draped in sterile   fashion.  Time-out was held to confirm the patient identity and correct   surgical site.    The right fourth toe was circumferentially incised at the base.  I   disarticulated the toe at the MTP joint.  There was some pus, which I swabbed   and sent for culture in the plantar wound of the toe.  I removed some of the   bone and sent this for tissue culture.  We sent the rest of the toe as   pathology specimen.    There was a callus under the fourth toe, which I trimmed down to an underlying   ulcer which did not extend to bone.  This was done with a 15 blade.    There was also a large area of sloughing epidermis over the plantar aspect of   the foot.  I removed this with  scalpel and rongeur.  Underneath this, there   was some necrotic subcutaneous tissue.  I opened up the superficial plantar   space.  There was no pus.  Underneath the fifth metatarsal, there was an   abscess which I incised and drained.  This was swabbed and sent for culture.    The wounds were irrigated.  The amputation site was closed with 3-0 Vicryl and   3-0 nylon.  The other wounds were packed open.  Dressings were applied.    The left foot had a callus on the fifth toe as well.  This was trimmed with   the 15 blade.  There was a superficial ulceration on this side.    The patient was extubated and taken to recovery room in stable condition.    POSTOPERATIVE PLAN:  1.  Intravenous antibiotics and follow cultures.  2.  Nonweightbearing right lower extremity.  3.  Guarded prognosis for limb salvage.       ____________________________________     MD OK JEWELL / CASIMIRO    DD:  08/14/2018 21:48:11  DT:  08/15/2018 01:04:14    D#:  [id number]  Job#:  [id number]

## 2018-08-16 LAB
ANION GAP SERPL CALC-SCNC: 6 MMOL/L (ref 0–11.9)
BUN SERPL-MCNC: 13 MG/DL (ref 8–22)
CALCIUM SERPL-MCNC: 8.7 MG/DL (ref 8.5–10.5)
CHLORIDE SERPL-SCNC: 111 MMOL/L (ref 96–112)
CO2 SERPL-SCNC: 21 MMOL/L (ref 20–33)
CREAT SERPL-MCNC: 0.74 MG/DL (ref 0.5–1.4)
GLUCOSE BLD-MCNC: 161 MG/DL (ref 65–99)
GLUCOSE BLD-MCNC: 161 MG/DL (ref 65–99)
GLUCOSE BLD-MCNC: 169 MG/DL (ref 65–99)
GLUCOSE BLD-MCNC: 209 MG/DL (ref 65–99)
GLUCOSE BLD-MCNC: 231 MG/DL (ref 65–99)
GLUCOSE BLD-MCNC: 254 MG/DL (ref 65–99)
GLUCOSE SERPL-MCNC: 171 MG/DL (ref 65–99)
POTASSIUM SERPL-SCNC: 3.8 MMOL/L (ref 3.6–5.5)
SODIUM SERPL-SCNC: 138 MMOL/L (ref 135–145)

## 2018-08-16 PROCEDURE — G8979 MOBILITY GOAL STATUS: HCPCS | Mod: CI

## 2018-08-16 PROCEDURE — 770001 HCHG ROOM/CARE - MED/SURG/GYN PRIV*

## 2018-08-16 PROCEDURE — 80048 BASIC METABOLIC PNL TOTAL CA: CPT

## 2018-08-16 PROCEDURE — 700105 HCHG RX REV CODE 258: Performed by: HOSPITALIST

## 2018-08-16 PROCEDURE — 700102 HCHG RX REV CODE 250 W/ 637 OVERRIDE(OP): Performed by: PSYCHIATRY & NEUROLOGY

## 2018-08-16 PROCEDURE — 700102 HCHG RX REV CODE 250 W/ 637 OVERRIDE(OP): Performed by: HOSPITALIST

## 2018-08-16 PROCEDURE — 700102 HCHG RX REV CODE 250 W/ 637 OVERRIDE(OP): Performed by: INTERNAL MEDICINE

## 2018-08-16 PROCEDURE — A9270 NON-COVERED ITEM OR SERVICE: HCPCS | Performed by: HOSPITALIST

## 2018-08-16 PROCEDURE — A9270 NON-COVERED ITEM OR SERVICE: HCPCS | Performed by: PSYCHIATRY & NEUROLOGY

## 2018-08-16 PROCEDURE — 700111 HCHG RX REV CODE 636 W/ 250 OVERRIDE (IP): Performed by: HOSPITALIST

## 2018-08-16 PROCEDURE — G8978 MOBILITY CURRENT STATUS: HCPCS | Mod: CJ

## 2018-08-16 PROCEDURE — 82962 GLUCOSE BLOOD TEST: CPT

## 2018-08-16 PROCEDURE — 99232 SBSQ HOSP IP/OBS MODERATE 35: CPT | Performed by: INTERNAL MEDICINE

## 2018-08-16 PROCEDURE — 36415 COLL VENOUS BLD VENIPUNCTURE: CPT

## 2018-08-16 PROCEDURE — 700105 HCHG RX REV CODE 258

## 2018-08-16 PROCEDURE — A9270 NON-COVERED ITEM OR SERVICE: HCPCS | Performed by: INTERNAL MEDICINE

## 2018-08-16 PROCEDURE — 700111 HCHG RX REV CODE 636 W/ 250 OVERRIDE (IP): Performed by: INTERNAL MEDICINE

## 2018-08-16 PROCEDURE — 97161 PT EVAL LOW COMPLEX 20 MIN: CPT

## 2018-08-16 RX ORDER — SODIUM CHLORIDE 9 MG/ML
INJECTION, SOLUTION INTRAVENOUS
Status: COMPLETED
Start: 2018-08-16 | End: 2018-08-16

## 2018-08-16 RX ADMIN — INSULIN HUMAN 3 UNITS: 100 INJECTION, SOLUTION PARENTERAL at 17:12

## 2018-08-16 RX ADMIN — Medication 2 TABLET: at 18:00

## 2018-08-16 RX ADMIN — INSULIN HUMAN 2 UNITS: 100 INJECTION, SOLUTION PARENTERAL at 06:38

## 2018-08-16 RX ADMIN — LISINOPRIL 40 MG: 20 TABLET ORAL at 06:28

## 2018-08-16 RX ADMIN — METFORMIN HYDROCHLORIDE 850 MG: 850 TABLET ORAL at 11:18

## 2018-08-16 RX ADMIN — INSULIN HUMAN 2 UNITS: 100 INJECTION, SOLUTION PARENTERAL at 11:18

## 2018-08-16 RX ADMIN — AMPICILLIN AND SULBACTAM 3 G: 2; 1 INJECTION, POWDER, FOR SOLUTION INTRAVENOUS at 22:09

## 2018-08-16 RX ADMIN — ATORVASTATIN CALCIUM 20 MG: 20 TABLET, FILM COATED ORAL at 06:28

## 2018-08-16 RX ADMIN — AMPICILLIN AND SULBACTAM 3 G: 2; 1 INJECTION, POWDER, FOR SOLUTION INTRAVENOUS at 02:55

## 2018-08-16 RX ADMIN — ARIPIPRAZOLE 5 MG: 10 TABLET ORAL at 06:27

## 2018-08-16 RX ADMIN — AMPICILLIN AND SULBACTAM 3 G: 2; 1 INJECTION, POWDER, FOR SOLUTION INTRAVENOUS at 17:04

## 2018-08-16 RX ADMIN — AMLODIPINE BESYLATE 5 MG: 10 TABLET ORAL at 06:28

## 2018-08-16 RX ADMIN — METFORMIN HYDROCHLORIDE 850 MG: 850 TABLET ORAL at 07:45

## 2018-08-16 RX ADMIN — SODIUM CHLORIDE 500 ML: 9 INJECTION, SOLUTION INTRAVENOUS at 06:29

## 2018-08-16 RX ADMIN — INSULIN GLARGINE 30 UNITS: 100 INJECTION, SOLUTION SUBCUTANEOUS at 17:12

## 2018-08-16 RX ADMIN — ENOXAPARIN SODIUM 40 MG: 100 INJECTION SUBCUTANEOUS at 06:26

## 2018-08-16 RX ADMIN — INSULIN HUMAN 2 UNITS: 100 INJECTION, SOLUTION PARENTERAL at 22:13

## 2018-08-16 RX ADMIN — AMPICILLIN AND SULBACTAM 3 G: 2; 1 INJECTION, POWDER, FOR SOLUTION INTRAVENOUS at 07:48

## 2018-08-16 RX ADMIN — METFORMIN HYDROCHLORIDE 850 MG: 850 TABLET ORAL at 17:05

## 2018-08-16 ASSESSMENT — GAIT ASSESSMENTS
DISTANCE (FEET): 100
GAIT LEVEL OF ASSIST: STAND BY ASSIST

## 2018-08-16 ASSESSMENT — PAIN SCALES - GENERAL
PAINLEVEL_OUTOF10: 0

## 2018-08-16 ASSESSMENT — COGNITIVE AND FUNCTIONAL STATUS - GENERAL
WALKING IN HOSPITAL ROOM: A LITTLE
SUGGESTED CMS G CODE MODIFIER MOBILITY: CJ
MOBILITY SCORE: 22
CLIMB 3 TO 5 STEPS WITH RAILING: A LITTLE

## 2018-08-16 ASSESSMENT — ENCOUNTER SYMPTOMS: ABDOMINAL PAIN: 0

## 2018-08-16 NOTE — PROGRESS NOTES
Date: 6/19/2024  Patient name: Leonie Denney  YOB: 1979  Medical Record Number: [id number]  Primary Coverage: Payor: [employer] INS / Plan: HMO [employer] / Product Type: HMO /   Secondary Coverage:   Insurance ID: [id number]  Patient Address: [address] [place] [id number]  Telephone Information:   Home Phone [phone]   Mobile [phone]       Encounter Date: 6/19/2024  Provider: Renee Belcher MD  Diagnosis:     ICD-10-CM   1. Right foot ulcer, with fat layer exposed (HCC)  L97.512   2. Diabetic foot ulcer with osteomyelitis (HCC)  E11.621    E11.69    L97.509    M86.9   3. Type 2 diabetes mellitus with hyperglycemia, with long-term current use of insulin ([facility])  E11.65    Z79.4   4. Hallux limitus of right foot  M20.5X1   5. Infected wound  T14.8XXA    L08.9   6. Diabetic polyneuropathy associated with type 2 diabetes mellitus ([facility])  E11.42         Wound 05/08/24 #1 Right great toe amp site Toe (Comment which one) Right (Active)   Date First Assessed/Time First Assessed: 05/08/24 0908    Wound Number (Wound Clinic Only): #1 Right great toe amp site  Primary Wound Type: Old surgical  Location: Toe (Comment which one)  Wound Location Orientation: Right      Assessments 5/8/2024  9:11 AM 6/19/2024  9:22 AM   Wound Image        Drainage Amount Large Moderate   Drainage Description Serosanguineous Serosanguineous   Treatments Compression Compression;Skin Substitutes;Wound Vac - Neg Pressure   Wound Vac Brand -- KCI   Wound Length (cm) 7.5 cm 1.4 cm   Wound Width (cm) 1.2 cm 1 cm   Wound Surface Area (cm^2) 9 cm^2 1.4 cm^2   Wound Depth (cm) -- 0.9 cm   Wound Volume (cm^3) -- 1.26 cm^3   Wound Healing % -- 47   Margins Undefined edges Well-defined edges   Non-staged Wound Description Full thickness Full thickness   Wendy-wound Assessment Dry;Edema;Ecchymosis;Moist;Maceration Edema;Maceration;Moist;Callous   Wound Granulation Tissue -- Spongy;Pink   Wound Bed Granulation (%) -- 70 %   Wound  LEGAL HOLD PATIENT  1:1 Sitter    AOX4. Withdrawn and flat affect noted.  ALEX with a tingling noted to BLE. A generalized weakness is noted  Tolerating diabetic diet well. Denies N/V  Patient had amputation to toe on RLE. Dressing in place is C/D/I  Tolerating RA. Denies SOB  Patient has a boot to help with mobilization.   IV running TKO fluids. Night shift had a hard time getting an IV  POC discussed with patient. No acute distresses noted otherwise at this time. Bed in lowest and locked position. Call light within reach. Will continue to monitor.   Bed Slough (%) 5 % 30 %   Wound Bed Eschar (%) 95 % --   Wound Odor None None   Tunneling? No No   Undermining? No No   Sinus Tracts? No No       Inactive Orders   Date Order Priority Status Authorizing Provider   06/19/24 0949 Cellular tissue product application Old surgical Right Toe (Comment which one) Routine Completed Renee Foreman MD   06/12/24 0947 Debridement Old surgical Right Toe (Comment which one) Routine Completed Renee Foreman MD   06/05/24 1136 Debridement Old surgical Right Toe (Comment which one) Routine Completed Renee Foreman MD   05/29/24 0924 Debridement Old surgical Right Toe (Comment which one) Routine Completed Renee Foreman MD   05/22/24 1140 Debridement Old surgical Right Toe (Comment which one) Routine Completed Renee Foreman MD   05/15/24 1059 Debridement Old surgical Right Toe (Comment which one) Routine Completed Renee Foreman MD   05/08/24 0943 Debridement Old surgical Right Toe (Comment which one) Routine Completed Renee Foreman MD       Wound 05/08/24 #2 Right plantar foot Foot Right;Plantar (Active)   Date First Assessed/Time First Assessed: 05/08/24 0909    Wound Number (Wound Clinic Only): #2 Right plantar foot  Primary Wound Type: Old surgical  Location: Foot  Wound Location Orientation: Right;Plantar      Assessments 5/8/2024  9:13 AM 6/19/2024  9:24 AM   Wound Image       Drainage Amount Small None   Drainage Description Serosanguineous --   Treatments Compression Compression   Wound Length (cm) 2 cm 0.4 cm   Wound Width (cm) 0.9 cm 0.1 cm   Wound Surface Area (cm^2) 1.8 cm^2 0.04 cm^2   Wound Depth (cm) -- 0.1 cm   Wound Volume (cm^3) -- 0.004 cm^3   Wound Healing % -- 100   Margins Undefined edges Poorly defined   Non-staged Wound Description Full thickness Full thickness   Wendy-wound Assessment Dry;Edema;Callous Dry;Callous;Edema   Wound Granulation Tissue -- Spongy;Pale Grey   Wound Bed Granulation (%) -- 100 %   Wound Bed  Epithelium (%) 50 % --   Wound Bed Eschar (%) 50 % --   Wound Odor None None   Tunneling? No No   Undermining? No No   Sinus Tracts? No No       Inactive Orders   Date Order Priority Status Authorizing Provider   05/15/24 1101 Debridement Old surgical Right;Plantar Foot Routine Completed Renee Foreman MD       Negative Pressure Wound Therapy Dorsal;Right (Active)   Placement Date/Time: 05/22/24 1157   Wound Location Orientation: Dorsal;Right      Assessments 5/22/2024 11:23 AM 6/19/2024 10:03 AM   Wound photographed/measured Yes Yes   Machine Status (On) Yes Yes   Site Assessment Clean;Dry;Intact Clean;Dry;Intact   Wendy-wound Assessment Clean;Dry;Intact Clean;Dry;Intact   Unit Type KCI KCI   Dressing Type Black foam Black foam   Number of Foam Pieces Used 1 1   Cycle Continuous Continuous   Target Pressure (mmHg) 125 125   Canister Changed Yes Yes       No associated orders.       Wound Cleaning and Dressings: Right great toe amp site  Showering directions: May shower with protection  Wound cleansing:  Cleanse with normal saline or wound cleanser  Wound cleaning frequency:  3 times a week  Wound product: EPIFIX skin sub applied in clinic PLEASE LEAVE IN PLACE, sorbact (green mesh), 1 black foam, vac drape  Dressing change frequency:  Change dressing 3x per week  Enzymatic agent:  Not applicable    Compression Therapy:   Spandagrip    Negative Pressure Wound Therapy:  NPWT: KCI vac therapy, black form at 125 mmHg continuous. RN to change dressing 3x/week unless indicated below    Wound Cleaning and Dressings: Right plantar foot  Showering directions: May shower with protection  Wound cleansing:  Cleanse with normal saline or wound cleanser  Wound cleaning frequency:  3 times a week  Wound product: EPIFIX placed, hydrofera transfer, gauze, vac drape  Dressing change frequency:  Change dressing 3x per week    Miscellaneous/Additional Orders:  Offloading: Cam boot(s)  Miscellaneous orders: Home health care nurse  for wound care    Care Summary:  Care Summary: Discussed Plan of Care at beside with patient. Patient verbally acknowledges understanding of all instructions and all questions were answered.    Follow Up:  Return in about 1 week (around 6/26/2024) for Wound followup.    SOBIA RN change dressings Mondays and Fridays.

## 2018-08-16 NOTE — DISCHARGE PLANNING
Received Stipulation and Order from the court continuing pt until 8/23/18. Sent copy to unit LSW.

## 2018-08-16 NOTE — CARE PLAN
Problem: Safety  Goal: Will remain free from injury  Outcome: PROGRESSING AS EXPECTED  Legal hold safety sitter 1:1.  The patient's trays come with plastic utensils, bed is in the locked and lowest position.  Call light is within reach, bed side table is within reach.       Problem: Mobility  Goal: Risk for activity intolerance will decrease  Outcome: PROGRESSING AS EXPECTED  Patient encouraged to ambulate around the hallways as tolerated.

## 2018-08-16 NOTE — PROGRESS NOTES
[facility] Hospitalist Progress Note    Date of Service: 2018    Chief Complaint  62 y.o. male admitted 2018 with DM, HTN, HLD who presented with right foot osteomyelitis s/p 4th toe amputation . With suicidal ideation.    Interval Problem Update  Glucose improved  He is withdrawn today. Denies pain    Consultants/Specialty  ID  Psych  Ortho - [facility]    Disposition  Legal hold  Wound care        Review of Systems   Unable to perform ROS: Psychiatric disorder   Cardiovascular: Negative for chest pain.   Gastrointestinal: Negative for abdominal pain.   Musculoskeletal: Negative for joint pain.      Physical Exam  Laboratory/Imaging   Hemodynamics  Temp (24hrs), Av.9 °C (98.4 °F), Min:36.3 °C (97.4 °F), Max:37.5 °C (99.5 °F)   Temperature: 36.3 °C (97.4 °F)  Pulse  Av.4  Min: 60  Max: 122    Blood Pressure: 135/96      Respiratory      Respiration: 18, Pulse Oximetry: 96 %        RUL Breath Sounds: Clear, RML Breath Sounds: Diminished, RLL Breath Sounds: Diminished, STEPH Breath Sounds: Clear, LLL Breath Sounds: Diminished    Fluids    Intake/Output Summary (Last 24 hours) at 18 1205  Last data filed at 18 0800   Gross per 24 hour   Intake             1480 ml   Output              400 ml   Net             1080 ml       Nutrition  Orders Placed This Encounter   Procedures   • Diet Order Diabetic     Standing Status:   Standing     Number of Occurrences:   1     Order Specific Question:   Diet:     Answer:   Diabetic [3]     Physical Exam   Constitutional: He is oriented to person, place, and time. He appears well-developed and well-nourished. He is cooperative.   HENT:   Head: Normocephalic and atraumatic.   Eyes: Conjunctivae are normal. Right eye exhibits no discharge. Left eye exhibits no discharge.   Cardiovascular: Normal rate and regular rhythm.    Pulmonary/Chest: Effort normal and breath sounds normal. He has no wheezes. He has no rales.   Abdominal: Soft. There is no tenderness.  "There is no rebound and no guarding.   Musculoskeletal:   Right foot in clean and intact dressings   Neurological: He is alert and oriented to person, place, and time.   Skin: Skin is warm and dry.   Psychiatric: He is withdrawn.   Nursing note and vitals reviewed.      Recent Labs      08/14/18   0505  08/15/18   0531   WBC  8.2  7.9   RBC  4.49*  4.46*   HEMOGLOBIN  12.6*  12.4*   HEMATOCRIT  37.5*  37.7*   MCV  83.5  84.5   MCH  28.1  27.8   MCHC  33.6*  32.9*   RDW  39.5  40.4   PLATELETCT  369  374   MPV  9.1  9.0     Recent Labs      08/14/18   0505  08/15/18   0531  08/16/18   0459   SODIUM  139  140  138   POTASSIUM  3.5*  3.8  3.8   CHLORIDE  111  110  111   CO2  21  22  21   GLUCOSE  129*  167*  171*   BUN  13  12  13   CREATININE  0.77  0.87  0.74   CALCIUM  8.7  8.7  8.7                      Assessment/Plan     * Osteomyelitis due to type 2 diabetes mellitus (HCC)   Assessment & Plan    - ID and ortho consulted, recs appreciated  -  limb preservation evaluation completed  - MRI of right foot showing 4th and 5th toe osteomyelitis, septic arthritis, and cellulitis  - 8/12 right 4th toe amputation done  - will get PT/OT once cleared by ortho, don't anticipate significant needs  - Wound Cx + group b strep, strep viridans;  Cont unasyn, longterm antibiotics per ID          Uncontrolled diabetes mellitus (HCC)   Assessment & Plan    Hyperglycemia  - cont lantus 30U qPM, ISS, hypoglycemic protocol, diabetic diet  - A1c is 12  - diabetes education  - restarted home metformin        Hypertension- (present on admission)   Assessment & Plan    continue Prinivil and amlodipine          Suicidal ideation   Assessment & Plan    - pt placed on legal hold 8/11 when trying to leave AMA and his plan for worsening infection was to \"kill himself\" and saying he has plenty of guns at home  - psych following            Hyperlipidemia- (present on admission)   Assessment & Plan    continue home Lipitor.          Quality-Core " Measures   Reviewed items::  EKG reviewed, Labs reviewed, Medications reviewed and Radiology images reviewed  Saavedra catheter::  No Saavedra  DVT prophylaxis pharmacological::  Enoxaparin (Lovenox)

## 2018-08-16 NOTE — CARE PLAN
Problem: Safety  Goal: Will remain free from injury  Outcome: PROGRESSING AS EXPECTED  1:1 sitter present

## 2018-08-16 NOTE — PROGRESS NOTES
Infectious Disease Progress Note    Author: Lianet Simpson M.D. Date & Time of service: 2018  3:45 PM    Chief Complaint:  Follow-up for right diabetic foot infection    Interval History:   AF WBC 9.7 patient denies any complaints however is a poor historian given paranoid schizophrenia, patient threatened to leave AMA   AF no CBC no new issues to report the patient is a poor historian, sitter at bedside as patient stated he wanted to kill himself yesterday, plan for IND with possible toe amputation today  2018-T-max 99.  No new issues overnight.  Underwent surgery this morning  2018-T-max 98.3.  No new issues overnight.  8/15/2018-T-max 98.  Wants to go home and apparently has been refusing PICC .  2018-T-max 99.5.  I had a long conversation with patient but it is unclear how much registers.  WBC 8 platelets 374 creatinine 0.74  Labs Reviewed, Medications Reviewed, Radiology Reviewed and Wound Reviewed.    Review of Systems:  Review of Systems   Unable to perform ROS: Psychiatric disorder       Hemodynamics:  Temp (24hrs), Av.9 °C (98.4 °F), Min:36.3 °C (97.4 °F), Max:37.5 °C (99.5 °F)  Temperature: 36.3 °C (97.4 °F)  Pulse  Av.4  Min: 60  Max: 122   Blood Pressure: 135/96       Physical Exam:  Physical Exam   Constitutional: He appears well-developed.   Disheveled   HENT:   Head: Normocephalic and atraumatic.   Eyes: Pupils are equal, round, and reactive to light. EOM are normal.   Poor eye contact   Neck: Neck supple.   Cardiovascular: Normal rate.    Irregular   Pulmonary/Chest: Effort normal and breath sounds normal.   Abdominal: Soft. There is no tenderness.   Musculoskeletal:   Refer to the wound care pictures and note   Neurological: He is alert.   Psychiatric:   Flat and odd affect       Meds:    Current Facility-Administered Medications:   •  metFORMIN  •  enoxaparin (LOVENOX) injection  •  ampicillin-sulbactam (UNASYN) IV  •  ARIPiprazole  •  amLODIPine  •   lisinopril  •  atorvastatin  •  senna-docusate **AND** polyethylene glycol/lytes **AND** magnesium hydroxide **AND** bisacodyl  •  NS  •  acetaminophen  •  insulin regular **AND** Accu-Chek ACHS **AND** NOTIFY MD and PharmD **AND** glucose 4 g **AND** dextrose 50%  •  ondansetron  •  ondansetron  •  promethazine  •  promethazine  •  prochlorperazine  •  insulin glargine  •  labetalol    Labs:  Recent Labs      08/14/18   0505  08/15/18   0531   WBC  8.2  7.9   RBC  4.49*  4.46*   HEMOGLOBIN  12.6*  12.4*   HEMATOCRIT  37.5*  37.7*   MCV  83.5  84.5   MCH  28.1  27.8   RDW  39.5  40.4   PLATELETCT  369  374   MPV  9.1  9.0   NEUTSPOLYS  66.40  68.30   LYMPHOCYTES  18.30*  17.30*   MONOCYTES  7.30  7.00   EOSINOPHILS  6.20  5.20   BASOPHILS  0.70  0.90     Recent Labs      08/14/18   0505  08/15/18   0531  08/16/18   0459   SODIUM  139  140  138   POTASSIUM  3.5*  3.8  3.8   CHLORIDE  111  110  111   CO2  21  22  21   GLUCOSE  129*  167*  171*   BUN  13  12  13     Recent Labs      08/14/18   0505  08/15/18   0531  08/16/18   0459   CREATININE  0.77  0.87  0.74       Imaging:  Dx-foot-complete 3+ Right    Result Date: 8/9/2018 8/9/2018 6:43 PM HISTORY/REASON FOR EXAM:  Right foot pain and swelling. Multiple open wounds. TECHNIQUE/EXAM DESCRIPTION AND NUMBER OF VIEWS: 3 views of the RIGHT foot. COMPARISON:  None. FINDINGS: No acute fracture or dislocation is present. There is a moderate hallux valgus deformity present. There is soft tissue swelling surrounding the 4th and 5th toes. There is irregularity of the 5th MTP joint and the 5th IP joint without evidence of acute erosion. This may represent prior injury or inflammation. No lytic or blastic bony defect is identified to suggest acute osteomyelitis.     1.  No acute right foot fracture or dislocation. 2.  Soft tissue swelling surrounding the 4th and 5th toes. 3.  No plain film evidence of acute osteomyelitis. 4.  Degenerative or post inflammatory irregularity of  the right 5th MTP joint and IP joint. 5.  Moderate hallux valgus deformity.      Micro:  Results     Procedure Component Value Units Date/Time    CULTURE TISSUE W/ GRM STAIN [[id number]]  (Abnormal) Collected:  08/12/18 1751    Order Status:  Completed Specimen:  Tissue Updated:  08/15/18 1050     Significant Indicator POS (POS)     Source TISS     Site Right 4th Toe Tissue     Tissue Culture -- (A)     Gram Stain Result Many Gram positive cocci. (A)     Tissue Culture Streptococcus agalactiae (Group B)  Moderate growth   (A)      Viridans Streptococcus  Moderate growth   (A)    ANAEROBIC CULTURE [[id number]] Collected:  08/12/18 1751    Order Status:  Completed Specimen:  Tissue Updated:  08/15/18 1050     Significant Indicator NEG     Source TISS     Site Right 4th Toe Tissue     Anaerobic Culture, Culture Res No Anaerobes isolated.    CULTURE WOUND W/ GRAM STAIN [[id number]]  (Abnormal) Collected:  08/12/18 1749    Order Status:  Completed Specimen:  Wound Updated:  08/15/18 1048     Significant Indicator POS (POS)     Source WND     Site Right 4th Toe     Culture Result Wound -- (A)     Gram Stain Result Rare WBCs.  Few Gram positive cocci.       Culture Result Wound Streptococcus agalactiae (Group B)  Moderate growth   (A)      Viridans Streptococcus  Moderate growth   (A)    ANAEROBIC CULTURE [[id number]] Collected:  08/12/18 1749    Order Status:  Completed Specimen:  Wound Updated:  08/15/18 1048     Significant Indicator NEG     Source WND     Site Right 4th Toe     Anaerobic Culture, Culture Res No Anaerobes isolated.    GRAM STAIN [[id number]] Collected:  08/12/18 1751    Order Status:  Completed Specimen:  Tissue Updated:  08/13/18 0923     Significant Indicator .     Source TISS     Site Right 4th Toe Tissue     Gram Stain Result Many Gram positive cocci.    GRAM STAIN [[id number]] Collected:  08/12/18 1749    Order Status:  Completed Specimen:  Wound Updated:  08/13/18 0923     Significant Indicator .      Source WND     Site Right 4th Toe     Gram Stain Result Rare WBCs.  Few Gram positive cocci.      CULTURE WOUND W/O GRAM STAIN [[id number]]  (Abnormal) Collected:  08/09/18 2108    Order Status:  Completed Specimen:  Wound from Right Foot Updated:  08/11/18 1044     Significant Indicator POS (POS)     Source WND     Site RIGHT FOOT     Culture Result Wound Light growth mixed skin phoebe. (A)      Streptococcus agalactiae (Group B)  Moderate growth   (A)          Assessment:  Active Hospital Problems    Diagnosis   • *Osteomyelitis due to type 2 diabetes mellitus (HCC) [E11.69, M86.9]   • Uncontrolled diabetes mellitus (HCC) [E11.65]   • Hypertension [I10]   • Hyponatremia [E87.1]   • Hyperlipidemia [E78.5]       Plan:  Right diabetic foot infection-additional workup  No fevers  Leukocytosis resolved  X-ray negative  ESR 93  MRI pending  Wound culture+ group B streptococcus strep viridans  Continue Unasyn  Wound care  LPS following  Underwent right fourth toe amputation and right foot I&D  In view of foot abscess he will need longer antibiotics-least 4 weeks  If he refuses IV antibiotics then will change him to p.o. Augmentin   ?  SNIF placement  Discussed with patient.  He says he wants to go home  Psych follow-up    DM 2, poorly controlled  Hemoglobin A1c 12   Maintain blood sugars less than 150 to control infection and promote wound healing    Paranoid schizophrenia  Sitter at bedside  May be an impediment to his care and treatment    Discussed with internal medicine/

## 2018-08-16 NOTE — PROGRESS NOTES
LIMB PRESERVATION SERVICE     POD # 4 R 4th toe amp and R foot I&D by Dr. Giles    Dressing changed, incision well approximated, sutures intact, minimal ss drainage.  Small ulcers to plantar feet x 2    Patient is confused, on legal hold, bedside sitter at all times  CM working on transfer to [facility]    Offloading boot when ambulating      Assessment/Plan:       1. Diabetic foot infection- OM and abscess.  S/p I&D and 4th toe amp.  Incision well approximated.  Ulcers x 2 to plantar foot..   Wound care orders written.  Offloading boot when ambulating. Op cxs + for Strep.  ID managing abx.  Sutures out 3 weeks post-op.  Pt to f/u with Dr. Starks     2. Uncontrolled type 2 diabetes- A1c 12. % this admission.  Pt confused, diabetes education inappropriate at this time

## 2018-08-16 NOTE — PROGRESS NOTES
Assumed care at 1900 received report from day shift RN.    [place]&Ox4.   Flat affect   denies Pain,   denies nausea,   tolerating diabetic diet,   +voiding, + BM,  Patient pulled out IV on sheets prior to midnight  This RN tried twice to start another,  Five other RNs tried once with the last one getting a 20g on right wrist.    Patient's antibiotics given after midnight  Message sent to pharmacy to be re-timed.        Patient is a stand by assist, has a boot for his right foot.    Legal hold for SI  1:1 safety sitter       Patient call light within reach, bed in the lowest position, hourly rounding in place.

## 2018-08-16 NOTE — PROGRESS NOTES
AO x 4, flat withdrawn.  RLE dressing c/d/i, walking boot in use.  Antibiotics per mar.  Denies pain.  Patient dictated letter he wanted to write to KASSANDRA Colmenares.  Letter was then given to unit clerk to mail.  Letter was to neighbor to see if neighbor would take care of his cats who are alone and most likely out of food and water per patient.  Social work also notified on situation.  BALDOMERO Pablo also called [facility] clinic to confirm that scooter is locked up in their building and safe.  Patient expressed relief and decrease in anxiety noted.  Plan of care discussed, questions answered, verbalized understanding. Order that glasses may be at bedside recived from psychology team.

## 2018-08-16 NOTE — THERAPY
"Physical Therapy Evaluation completed.   Bed Mobility:  Supine to Sit: Supervised  Transfers: Sit to Stand: Contact Guard Assist  Gait: Level Of Assist: Stand by Assist with No Equipment Needed       Plan of Care: Will benefit from Physical Therapy 2 times per week  Discharge Recommendations: Equipment: No Equipment Needed.  Pt presents s/p R 4th toe amputation due to Osteomyelitis, right second toe. Pt tolerated ambulation with SBA as long as he is wearing shoe on opposite foot to balance leg length difference. Pt will be seen 1x more to ensure independent with donning CAM boot and  good balance when up. Notes indicate possible [facility] due to suicidal ideation.     See \"Rehab Therapy-Acute\" Patient Summary Report for complete documentation.     "

## 2018-08-17 LAB
ANION GAP SERPL CALC-SCNC: 8 MMOL/L (ref 0–11.9)
BUN SERPL-MCNC: 14 MG/DL (ref 8–22)
CALCIUM SERPL-MCNC: 8.7 MG/DL (ref 8.5–10.5)
CHLORIDE SERPL-SCNC: 111 MMOL/L (ref 96–112)
CO2 SERPL-SCNC: 23 MMOL/L (ref 20–33)
CREAT SERPL-MCNC: 0.76 MG/DL (ref 0.5–1.4)
GLUCOSE BLD-MCNC: 132 MG/DL (ref 65–99)
GLUCOSE BLD-MCNC: 150 MG/DL (ref 65–99)
GLUCOSE BLD-MCNC: 160 MG/DL (ref 65–99)
GLUCOSE SERPL-MCNC: 124 MG/DL (ref 65–99)
POTASSIUM SERPL-SCNC: 3.5 MMOL/L (ref 3.6–5.5)
SODIUM SERPL-SCNC: 142 MMOL/L (ref 135–145)

## 2018-08-17 PROCEDURE — A9270 NON-COVERED ITEM OR SERVICE: HCPCS | Performed by: INTERNAL MEDICINE

## 2018-08-17 PROCEDURE — 700111 HCHG RX REV CODE 636 W/ 250 OVERRIDE (IP): Performed by: HOSPITALIST

## 2018-08-17 PROCEDURE — 99232 SBSQ HOSP IP/OBS MODERATE 35: CPT | Performed by: INTERNAL MEDICINE

## 2018-08-17 PROCEDURE — 82962 GLUCOSE BLOOD TEST: CPT | Mod: 91

## 2018-08-17 PROCEDURE — 700102 HCHG RX REV CODE 250 W/ 637 OVERRIDE(OP): Performed by: HOSPITALIST

## 2018-08-17 PROCEDURE — A9270 NON-COVERED ITEM OR SERVICE: HCPCS | Performed by: HOSPITALIST

## 2018-08-17 PROCEDURE — 700102 HCHG RX REV CODE 250 W/ 637 OVERRIDE(OP): Performed by: INTERNAL MEDICINE

## 2018-08-17 PROCEDURE — 700105 HCHG RX REV CODE 258: Performed by: HOSPITALIST

## 2018-08-17 PROCEDURE — 700102 HCHG RX REV CODE 250 W/ 637 OVERRIDE(OP): Performed by: PSYCHIATRY & NEUROLOGY

## 2018-08-17 PROCEDURE — A9270 NON-COVERED ITEM OR SERVICE: HCPCS | Performed by: PSYCHIATRY & NEUROLOGY

## 2018-08-17 PROCEDURE — 770001 HCHG ROOM/CARE - MED/SURG/GYN PRIV*

## 2018-08-17 PROCEDURE — 36415 COLL VENOUS BLD VENIPUNCTURE: CPT

## 2018-08-17 PROCEDURE — 80048 BASIC METABOLIC PNL TOTAL CA: CPT

## 2018-08-17 PROCEDURE — 700111 HCHG RX REV CODE 636 W/ 250 OVERRIDE (IP): Performed by: INTERNAL MEDICINE

## 2018-08-17 RX ORDER — POTASSIUM CHLORIDE 20 MEQ/1
40 TABLET, EXTENDED RELEASE ORAL ONCE
Status: COMPLETED | OUTPATIENT
Start: 2018-08-17 | End: 2018-08-17

## 2018-08-17 RX ADMIN — AMPICILLIN AND SULBACTAM 3 G: 2; 1 INJECTION, POWDER, FOR SOLUTION INTRAVENOUS at 07:54

## 2018-08-17 RX ADMIN — AMPICILLIN AND SULBACTAM 3 G: 2; 1 INJECTION, POWDER, FOR SOLUTION INTRAVENOUS at 16:20

## 2018-08-17 RX ADMIN — METFORMIN HYDROCHLORIDE 850 MG: 850 TABLET ORAL at 16:29

## 2018-08-17 RX ADMIN — METFORMIN HYDROCHLORIDE 850 MG: 850 TABLET ORAL at 11:09

## 2018-08-17 RX ADMIN — AMPICILLIN AND SULBACTAM 3 G: 2; 1 INJECTION, POWDER, FOR SOLUTION INTRAVENOUS at 04:18

## 2018-08-17 RX ADMIN — METFORMIN HYDROCHLORIDE 850 MG: 850 TABLET ORAL at 07:54

## 2018-08-17 RX ADMIN — ATORVASTATIN CALCIUM 20 MG: 20 TABLET, FILM COATED ORAL at 06:10

## 2018-08-17 RX ADMIN — AMPICILLIN AND SULBACTAM 3 G: 2; 1 INJECTION, POWDER, FOR SOLUTION INTRAVENOUS at 21:56

## 2018-08-17 RX ADMIN — ARIPIPRAZOLE 5 MG: 10 TABLET ORAL at 06:10

## 2018-08-17 RX ADMIN — POTASSIUM CHLORIDE 40 MEQ: 1500 TABLET, EXTENDED RELEASE ORAL at 09:51

## 2018-08-17 RX ADMIN — LISINOPRIL 40 MG: 20 TABLET ORAL at 06:10

## 2018-08-17 RX ADMIN — ENOXAPARIN SODIUM 40 MG: 100 INJECTION SUBCUTANEOUS at 06:12

## 2018-08-17 RX ADMIN — Medication 2 TABLET: at 06:10

## 2018-08-17 RX ADMIN — AMLODIPINE BESYLATE 5 MG: 10 TABLET ORAL at 06:10

## 2018-08-17 RX ADMIN — INSULIN HUMAN 2 UNITS: 100 INJECTION, SOLUTION PARENTERAL at 21:58

## 2018-08-17 ASSESSMENT — PAIN SCALES - GENERAL
PAINLEVEL_OUTOF10: 0
PAINLEVEL_OUTOF10: 0

## 2018-08-17 ASSESSMENT — ENCOUNTER SYMPTOMS
SHORTNESS OF BREATH: 0
FEVER: 0
ABDOMINAL PAIN: 0

## 2018-08-17 NOTE — CARE PLAN
Problem: Safety  Goal: Will remain free from falls  Outcome: PROGRESSING AS EXPECTED      Problem: Pain Management  Goal: Pain level will decrease to patient's comfort goal  Outcome: PROGRESSING AS EXPECTED  Pain denies pain

## 2018-08-17 NOTE — CARE PLAN
Problem: Safety  Goal: Will remain free from injury  Outcome: PROGRESSING SLOWER THAN EXPECTED  Pt is on a legal hold. 1:1 sitter present at bedside at all times to monitor pt's safety.   Goal: Will remain free from falls  Outcome: PROGRESSING AS EXPECTED    Intervention: Assess risk factors for falls   08/17/18 0051   OTHER   Fall Risk High Risk to Fall - 2 or more points    Risk for Injury-Any positive answers results in the pt being at high risk for fall related injury Not Applicable   Mobility Status Assessment 1-1 Healthcare Provider Required for Assistance with Ambulation & Transfer   History of fall 0   Pt Calls for Assistance (1:1 sitter at bedside)     Intervention: Implement fall precautions   08/16/18 2000 08/17/18 0051   OTHER   Environmental Precautions Treaded Slipper Socks on Patient;Personal Belongings, Wastebasket, Call Bell etc. in Easy Reach;Report Given to Other Health Care Providers Regarding Fall Risk;Bed in Low Position;Communication Sign for Patients & Families;Mobility Assessed & Appropriate Sign Placed --    IV Pole on Same Side of Bed as Bathroom --  Yes   Bedrails --  Bedrails Closest to Bathroom Down         Problem: Venous Thromboembolism (VTW)/Deep Vein Thrombosis (DVT) Prevention:  Goal: Patient will participate in Venous Thrombosis (VTE)/Deep Vein Thrombosis (DVT)Prevention Measures  Outcome: PROGRESSING AS EXPECTED   08/16/18 2000   OTHER   Risk Assessment Score 2   VTE RISK Moderate   Pharmacologic Prophylaxis Used LMWH: Enoxaparin(Lovenox)

## 2018-08-17 NOTE — PROGRESS NOTES
[facility] Hospitalist Progress Note    Date of Service: 2018    Chief Complaint  62 y.o. male admitted 2018 with DM, HTN, HLD who presented with right foot osteomyelitis s/p 4th toe amputation . With suicidal ideation.    Interval Problem Update  HypoK, repleting  He says his mood is good, denies suicidal ideation  Ambulating some, no pain    Consultants/Specialty  ID  Psych  Ortho - [facility]    Disposition  Legal hold  Wound care        Review of Systems   Constitutional: Negative for fever.   Respiratory: Negative for shortness of breath.    Cardiovascular: Negative for chest pain.   Gastrointestinal: Negative for abdominal pain.   Musculoskeletal: Negative for joint pain.   Psychiatric/Behavioral: Negative for suicidal ideas.      Physical Exam  Laboratory/Imaging   Hemodynamics  Temp (24hrs), Av.8 °C (98.2 °F), Min:36.6 °C (97.8 °F), Max:36.9 °C (98.5 °F)   Temperature: 36.9 °C (98.4 °F)  Pulse  Av.1  Min: 60  Max: 122    Blood Pressure: 147/97      Respiratory      Respiration: 18, Pulse Oximetry: 93 %        RUL Breath Sounds: Clear, RML Breath Sounds: Diminished, RLL Breath Sounds: Diminished, STEPH Breath Sounds: Clear, LLL Breath Sounds: Diminished    Fluids    Intake/Output Summary (Last 24 hours) at 18 1559  Last data filed at 18 0757   Gross per 24 hour   Intake              480 ml   Output              450 ml   Net               30 ml       Nutrition  Orders Placed This Encounter   Procedures   • Diet Order Diabetic     Standing Status:   Standing     Number of Occurrences:   1     Order Specific Question:   Diet:     Answer:   Diabetic [3]     Physical Exam   Constitutional: He is oriented to person, place, and time. He appears well-developed and well-nourished. He is cooperative.   HENT:   Head: Normocephalic and atraumatic.   Eyes: Conjunctivae are normal. Right eye exhibits no discharge. Left eye exhibits no discharge.   Cardiovascular: Normal rate and regular rhythm.   "  Pulmonary/Chest: Effort normal and breath sounds normal. He has no wheezes. He has no rales.   Abdominal: Soft. There is no tenderness. There is no rebound and no guarding.   Musculoskeletal:   Right foot in clean and intact dressings   Neurological: He is alert and oriented to person, place, and time.   Skin: Skin is warm and dry.   Psychiatric: He is withdrawn. He expresses no suicidal ideation.   Nursing note and vitals reviewed.      Recent Labs      08/15/18   0531   WBC  7.9   RBC  4.46*   HEMOGLOBIN  12.4*   HEMATOCRIT  37.7*   MCV  84.5   MCH  27.8   MCHC  32.9*   RDW  40.4   PLATELETCT  374   MPV  9.0     Recent Labs      08/15/18   0531  08/16/18   0459  08/17/18   0409   SODIUM  140  138  142   POTASSIUM  3.8  3.8  3.5*   CHLORIDE  110  111  111   CO2  22  21  23   GLUCOSE  167*  171*  124*   BUN  12  13  14   CREATININE  0.87  0.74  0.76   CALCIUM  8.7  8.7  8.7                      Assessment/Plan     * Osteomyelitis due to type 2 diabetes mellitus (HCC)   Assessment & Plan    - ID and ortho consulted, recs appreciated  -  limb preservation evaluation completed  - MRI of right foot showing 4th and 5th toe osteomyelitis, septic arthritis, and cellulitis  - 8/12 right 4th toe amputation done  - will get PT/OT once cleared by ortho, don't anticipate significant needs  - Wound Cx + group b strep, strep viridans;  Cont unasyn, longterm antibiotics per ID          Uncontrolled diabetes mellitus (HCC)   Assessment & Plan    Hyperglycemia  - cont lantus 30U qPM, ISS, hypoglycemic protocol, diabetic diet  - A1c is 12  - diabetes education  - restarted home metformin        Hypertension- (present on admission)   Assessment & Plan    continue Prinivil and amlodipine          Suicidal ideation   Assessment & Plan    - pt placed on legal hold 8/11 when trying to leave AMA and his plan for worsening infection was to \"kill himself\" and saying he has plenty of guns at home  - psych following          "   Hyperlipidemia- (present on admission)   Assessment & Plan    continue home Lipitor.          Quality-Core Measures   Reviewed items::  EKG reviewed, Labs reviewed, Medications reviewed and Radiology images reviewed  Saavedra catheter::  No Saavedra  DVT prophylaxis pharmacological::  Enoxaparin (Lovenox)

## 2018-08-17 NOTE — PROGRESS NOTES
Infectious Disease Progress Note    Author: Lianet Simpson M.D. Date & Time of service: 2018  3:50 PM    Chief Complaint:  Follow-up for right diabetic foot infection    Interval History:   AF WBC 9.7 patient denies any complaints however is a poor historian given paranoid schizophrenia, patient threatened to leave AMA   AF no CBC no new issues to report the patient is a poor historian, sitter at bedside as patient stated he wanted to kill himself yesterday, plan for IND with possible toe amputation today  2018-T-max 99.  No new issues overnight.  Underwent surgery this morning  2018-T-max 98.3.  No new issues overnight.  8/15/2018-T-max 98.  Wants to go home and apparently has been refusing PICC .  2018-T-max 99.5.  I had a long conversation with patient but it is unclear how much registers.  WBC 8 platelets 374 creatinine 0.74  2018 T-max 98.5.  Patient says he does not want to go to snf.  Still on one-to-one observation  Labs Reviewed, Medications Reviewed, Radiology Reviewed and Wound Reviewed.    Review of Systems:  Review of Systems   Unable to perform ROS: Psychiatric disorder       Hemodynamics:  Temp (24hrs), Av.8 °C (98.2 °F), Min:36.6 °C (97.8 °F), Max:36.9 °C (98.5 °F)  Temperature: 36.9 °C (98.4 °F)  Pulse  Av.1  Min: 60  Max: 122   Blood Pressure: 147/97       Physical Exam:  Physical Exam   Constitutional: He appears well-developed.   Disheveled   HENT:   Head: Normocephalic and atraumatic.   Eyes: Pupils are equal, round, and reactive to light. EOM are normal.   Poor eye contact   Neck: Neck supple.   Cardiovascular: Normal rate.    Irregular   Pulmonary/Chest: Effort normal and breath sounds normal.   Abdominal: Soft. There is no tenderness.   Musculoskeletal:   Refer to the wound care pictures and note   Neurological: He is alert.   Psychiatric:   Flat and odd affect       Meds:    Current Facility-Administered Medications:   •  metFORMIN  •  enoxaparin  (LOVENOX) injection  •  ampicillin-sulbactam (UNASYN) IV  •  ARIPiprazole  •  amLODIPine  •  lisinopril  •  atorvastatin  •  senna-docusate **AND** polyethylene glycol/lytes **AND** magnesium hydroxide **AND** bisacodyl  •  NS  •  acetaminophen  •  insulin regular **AND** Accu-Chek ACHS **AND** NOTIFY MD and PharmD **AND** glucose 4 g **AND** dextrose 50%  •  ondansetron  •  ondansetron  •  promethazine  •  promethazine  •  prochlorperazine  •  insulin glargine  •  labetalol    Labs:  Recent Labs      08/15/18   0531   WBC  7.9   RBC  4.46*   HEMOGLOBIN  12.4*   HEMATOCRIT  37.7*   MCV  84.5   MCH  27.8   RDW  40.4   PLATELETCT  374   MPV  9.0   NEUTSPOLYS  68.30   LYMPHOCYTES  17.30*   MONOCYTES  7.00   EOSINOPHILS  5.20   BASOPHILS  0.90     Recent Labs      08/15/18   0531  08/16/18   0459  08/17/18   0409   SODIUM  140  138  142   POTASSIUM  3.8  3.8  3.5*   CHLORIDE  110  111  111   CO2  22  21  23   GLUCOSE  167*  171*  124*   BUN  12  13  14     Recent Labs      08/15/18   0531  08/16/18   0459  08/17/18   0409   CREATININE  0.87  0.74  0.76       Imaging:  Dx-foot-complete 3+ Right    Result Date: 8/9/2018 8/9/2018 6:43 PM HISTORY/REASON FOR EXAM:  Right foot pain and swelling. Multiple open wounds. TECHNIQUE/EXAM DESCRIPTION AND NUMBER OF VIEWS: 3 views of the RIGHT foot. COMPARISON:  None. FINDINGS: No acute fracture or dislocation is present. There is a moderate hallux valgus deformity present. There is soft tissue swelling surrounding the 4th and 5th toes. There is irregularity of the 5th MTP joint and the 5th IP joint without evidence of acute erosion. This may represent prior injury or inflammation. No lytic or blastic bony defect is identified to suggest acute osteomyelitis.     1.  No acute right foot fracture or dislocation. 2.  Soft tissue swelling surrounding the 4th and 5th toes. 3.  No plain film evidence of acute osteomyelitis. 4.  Degenerative or post inflammatory irregularity of the right  5th MTP joint and IP joint. 5.  Moderate hallux valgus deformity.      Micro:  Results     Procedure Component Value Units Date/Time    CULTURE TISSUE W/ GRM STAIN [[id number]]  (Abnormal) Collected:  08/12/18 1751    Order Status:  Completed Specimen:  Tissue Updated:  08/15/18 1050     Significant Indicator POS (POS)     Source TISS     Site Right 4th Toe Tissue     Tissue Culture -- (A)     Gram Stain Result Many Gram positive cocci. (A)     Tissue Culture Streptococcus agalactiae (Group B)  Moderate growth   (A)      Viridans Streptococcus  Moderate growth   (A)    ANAEROBIC CULTURE [[id number]] Collected:  08/12/18 1751    Order Status:  Completed Specimen:  Tissue Updated:  08/15/18 1050     Significant Indicator NEG     Source TISS     Site Right 4th Toe Tissue     Anaerobic Culture, Culture Res No Anaerobes isolated.    CULTURE WOUND W/ GRAM STAIN [[id number]]  (Abnormal) Collected:  08/12/18 1749    Order Status:  Completed Specimen:  Wound Updated:  08/15/18 1048     Significant Indicator POS (POS)     Source WND     Site Right 4th Toe     Culture Result Wound -- (A)     Gram Stain Result Rare WBCs.  Few Gram positive cocci.       Culture Result Wound Streptococcus agalactiae (Group B)  Moderate growth   (A)      Viridans Streptococcus  Moderate growth   (A)    ANAEROBIC CULTURE [[id number]] Collected:  08/12/18 1749    Order Status:  Completed Specimen:  Wound Updated:  08/15/18 1048     Significant Indicator NEG     Source WND     Site Right 4th Toe     Anaerobic Culture, Culture Res No Anaerobes isolated.    GRAM STAIN [[id number]] Collected:  08/12/18 1751    Order Status:  Completed Specimen:  Tissue Updated:  08/13/18 0923     Significant Indicator .     Source TISS     Site Right 4th Toe Tissue     Gram Stain Result Many Gram positive cocci.    GRAM STAIN [[id number]] Collected:  08/12/18 1749    Order Status:  Completed Specimen:  Wound Updated:  08/13/18 0923     Significant Indicator .     Source WND      Site Right 4th Toe     Gram Stain Result Rare WBCs.  Few Gram positive cocci.      CULTURE WOUND W/O GRAM STAIN [[id number]]  (Abnormal) Collected:  08/09/18 2103    Order Status:  Completed Specimen:  Wound from Right Foot Updated:  08/11/18 1044     Significant Indicator POS (POS)     Source WND     Site RIGHT FOOT     Culture Result Wound Light growth mixed skin phoebe. (A)      Streptococcus agalactiae (Group B)  Moderate growth   (A)          Assessment:  Active Hospital Problems    Diagnosis   • *Osteomyelitis due to type 2 diabetes mellitus (HCC) [E11.69, M86.9]   • Uncontrolled diabetes mellitus (HCC) [E11.65]   • Hypertension [I10]   • Hyponatremia [E87.1]   • Hyperlipidemia [E78.5]       Plan:  Right diabetic foot infection-additional workup  No fevers  Leukocytosis resolved  X-ray negative  ESR 93  MRI pending  Wound culture+ group B streptococcus strep viridans  Continue Unasyn  Wound care  LPS following  Underwent right fourth toe amputation and right foot I&D on 8/12/2018  In view of foot abscess he will need longer antibiotics-least 4 weeks through 9/10/2018  If he refuses IV antibiotics then will change him to p.o. Augmentin   ?  SNIF placement  Discussed with patient.  He says he wants to go home  Psych follow-up      DM 2, poorly controlled  Hemoglobin A1c 12   Maintain blood sugars less than 150 to control infection and promote wound healing    Paranoid schizophrenia  Sitter at bedside  May be an impediment to his care and treatment    Discussed with internal medicine/

## 2018-08-17 NOTE — PROGRESS NOTES
Ermias Ricks came by from [facility] healthcare delivery to speak with social work about helping place patient in psych facility. He said he could help with placement    [phone] ext [phone]

## 2018-08-17 NOTE — PROGRESS NOTES
Patient finally willing to walk halls. Patient being walked with sitter around the unit presently. Walking boot on

## 2018-08-17 NOTE — CONSULTS
Diabetes education: Order received for diabetes education. Pt remains confused and education not appropriate at this time. Please call [phone] if needs change.

## 2018-08-17 NOTE — PROGRESS NOTES
"Pt A&O x4. Quiet, subdued, withdrawn, one-word responses. Pt is on a legal hold, 1:1 sitter present at bedside.     Vitals: /94   Pulse 82   Temp 36.6 °C (97.8 °F)   Resp 17   Ht 1.803 m (5' 10.98\")   Wt 98.8 kg (217 lb 13 oz)   SpO2 94%   BMI 30.39 kg/m²     Denies having any pain. Declines pain interventions.     Cardiac: Denies new onset of chest pain.    Vascular: Pulses 2+ BUE, BLE. Generalized/ dependant edema noted to RLE foot/ surgical site.    Respiratory: Lungs sound diminished in bases. Refused to use IS. On RA. Denies SOB.    GI: Abdomen soft. On diabetic diet, tolerating well. - nausea/ vomiting.    : Pt voiding adequately.      MSK: Pt up to bathroom x1 assist. Pt has offloading boot for right foot when ambulatory.     Integumentary: Right foot toe amputation surgical site dressing CDI, observed. Dressing changed on previous shift, to be changed Q48 hrs.     Labs noted. FSBG's ACHS slightly elevated, treating with insulin sliding scale per MAR.     Fall precautions in place: Bed locked in lowest position, Upper bed rails up, personal belongings within reach, call light within reach, appropriate mobility signs in place, - bed alarm. Pt calls appropriately.     Pt updated on POC.       "

## 2018-08-17 NOTE — PROGRESS NOTES
Patient encouraged to ambulate with boot. Patient educated about the benefits of movement and getting used to the boot. Patient still refused

## 2018-08-17 NOTE — PROGRESS NOTES
LEGAL HOLD PATIENT  1:1 Sitter     AOX4. Withdrawn and flat affect noted.  ALEX with a tingling noted to BLE. A generalized weakness is noted.  RLE generalized edema noted  Tolerating diabetic diet well. Denies N/V  Patient had amputation to 4th  toe on RLE. Dressing in place is C/D/I  Tolerating RA. Denies SOB  Patient has a boot to help with mobilization. SBA tolerates well  IV running TKO fluids  POC discussed with patient. No acute distresses noted otherwise at this time. Bed in lowest and locked position. Call light within reach. Will continue to monitor.

## 2018-08-18 LAB
ANION GAP SERPL CALC-SCNC: 7 MMOL/L (ref 0–11.9)
BUN SERPL-MCNC: 12 MG/DL (ref 8–22)
CALCIUM SERPL-MCNC: 8.9 MG/DL (ref 8.5–10.5)
CHLORIDE SERPL-SCNC: 110 MMOL/L (ref 96–112)
CO2 SERPL-SCNC: 23 MMOL/L (ref 20–33)
CREAT SERPL-MCNC: 0.82 MG/DL (ref 0.5–1.4)
GLUCOSE BLD-MCNC: 119 MG/DL (ref 65–99)
GLUCOSE BLD-MCNC: 137 MG/DL (ref 65–99)
GLUCOSE BLD-MCNC: 137 MG/DL (ref 65–99)
GLUCOSE BLD-MCNC: 153 MG/DL (ref 65–99)
GLUCOSE BLD-MCNC: 167 MG/DL (ref 65–99)
GLUCOSE SERPL-MCNC: 138 MG/DL (ref 65–99)
MAGNESIUM SERPL-MCNC: 1.6 MG/DL (ref 1.5–2.5)
POTASSIUM SERPL-SCNC: 3.8 MMOL/L (ref 3.6–5.5)
SODIUM SERPL-SCNC: 140 MMOL/L (ref 135–145)

## 2018-08-18 PROCEDURE — 700111 HCHG RX REV CODE 636 W/ 250 OVERRIDE (IP): Performed by: INTERNAL MEDICINE

## 2018-08-18 PROCEDURE — 700102 HCHG RX REV CODE 250 W/ 637 OVERRIDE(OP): Performed by: INTERNAL MEDICINE

## 2018-08-18 PROCEDURE — 82962 GLUCOSE BLOOD TEST: CPT | Mod: 91

## 2018-08-18 PROCEDURE — A9270 NON-COVERED ITEM OR SERVICE: HCPCS | Performed by: INTERNAL MEDICINE

## 2018-08-18 PROCEDURE — A9270 NON-COVERED ITEM OR SERVICE: HCPCS | Performed by: HOSPITALIST

## 2018-08-18 PROCEDURE — 83735 ASSAY OF MAGNESIUM: CPT

## 2018-08-18 PROCEDURE — 99232 SBSQ HOSP IP/OBS MODERATE 35: CPT | Performed by: INTERNAL MEDICINE

## 2018-08-18 PROCEDURE — 700102 HCHG RX REV CODE 250 W/ 637 OVERRIDE(OP): Performed by: HOSPITALIST

## 2018-08-18 PROCEDURE — 770001 HCHG ROOM/CARE - MED/SURG/GYN PRIV*

## 2018-08-18 PROCEDURE — 80048 BASIC METABOLIC PNL TOTAL CA: CPT

## 2018-08-18 PROCEDURE — 36415 COLL VENOUS BLD VENIPUNCTURE: CPT

## 2018-08-18 PROCEDURE — 700111 HCHG RX REV CODE 636 W/ 250 OVERRIDE (IP): Performed by: HOSPITALIST

## 2018-08-18 PROCEDURE — A9270 NON-COVERED ITEM OR SERVICE: HCPCS | Performed by: PSYCHIATRY & NEUROLOGY

## 2018-08-18 PROCEDURE — 700102 HCHG RX REV CODE 250 W/ 637 OVERRIDE(OP): Performed by: PSYCHIATRY & NEUROLOGY

## 2018-08-18 PROCEDURE — 700105 HCHG RX REV CODE 258: Performed by: HOSPITALIST

## 2018-08-18 RX ORDER — SODIUM CHLORIDE 9 MG/ML
INJECTION, SOLUTION INTRAVENOUS
Status: COMPLETED
Start: 2018-08-18 | End: 2018-08-18

## 2018-08-18 RX ADMIN — ATORVASTATIN CALCIUM 20 MG: 20 TABLET, FILM COATED ORAL at 06:41

## 2018-08-18 RX ADMIN — AMPICILLIN AND SULBACTAM 3 G: 2; 1 INJECTION, POWDER, FOR SOLUTION INTRAVENOUS at 22:07

## 2018-08-18 RX ADMIN — METFORMIN HYDROCHLORIDE 850 MG: 850 TABLET ORAL at 17:57

## 2018-08-18 RX ADMIN — ARIPIPRAZOLE 5 MG: 10 TABLET ORAL at 06:40

## 2018-08-18 RX ADMIN — METFORMIN HYDROCHLORIDE 850 MG: 850 TABLET ORAL at 09:12

## 2018-08-18 RX ADMIN — AMPICILLIN AND SULBACTAM 3 G: 2; 1 INJECTION, POWDER, FOR SOLUTION INTRAVENOUS at 09:12

## 2018-08-18 RX ADMIN — METFORMIN HYDROCHLORIDE 850 MG: 850 TABLET ORAL at 12:08

## 2018-08-18 RX ADMIN — Medication 2 TABLET: at 06:40

## 2018-08-18 RX ADMIN — AMPICILLIN AND SULBACTAM 3 G: 2; 1 INJECTION, POWDER, FOR SOLUTION INTRAVENOUS at 16:36

## 2018-08-18 RX ADMIN — ENOXAPARIN SODIUM 40 MG: 100 INJECTION SUBCUTANEOUS at 06:40

## 2018-08-18 RX ADMIN — AMLODIPINE BESYLATE 5 MG: 10 TABLET ORAL at 06:41

## 2018-08-18 RX ADMIN — AMPICILLIN AND SULBACTAM 3 G: 2; 1 INJECTION, POWDER, FOR SOLUTION INTRAVENOUS at 04:27

## 2018-08-18 RX ADMIN — INSULIN GLARGINE 30 UNITS: 100 INJECTION, SOLUTION SUBCUTANEOUS at 17:56

## 2018-08-18 RX ADMIN — LISINOPRIL 40 MG: 20 TABLET ORAL at 06:40

## 2018-08-18 ASSESSMENT — PAIN SCALES - GENERAL: PAINLEVEL_OUTOF10: 0

## 2018-08-18 ASSESSMENT — ENCOUNTER SYMPTOMS
FEVER: 0
WEAKNESS: 0
SHORTNESS OF BREATH: 0
CONSTIPATION: 0
ABDOMINAL PAIN: 0

## 2018-08-18 NOTE — PROGRESS NOTES
Pt continuing to show SI, pt is withdrawn with flat affect     1:1 sitter continuing     VSS     Pt does well with Yes / No questions, when asked more extensive questions pt stares blankly. Unable to assess education effectiveness as pt does not respond, pt turns over in bed and pulls covers over body

## 2018-08-18 NOTE — PROGRESS NOTES
"/88   Pulse 83   Temp 36.8 °C (98.3 °F)   Resp 18   Ht 1.803 m (5' 10.98\")   Wt 98.8 kg (217 lb 13 oz)   SpO2 96%   BMI 30.39 kg/m²     Patient is A&Ox4.   Denies pain.   ALEX, CMS intact, reports baseline numbness and tingling to right foot.   Mobilizes with SBA, educated to call for assistance.   On RA, denies SOB, chest pain.   Normoactive BS x 4. Tolerating diabetic diet. Denies nausea/vomiting.   + flatus, + BM. Pt is voiding.   Right foot covered in dressing at this time, c/d/i.   PIV running TKO  Updated on plan of care. Sitter at bedside.   "

## 2018-08-18 NOTE — PROGRESS NOTES
[facility] Hospitalist Progress Note    Date of Service: 2018    Chief Complaint  62 y.o. male admitted 2018 with DM, HTN, HLD who presented with right foot osteomyelitis s/p 4th toe amputation . With suicidal ideation.    Interval Problem Update  Hs no complaints, no acute issues. Says his mood is good.    Consultants/Specialty  ID  Psych  Ortho - [facility]    Disposition  Legal hold  Wound care        Review of Systems   Constitutional: Negative for fever.   Respiratory: Negative for shortness of breath.    Cardiovascular: Negative for chest pain.   Gastrointestinal: Negative for abdominal pain and constipation.   Musculoskeletal: Negative for joint pain.   Neurological: Negative for weakness.   Psychiatric/Behavioral: Negative for suicidal ideas.      Physical Exam  Laboratory/Imaging   Hemodynamics  Temp (24hrs), Av.9 °C (98.4 °F), Min:36.8 °C (98.3 °F), Max:36.9 °C (98.5 °F)   Temperature: 36.9 °C (98.4 °F)  Pulse  Av.6  Min: 60  Max: 122    Blood Pressure: 134/80      Respiratory      Respiration: 18, Pulse Oximetry: 96 %        RUL Breath Sounds: Diminished, RML Breath Sounds: Diminished, RLL Breath Sounds: Diminished, STEPH Breath Sounds: Diminished, LLL Breath Sounds: Diminished    Fluids    Intake/Output Summary (Last 24 hours) at 18 1229  Last data filed at 18 0400   Gross per 24 hour   Intake              240 ml   Output              300 ml   Net              -60 ml       Nutrition  Orders Placed This Encounter   Procedures   • Diet Order Diabetic     Standing Status:   Standing     Number of Occurrences:   1     Order Specific Question:   Diet:     Answer:   Diabetic [3]     Physical Exam   Constitutional: He is oriented to person, place, and time. He appears well-developed and well-nourished. He is cooperative.   HENT:   Head: Normocephalic and atraumatic.   Eyes: Conjunctivae are normal. Right eye exhibits no discharge. Left eye exhibits no discharge.   Cardiovascular: Normal  "rate and regular rhythm.    Pulmonary/Chest: Effort normal and breath sounds normal. He has no wheezes. He has no rales.   Abdominal: Soft. There is no tenderness. There is no rebound and no guarding.   Musculoskeletal:   Right foot in clean and intact dressings   Neurological: He is alert and oriented to person, place, and time.   Skin: Skin is warm and dry.   Right foot plantar ulcers and amputation site are dry   Psychiatric: He is withdrawn. He expresses no suicidal ideation.   Nursing note and vitals reviewed.          Recent Labs      08/16/18   0459  08/17/18   0409  08/18/18   0418   SODIUM  138  142  140   POTASSIUM  3.8  3.5*  3.8   CHLORIDE  111  111  110   CO2  21  23  23   GLUCOSE  171*  124*  138*   BUN  13  14  12   CREATININE  0.74  0.76  0.82   CALCIUM  8.7  8.7  8.9                      Assessment/Plan     * Osteomyelitis due to type 2 diabetes mellitus (HCC)   Assessment & Plan    - ID and ortho consulted, recs appreciated  -  limb preservation evaluation completed  - MRI of right foot showing 4th and 5th toe osteomyelitis, septic arthritis, and cellulitis  - 8/12 right 4th toe amputation done  - will get PT/OT once cleared by ortho, don't anticipate significant needs  - Wound Cx + group b strep, strep viridans;  Cont unasyn until 9/10 per ID. He is refusing PICC          Uncontrolled diabetes mellitus (HCC)   Assessment & Plan    Hyperglycemia  - cont lantus 30U qPM, ISS, hypoglycemic protocol, diabetic diet  - A1c is 12  - diabetes education  - restarted home metformin  - improved control        Hypertension- (present on admission)   Assessment & Plan    continue Prinivil and amlodipine          Suicidal ideation   Assessment & Plan    - pt placed on legal hold 8/11 when trying to leave AMA and his plan for worsening infection was to \"kill himself\" and saying he has plenty of guns at home  - psych following            Hyperlipidemia- (present on admission)   Assessment & Plan    continue home " Lipitor.          Quality-Core Measures   Reviewed items::  EKG reviewed, Labs reviewed, Medications reviewed and Radiology images reviewed  Saavedra catheter::  No Saavedra  DVT prophylaxis pharmacological::  Enoxaparin (Lovenox)

## 2018-08-18 NOTE — CARE PLAN
Problem: Safety  Goal: Will remain free from injury  Outcome: PROGRESSING AS EXPECTED  Safety precautions in place. Bed in locked/low position. 2 side rails up. Treaded socks. Call light in reach, calls appropriately. Hourly rounding practiced. Sitter at bedside.     Problem: Infection  Goal: Will remain free from infection  Outcome: PROGRESSING AS EXPECTED  Monitoring toe amputation site, dressing clean, dry, and intact. VSS.

## 2018-08-19 LAB
ANION GAP SERPL CALC-SCNC: 8 MMOL/L (ref 0–11.9)
BUN SERPL-MCNC: 13 MG/DL (ref 8–22)
CALCIUM SERPL-MCNC: 9.1 MG/DL (ref 8.5–10.5)
CHLORIDE SERPL-SCNC: 107 MMOL/L (ref 96–112)
CO2 SERPL-SCNC: 25 MMOL/L (ref 20–33)
CREAT SERPL-MCNC: 0.75 MG/DL (ref 0.5–1.4)
GLUCOSE BLD-MCNC: 102 MG/DL (ref 65–99)
GLUCOSE BLD-MCNC: 121 MG/DL (ref 65–99)
GLUCOSE BLD-MCNC: 151 MG/DL (ref 65–99)
GLUCOSE SERPL-MCNC: 102 MG/DL (ref 65–99)
POTASSIUM SERPL-SCNC: 3.8 MMOL/L (ref 3.6–5.5)
SODIUM SERPL-SCNC: 140 MMOL/L (ref 135–145)

## 2018-08-19 PROCEDURE — A9270 NON-COVERED ITEM OR SERVICE: HCPCS | Performed by: PSYCHIATRY & NEUROLOGY

## 2018-08-19 PROCEDURE — 700102 HCHG RX REV CODE 250 W/ 637 OVERRIDE(OP): Performed by: HOSPITALIST

## 2018-08-19 PROCEDURE — 700111 HCHG RX REV CODE 636 W/ 250 OVERRIDE (IP): Performed by: HOSPITALIST

## 2018-08-19 PROCEDURE — A9270 NON-COVERED ITEM OR SERVICE: HCPCS | Performed by: HOSPITALIST

## 2018-08-19 PROCEDURE — 82962 GLUCOSE BLOOD TEST: CPT | Mod: 91

## 2018-08-19 PROCEDURE — 770001 HCHG ROOM/CARE - MED/SURG/GYN PRIV*

## 2018-08-19 PROCEDURE — 700105 HCHG RX REV CODE 258: Performed by: HOSPITALIST

## 2018-08-19 PROCEDURE — 700102 HCHG RX REV CODE 250 W/ 637 OVERRIDE(OP): Performed by: PSYCHIATRY & NEUROLOGY

## 2018-08-19 PROCEDURE — A9270 NON-COVERED ITEM OR SERVICE: HCPCS | Performed by: INTERNAL MEDICINE

## 2018-08-19 PROCEDURE — 36415 COLL VENOUS BLD VENIPUNCTURE: CPT

## 2018-08-19 PROCEDURE — 700111 HCHG RX REV CODE 636 W/ 250 OVERRIDE (IP): Performed by: INTERNAL MEDICINE

## 2018-08-19 PROCEDURE — 700102 HCHG RX REV CODE 250 W/ 637 OVERRIDE(OP): Performed by: INTERNAL MEDICINE

## 2018-08-19 PROCEDURE — 80048 BASIC METABOLIC PNL TOTAL CA: CPT

## 2018-08-19 RX ADMIN — METFORMIN HYDROCHLORIDE 850 MG: 850 TABLET ORAL at 17:51

## 2018-08-19 RX ADMIN — ATORVASTATIN CALCIUM 20 MG: 20 TABLET, FILM COATED ORAL at 05:31

## 2018-08-19 RX ADMIN — INSULIN HUMAN 2 UNITS: 100 INJECTION, SOLUTION PARENTERAL at 21:34

## 2018-08-19 RX ADMIN — AMPICILLIN AND SULBACTAM 3 G: 2; 1 INJECTION, POWDER, FOR SOLUTION INTRAVENOUS at 09:12

## 2018-08-19 RX ADMIN — AMPICILLIN AND SULBACTAM 3 G: 2; 1 INJECTION, POWDER, FOR SOLUTION INTRAVENOUS at 21:31

## 2018-08-19 RX ADMIN — METFORMIN HYDROCHLORIDE 850 MG: 850 TABLET ORAL at 09:12

## 2018-08-19 RX ADMIN — Medication 2 TABLET: at 05:30

## 2018-08-19 RX ADMIN — ARIPIPRAZOLE 5 MG: 10 TABLET ORAL at 05:31

## 2018-08-19 RX ADMIN — AMPICILLIN AND SULBACTAM 3 G: 2; 1 INJECTION, POWDER, FOR SOLUTION INTRAVENOUS at 04:01

## 2018-08-19 RX ADMIN — AMLODIPINE BESYLATE 5 MG: 10 TABLET ORAL at 05:31

## 2018-08-19 RX ADMIN — INSULIN HUMAN 2 UNITS: 100 INJECTION, SOLUTION PARENTERAL at 17:49

## 2018-08-19 RX ADMIN — LISINOPRIL 40 MG: 20 TABLET ORAL at 05:31

## 2018-08-19 RX ADMIN — INSULIN GLARGINE 30 UNITS: 100 INJECTION, SOLUTION SUBCUTANEOUS at 17:50

## 2018-08-19 RX ADMIN — Medication 2 TABLET: at 17:51

## 2018-08-19 RX ADMIN — AMPICILLIN AND SULBACTAM 3 G: 2; 1 INJECTION, POWDER, FOR SOLUTION INTRAVENOUS at 16:16

## 2018-08-19 RX ADMIN — METFORMIN HYDROCHLORIDE 850 MG: 850 TABLET ORAL at 12:53

## 2018-08-19 RX ADMIN — ENOXAPARIN SODIUM 40 MG: 100 INJECTION SUBCUTANEOUS at 05:31

## 2018-08-19 ASSESSMENT — PAIN SCALES - GENERAL
PAINLEVEL_OUTOF10: 0

## 2018-08-19 ASSESSMENT — ENCOUNTER SYMPTOMS
SHORTNESS OF BREATH: 0
ABDOMINAL PAIN: 0
CONSTIPATION: 0
WEAKNESS: 0
DEPRESSION: 0

## 2018-08-19 NOTE — CARE PLAN
Problem: Mobility  Goal: Risk for activity intolerance will decrease  Outcome: PROGRESSING AS EXPECTED  Patient up walking to and from bathroom-heel weight bearing only

## 2018-08-19 NOTE — PROGRESS NOTES
Patient alert and oriented, withdrawn-flat affect. Sitter at bedside. Patient assessed and all needs met. Patient's right foot assessed-dressing intact-ace wrap reinforced. Patient educated on heel weight bearing to right foot-verbalized understanding. Left foot assessed-ulcer to left foot-PAIGE. Patient had a shower. Blood sugars checked per order. All belongings allowed within reach. Call bell within reach. Monitoring to continue.

## 2018-08-19 NOTE — CARE PLAN
Problem: Safety  Goal: Will remain free from injury  Outcome: PROGRESSING AS EXPECTED  All fall and safety precautions in place, sitter at bedside. No evidence of fall or harm.

## 2018-08-19 NOTE — CARE PLAN
Problem: Communication  Goal: The ability to communicate needs accurately and effectively will improve  Outcome: PROGRESSING AS EXPECTED  Discusses plan of care with patient at beginning of shift    Problem: Safety  Goal: Will remain free from injury  Outcome: PROGRESSING AS EXPECTED  Sitter at bedside at all times

## 2018-08-19 NOTE — PROGRESS NOTES
Assumed care of patient at 1900    Pt is A&O 4. Pt has flat affect and is reluctant to answer questions.Sitter at bedside.  Denies pain.   Denies nausea  Tolerating Diet  Right foot dressing clean, dry and intact  Voiding without difficulty, + flatus  Up with standby assist   SCD's refused  Bed in lowest position and locked.  Reviewed plan of care with patient, bed in lowest position and locked, pt resting comfortably now, call light within reach, all needs met at this time

## 2018-08-19 NOTE — PROGRESS NOTES
[facility] Hospitalist Progress Note    Date of Service: 2018    Chief Complaint  62 y.o. male admitted 2018 with DM, HTN, HLD who presented with right foot osteomyelitis s/p 4th toe amputation . With suicidal ideation.    Interval Problem Update  He remains withdrawn. Says his mood is good and denies suicidal ideation.    Consultants/Specialty  ID  Psych  Ortho - [facility]    Disposition  Legal hold  Wound care        Review of Systems   Respiratory: Negative for shortness of breath.    Cardiovascular: Negative for chest pain.   Gastrointestinal: Negative for abdominal pain and constipation.   Musculoskeletal: Negative for joint pain.   Neurological: Negative for weakness.   Psychiatric/Behavioral: Negative for depression and suicidal ideas.      Physical Exam  Laboratory/Imaging   Hemodynamics  Temp (24hrs), Av.6 °C (97.8 °F), Min:36 °C (96.8 °F), Max:37 °C (98.6 °F)   Temperature: 36.3 °C (97.4 °F)  Pulse  Av.8  Min: 60  Max: 122    Blood Pressure: 127/80      Respiratory      Respiration: 17, Pulse Oximetry: 97 %        RUL Breath Sounds: Clear, RML Breath Sounds: Clear, RLL Breath Sounds: Diminished, STEPH Breath Sounds: Clear, LLL Breath Sounds: Diminished    Fluids    Intake/Output Summary (Last 24 hours) at 18 1318  Last data filed at 18 0400   Gross per 24 hour   Intake             1340 ml   Output              250 ml   Net             1090 ml       Nutrition  Orders Placed This Encounter   Procedures   • Diet Order Diabetic     Standing Status:   Standing     Number of Occurrences:   1     Order Specific Question:   Diet:     Answer:   Diabetic [3]     Physical Exam   Constitutional: He is oriented to person, place, and time. He appears well-developed and well-nourished. He is cooperative.   HENT:   Head: Normocephalic and atraumatic.   Eyes: Conjunctivae are normal. Right eye exhibits no discharge. Left eye exhibits no discharge.   Cardiovascular: Normal rate and regular rhythm.   "  Pulmonary/Chest: Effort normal and breath sounds normal. He has no wheezes. He has no rales.   Abdominal: Soft. There is no tenderness. There is no rebound and no guarding.   Musculoskeletal:   Right foot in clean and intact dressings   Neurological: He is alert and oriented to person, place, and time.   Skin:   Right foot plantar ulcers and amputation site are dry   Psychiatric: He is withdrawn. He expresses no suicidal ideation.   Nursing note and vitals reviewed.          Recent Labs      08/17/18   0409  08/18/18   0418  08/19/18   0405   SODIUM  142  140  140   POTASSIUM  3.5*  3.8  3.8   CHLORIDE  111  110  107   CO2  23  23  25   GLUCOSE  124*  138*  102*   BUN  14  12  13   CREATININE  0.76  0.82  0.75   CALCIUM  8.7  8.9  9.1                      Assessment/Plan     * Osteomyelitis due to type 2 diabetes mellitus (HCC)   Assessment & Plan    - ID and ortho consulted, recs appreciated  -  limb preservation evaluation completed  - MRI of right foot showing 4th and 5th toe osteomyelitis, septic arthritis, and cellulitis  - 8/12 right 4th toe amputation done  - will get PT/OT once cleared by ortho, don't anticipate significant needs  - Wound Cx + group b strep, strep viridans;  Cont unasyn until 9/10 per ID. He is refusing PICC          Uncontrolled diabetes mellitus (HCC)   Assessment & Plan    Hyperglycemia  - cont lantus 30U qPM, ISS, hypoglycemic protocol, diabetic diet  - A1c is 12  - diabetes education  - restarted home metformin  - improved control        Hypertension- (present on admission)   Assessment & Plan    continue Prinivil and amlodipine          Suicidal ideation   Assessment & Plan    - pt placed on legal hold 8/11 when trying to leave AMA and his plan for worsening infection was to \"kill himself\" and saying he has plenty of guns at home  - psych following            Hyperlipidemia- (present on admission)   Assessment & Plan    continue home Lipitor.          Quality-Core Measures   Reviewed " items::  EKG reviewed, Labs reviewed, Medications reviewed and Radiology images reviewed  Saavedra catheter::  No Saavedra  DVT prophylaxis pharmacological::  Enoxaparin (Lovenox)

## 2018-08-20 LAB
ANION GAP SERPL CALC-SCNC: 7 MMOL/L (ref 0–11.9)
BUN SERPL-MCNC: 17 MG/DL (ref 8–22)
CALCIUM SERPL-MCNC: 9.4 MG/DL (ref 8.5–10.5)
CHLORIDE SERPL-SCNC: 107 MMOL/L (ref 96–112)
CO2 SERPL-SCNC: 25 MMOL/L (ref 20–33)
CREAT SERPL-MCNC: 0.81 MG/DL (ref 0.5–1.4)
GLUCOSE BLD-MCNC: 112 MG/DL (ref 65–99)
GLUCOSE BLD-MCNC: 151 MG/DL (ref 65–99)
GLUCOSE BLD-MCNC: 95 MG/DL (ref 65–99)
GLUCOSE SERPL-MCNC: 105 MG/DL (ref 65–99)
POTASSIUM SERPL-SCNC: 3.7 MMOL/L (ref 3.6–5.5)
SODIUM SERPL-SCNC: 139 MMOL/L (ref 135–145)

## 2018-08-20 PROCEDURE — 700102 HCHG RX REV CODE 250 W/ 637 OVERRIDE(OP): Performed by: HOSPITALIST

## 2018-08-20 PROCEDURE — 97535 SELF CARE MNGMENT TRAINING: CPT

## 2018-08-20 PROCEDURE — 80048 BASIC METABOLIC PNL TOTAL CA: CPT

## 2018-08-20 PROCEDURE — 700111 HCHG RX REV CODE 636 W/ 250 OVERRIDE (IP): Performed by: HOSPITALIST

## 2018-08-20 PROCEDURE — 770001 HCHG ROOM/CARE - MED/SURG/GYN PRIV*

## 2018-08-20 PROCEDURE — 99232 SBSQ HOSP IP/OBS MODERATE 35: CPT | Performed by: INTERNAL MEDICINE

## 2018-08-20 PROCEDURE — 700102 HCHG RX REV CODE 250 W/ 637 OVERRIDE(OP): Performed by: PSYCHIATRY & NEUROLOGY

## 2018-08-20 PROCEDURE — 700105 HCHG RX REV CODE 258

## 2018-08-20 PROCEDURE — 97530 THERAPEUTIC ACTIVITIES: CPT

## 2018-08-20 PROCEDURE — 82962 GLUCOSE BLOOD TEST: CPT | Mod: 91

## 2018-08-20 PROCEDURE — A9270 NON-COVERED ITEM OR SERVICE: HCPCS | Performed by: HOSPITALIST

## 2018-08-20 PROCEDURE — 700105 HCHG RX REV CODE 258: Performed by: HOSPITALIST

## 2018-08-20 PROCEDURE — 700102 HCHG RX REV CODE 250 W/ 637 OVERRIDE(OP): Performed by: INTERNAL MEDICINE

## 2018-08-20 PROCEDURE — A9270 NON-COVERED ITEM OR SERVICE: HCPCS | Performed by: PSYCHIATRY & NEUROLOGY

## 2018-08-20 PROCEDURE — 700111 HCHG RX REV CODE 636 W/ 250 OVERRIDE (IP): Performed by: INTERNAL MEDICINE

## 2018-08-20 PROCEDURE — A9270 NON-COVERED ITEM OR SERVICE: HCPCS | Performed by: INTERNAL MEDICINE

## 2018-08-20 PROCEDURE — 36415 COLL VENOUS BLD VENIPUNCTURE: CPT

## 2018-08-20 PROCEDURE — 99233 SBSQ HOSP IP/OBS HIGH 50: CPT | Performed by: PSYCHIATRY & NEUROLOGY

## 2018-08-20 RX ORDER — ARIPIPRAZOLE 10 MG/1
5 TABLET ORAL ONCE
Status: COMPLETED | OUTPATIENT
Start: 2018-08-20 | End: 2018-08-20

## 2018-08-20 RX ORDER — SODIUM CHLORIDE 9 MG/ML
INJECTION, SOLUTION INTRAVENOUS
Status: COMPLETED
Start: 2018-08-20 | End: 2018-08-20

## 2018-08-20 RX ORDER — ARIPIPRAZOLE 10 MG/1
10 TABLET ORAL DAILY
Status: DISCONTINUED | OUTPATIENT
Start: 2018-08-21 | End: 2018-08-24 | Stop reason: HOSPADM

## 2018-08-20 RX ADMIN — SODIUM CHLORIDE 500 ML: 9 INJECTION, SOLUTION INTRAVENOUS at 23:37

## 2018-08-20 RX ADMIN — METFORMIN HYDROCHLORIDE 850 MG: 850 TABLET ORAL at 11:40

## 2018-08-20 RX ADMIN — METFORMIN HYDROCHLORIDE 850 MG: 850 TABLET ORAL at 17:22

## 2018-08-20 RX ADMIN — INSULIN GLARGINE 30 UNITS: 100 INJECTION, SOLUTION SUBCUTANEOUS at 17:20

## 2018-08-20 RX ADMIN — ARIPIPRAZOLE 5 MG: 10 TABLET ORAL at 15:38

## 2018-08-20 RX ADMIN — AMPICILLIN AND SULBACTAM 3 G: 2; 1 INJECTION, POWDER, FOR SOLUTION INTRAVENOUS at 15:34

## 2018-08-20 RX ADMIN — Medication 2 TABLET: at 05:27

## 2018-08-20 RX ADMIN — AMLODIPINE BESYLATE 5 MG: 10 TABLET ORAL at 05:26

## 2018-08-20 RX ADMIN — AMPICILLIN AND SULBACTAM 3 G: 2; 1 INJECTION, POWDER, FOR SOLUTION INTRAVENOUS at 08:44

## 2018-08-20 RX ADMIN — METFORMIN HYDROCHLORIDE 850 MG: 850 TABLET ORAL at 05:27

## 2018-08-20 RX ADMIN — ARIPIPRAZOLE 5 MG: 10 TABLET ORAL at 05:27

## 2018-08-20 RX ADMIN — ENOXAPARIN SODIUM 40 MG: 100 INJECTION SUBCUTANEOUS at 06:00

## 2018-08-20 RX ADMIN — ATORVASTATIN CALCIUM 20 MG: 20 TABLET, FILM COATED ORAL at 05:26

## 2018-08-20 RX ADMIN — AMPICILLIN AND SULBACTAM 3 G: 2; 1 INJECTION, POWDER, FOR SOLUTION INTRAVENOUS at 03:51

## 2018-08-20 RX ADMIN — LISINOPRIL 40 MG: 20 TABLET ORAL at 05:26

## 2018-08-20 RX ADMIN — AMPICILLIN AND SULBACTAM 3 G: 2; 1 INJECTION, POWDER, FOR SOLUTION INTRAVENOUS at 23:36

## 2018-08-20 ASSESSMENT — COGNITIVE AND FUNCTIONAL STATUS - GENERAL
SUGGESTED CMS G CODE MODIFIER DAILY ACTIVITY: CJ
DAILY ACTIVITIY SCORE: 22
DRESSING REGULAR LOWER BODY CLOTHING: A LITTLE
HELP NEEDED FOR BATHING: A LITTLE

## 2018-08-20 ASSESSMENT — ENCOUNTER SYMPTOMS
DEPRESSION: 0
ABDOMINAL PAIN: 0
CONSTIPATION: 0
WEAKNESS: 0
SHORTNESS OF BREATH: 0

## 2018-08-20 ASSESSMENT — PAIN SCALES - GENERAL
PAINLEVEL_OUTOF10: 0

## 2018-08-20 NOTE — PROGRESS NOTES
Assumed care of patient at 1900    Pt is A&O 4. Pt has flat affect. Pt on legal hold. Order on chart. Sitter at bedside.  Denies pain.   Denies nausea  Tolerating Diet  Right foot dressing clean, dry and intact  Voiding without difficulty, + flatus  Up with standby assist and boot, partial weight bearing  SCD's refused  Reviewed plan of care with patient, bed in lowest position and locked, pt resting comfortably now, call light within reach, all needs met at this time

## 2018-08-20 NOTE — PROGRESS NOTES
Assume care.  Pt awake alert and appropriate.  Sitter Tio at bedside.  No needs or concerns at this time.  SCDs placed with explanation.  Pt denies pain.  NAD will  Monitor.

## 2018-08-20 NOTE — CARE PLAN
Problem: Communication  Goal: The ability to communicate needs accurately and effectively will improve  Outcome: PROGRESSING AS EXPECTED  Plan of care discussed with patient at beginning of shift.    Problem: Safety  Goal: Will remain free from injury  Outcome: PROGRESSING AS EXPECTED  1:1 sitter at bedside    Problem: Pain Management  Goal: Pain level will decrease to patient's comfort goal  Outcome: PROGRESSING AS EXPECTED  Pt denies pain at this time

## 2018-08-20 NOTE — CARE PLAN
Problem: Mobility  Goal: Risk for activity intolerance will decrease  Outcome: PROGRESSING AS EXPECTED    Intervention: Encourage patient to increase activity level in collaboration with Interdisciplinary Team  Ambulated with good strength today with OT.

## 2018-08-20 NOTE — CARE PLAN
Problem: Safety  Goal: Will remain free from falls    Intervention: Implement fall precautions  Call bell in reach, pt oriented. Bed locked and low. Sitter at bedside.

## 2018-08-20 NOTE — THERAPY
"Occupational Therapy Treatment completed with focus on ADLs, ADL transfers and patient education.  Functional Status: Mod I bed mobility, SBA transfers without AD, min A LB dressing, supv standing grooming   Plan of Care: Will benefit from Occupational Therapy 2 times per week  Discharge Recommendations:  Equipment Will Continue to Assess for Equipment Needs. Post-acute therapy: TBD    See \"Rehab Therapy-Acute\" Patient Summary Report for complete documentation.     Pt seen for OT tx to include: supine > EOB, LB dressing, amb to bathroom, standing grooming, amb on unit for endurance. Pt demos improved balance, improved LB dressing. Requires cues for heel WB RLE. Still requires incidental assist with cam boot. Pt states he will likely not wear boot at home. Call to Esme Sidhu to suggest off-loading shoe as possible alternative. Nahum agreeable. Requested call from RN to request order. Spoke with RN who is agreeable. Acute OT to continue following.   "

## 2018-08-20 NOTE — PROGRESS NOTES
[facility] Hospitalist Progress Note    Date of Service: 2018    Chief Complaint  62 y.o. male admitted 2018 with DM, HTN, HLD who presented with right foot osteomyelitis s/p 4th toe amputation . With suicidal ideation.    Interval Problem Update  Withdrawn, asking when he can go home.  I rediscussed IV vs PO antibiotics, he says he would not want longterm antibiotics    Consultants/Specialty  ID  Psych  Ortho - [facility]    Disposition  Legal hold - inpatient psych transfer pending  Wound care changes q48h        Review of Systems   Respiratory: Negative for shortness of breath.    Cardiovascular: Negative for chest pain.   Gastrointestinal: Negative for abdominal pain and constipation.   Musculoskeletal: Negative for joint pain.   Neurological: Negative for weakness.   Psychiatric/Behavioral: Negative for depression.      Physical Exam  Laboratory/Imaging   Hemodynamics  Temp (24hrs), Av.8 °C (98.2 °F), Min:36.3 °C (97.4 °F), Max:37.2 °C (98.9 °F)   Temperature: 36.3 °C (97.4 °F)  Pulse  Av.2  Min: 60  Max: 122    Blood Pressure: 116/78      Respiratory      Respiration: 17, Pulse Oximetry: 93 %        RUL Breath Sounds: Clear, RML Breath Sounds: Diminished, RLL Breath Sounds: Diminished, STEPH Breath Sounds: Clear, LLL Breath Sounds: Diminished    Fluids    Intake/Output Summary (Last 24 hours) at 18 1218  Last data filed at 18 0743   Gross per 24 hour   Intake              360 ml   Output              300 ml   Net               60 ml       Nutrition  Orders Placed This Encounter   Procedures   • Diet Order Diabetic     Standing Status:   Standing     Number of Occurrences:   1     Order Specific Question:   Diet:     Answer:   Diabetic [3]     Physical Exam   Constitutional: He is oriented to person, place, and time. He appears well-developed and well-nourished. He is cooperative.   HENT:   Head: Normocephalic and atraumatic.   Eyes: Conjunctivae are normal. Right eye exhibits no  "discharge. Left eye exhibits no discharge.   Cardiovascular: Normal rate and regular rhythm.    Pulmonary/Chest: Effort normal and breath sounds normal. He has no wheezes.   Abdominal: Soft. There is no tenderness.   Musculoskeletal:   Right foot in clean and intact dressings   Neurological: He is alert and oriented to person, place, and time.   Skin:   Right foot plantar ulcers and amputation site are dry   Psychiatric: He is withdrawn.   Nursing note and vitals reviewed.          Recent Labs      08/18/18   0418  08/19/18   0405  08/20/18   0424   SODIUM  140  140  139   POTASSIUM  3.8  3.8  3.7   CHLORIDE  110  107  107   CO2  23  25  25   GLUCOSE  138*  102*  105*   BUN  12  13  17   CREATININE  0.82  0.75  0.81   CALCIUM  8.9  9.1  9.4                      Assessment/Plan     * Osteomyelitis due to type 2 diabetes mellitus (HCC)   Assessment & Plan    - ID and ortho consulted, recs appreciated  -  limb preservation evaluation completed  - MRI of right foot showing 4th and 5th toe osteomyelitis, septic arthritis, and cellulitis  - 8/12 right 4th toe amputation done  - Wound Cx + group b strep, strep viridans;  Cont unasyn until 9/10 per ID. He does not want long-term antibiotics. ID says can go on Augmentin if so          Uncontrolled diabetes mellitus (HCC)   Assessment & Plan    Hyperglycemia  - cont lantus 30U qPM, ISS, hypoglycemic protocol, diabetic diet  - A1c is 12  - diabetes education  - restarted home metformin  - improved control        Hypertension- (present on admission)   Assessment & Plan    continue Prinivil and amlodipine          Suicidal ideation   Assessment & Plan    - pt placed on legal hold 8/11 when trying to leave AMA and his plan for worsening infection was to \"kill himself\" and saying he has plenty of guns at home  - psych following            Hyperlipidemia- (present on admission)   Assessment & Plan    continue home Lipitor.          Quality-Core Measures   Reviewed items::  EKG " reviewed, Labs reviewed, Medications reviewed and Radiology images reviewed  Saavedra catheter::  No Saavedra  DVT prophylaxis pharmacological::  Enoxaparin (Lovenox)

## 2018-08-21 LAB
ANION GAP SERPL CALC-SCNC: 10 MMOL/L (ref 0–11.9)
BUN SERPL-MCNC: 16 MG/DL (ref 8–22)
CALCIUM SERPL-MCNC: 9 MG/DL (ref 8.5–10.5)
CHLORIDE SERPL-SCNC: 107 MMOL/L (ref 96–112)
CO2 SERPL-SCNC: 24 MMOL/L (ref 20–33)
CREAT SERPL-MCNC: 0.74 MG/DL (ref 0.5–1.4)
GLUCOSE BLD-MCNC: 111 MG/DL (ref 65–99)
GLUCOSE BLD-MCNC: 114 MG/DL (ref 65–99)
GLUCOSE BLD-MCNC: 134 MG/DL (ref 65–99)
GLUCOSE BLD-MCNC: 153 MG/DL (ref 65–99)
GLUCOSE BLD-MCNC: 65 MG/DL (ref 65–99)
GLUCOSE BLD-MCNC: 87 MG/DL (ref 65–99)
GLUCOSE BLD-MCNC: 90 MG/DL (ref 65–99)
GLUCOSE SERPL-MCNC: 61 MG/DL (ref 65–99)
POTASSIUM SERPL-SCNC: 3.2 MMOL/L (ref 3.6–5.5)
SODIUM SERPL-SCNC: 141 MMOL/L (ref 135–145)

## 2018-08-21 PROCEDURE — 700102 HCHG RX REV CODE 250 W/ 637 OVERRIDE(OP): Performed by: HOSPITALIST

## 2018-08-21 PROCEDURE — 99232 SBSQ HOSP IP/OBS MODERATE 35: CPT | Performed by: INTERNAL MEDICINE

## 2018-08-21 PROCEDURE — A9270 NON-COVERED ITEM OR SERVICE: HCPCS | Performed by: INTERNAL MEDICINE

## 2018-08-21 PROCEDURE — 700102 HCHG RX REV CODE 250 W/ 637 OVERRIDE(OP): Performed by: PSYCHIATRY & NEUROLOGY

## 2018-08-21 PROCEDURE — 700102 HCHG RX REV CODE 250 W/ 637 OVERRIDE(OP): Performed by: INTERNAL MEDICINE

## 2018-08-21 PROCEDURE — A9270 NON-COVERED ITEM OR SERVICE: HCPCS | Performed by: PSYCHIATRY & NEUROLOGY

## 2018-08-21 PROCEDURE — 700105 HCHG RX REV CODE 258: Performed by: HOSPITALIST

## 2018-08-21 PROCEDURE — A9270 NON-COVERED ITEM OR SERVICE: HCPCS | Performed by: HOSPITALIST

## 2018-08-21 PROCEDURE — 80048 BASIC METABOLIC PNL TOTAL CA: CPT

## 2018-08-21 PROCEDURE — 36415 COLL VENOUS BLD VENIPUNCTURE: CPT

## 2018-08-21 PROCEDURE — 700111 HCHG RX REV CODE 636 W/ 250 OVERRIDE (IP): Performed by: INTERNAL MEDICINE

## 2018-08-21 PROCEDURE — 700111 HCHG RX REV CODE 636 W/ 250 OVERRIDE (IP): Performed by: HOSPITALIST

## 2018-08-21 PROCEDURE — 770001 HCHG ROOM/CARE - MED/SURG/GYN PRIV*

## 2018-08-21 PROCEDURE — 82962 GLUCOSE BLOOD TEST: CPT | Mod: 91

## 2018-08-21 RX ORDER — POTASSIUM CHLORIDE 20 MEQ/1
40 TABLET, EXTENDED RELEASE ORAL ONCE
Status: COMPLETED | OUTPATIENT
Start: 2018-08-21 | End: 2018-08-21

## 2018-08-21 RX ADMIN — AMPICILLIN AND SULBACTAM 3 G: 2; 1 INJECTION, POWDER, FOR SOLUTION INTRAVENOUS at 17:46

## 2018-08-21 RX ADMIN — AMLODIPINE BESYLATE 5 MG: 10 TABLET ORAL at 06:26

## 2018-08-21 RX ADMIN — ARIPIPRAZOLE 10 MG: 10 TABLET ORAL at 06:26

## 2018-08-21 RX ADMIN — METFORMIN HYDROCHLORIDE 850 MG: 850 TABLET ORAL at 06:26

## 2018-08-21 RX ADMIN — ENOXAPARIN SODIUM 40 MG: 100 INJECTION SUBCUTANEOUS at 06:28

## 2018-08-21 RX ADMIN — INSULIN GLARGINE 30 UNITS: 100 INJECTION, SOLUTION SUBCUTANEOUS at 17:43

## 2018-08-21 RX ADMIN — AMPICILLIN AND SULBACTAM 3 G: 2; 1 INJECTION, POWDER, FOR SOLUTION INTRAVENOUS at 12:01

## 2018-08-21 RX ADMIN — INSULIN HUMAN 2 UNITS: 100 INJECTION, SOLUTION PARENTERAL at 17:44

## 2018-08-21 RX ADMIN — METFORMIN HYDROCHLORIDE 850 MG: 850 TABLET ORAL at 17:46

## 2018-08-21 RX ADMIN — Medication 2 TABLET: at 06:26

## 2018-08-21 RX ADMIN — METFORMIN HYDROCHLORIDE 850 MG: 850 TABLET ORAL at 12:01

## 2018-08-21 RX ADMIN — ATORVASTATIN CALCIUM 20 MG: 20 TABLET, FILM COATED ORAL at 06:26

## 2018-08-21 RX ADMIN — LISINOPRIL 40 MG: 20 TABLET ORAL at 07:26

## 2018-08-21 RX ADMIN — AMPICILLIN AND SULBACTAM 3 G: 2; 1 INJECTION, POWDER, FOR SOLUTION INTRAVENOUS at 06:28

## 2018-08-21 RX ADMIN — POTASSIUM CHLORIDE 40 MEQ: 1500 TABLET, EXTENDED RELEASE ORAL at 09:43

## 2018-08-21 ASSESSMENT — PAIN SCALES - GENERAL
PAINLEVEL_OUTOF10: 0

## 2018-08-21 ASSESSMENT — ENCOUNTER SYMPTOMS
WEAKNESS: 0
ABDOMINAL PAIN: 0
SHORTNESS OF BREATH: 0
DEPRESSION: 0
CONSTIPATION: 0

## 2018-08-21 NOTE — PROGRESS NOTES
Infectious Disease Progress Note    Author: Karmen Saucedo M.D. Date & Time of service: 2018  1:01 PM    Chief Complaint:  Follow-up for right diabetic foot infection    Interval History:   AF WBC 9.7 patient denies any complaints however is a poor historian given paranoid schizophrenia, patient threatened to leave AMA   AF no CBC no new issues to report the patient is a poor historian, sitter at bedside as patient stated he wanted to kill himself yesterday, plan for IND with possible toe amputation today  2018-T-max 99.  No new issues overnight.  Underwent surgery this morning  2018-T-max 98.3.  No new issues overnight.  8/15/2018-T-max 98.  Wants to go home and apparently has been refusing PICC .  2018-T-max 99.5.  I had a long conversation with patient but it is unclear how much registers.  WBC 8 platelets 374 creatinine 0.74  2018 T-max 98.5.  Patient says he does not want to go to snf.  Still on one-to-one observation   AF answers no to all questions   AF creat 0.74 no clinical change  Labs Reviewed, Medications Reviewed, Radiology Reviewed and Wound Reviewed.    Review of Systems:  Review of Systems   Unable to perform ROS: Psychiatric disorder       Hemodynamics:  Temp (24hrs), Av.4 °C (97.6 °F), Min:36.2 °C (97.1 °F), Max:36.6 °C (97.9 °F)  Temperature: 36.6 °C (97.9 °F)  Pulse  Av.7  Min: 59  Max: 122   Blood Pressure: 113/77       Physical Exam:  Physical Exam   Constitutional: He appears well-developed. No distress.   Disheveled   HENT:   Head: Normocephalic and atraumatic.   Eyes: No scleral icterus.   Poor eye contact   Neck: Neck supple.   Cardiovascular: Normal rate.    Irregular   Pulmonary/Chest: Effort normal. No respiratory distress.   Abdominal: Soft.   Musculoskeletal:   Right foot dressed-declined removal   Neurological: He is alert.   Skin: No rash noted. He is not diaphoretic.   Psychiatric:   Flat affect   Nursing note and vitals  reviewed.      Meds:    Current Facility-Administered Medications:   •  ARIPiprazole  •  metFORMIN  •  enoxaparin (LOVENOX) injection  •  ampicillin-sulbactam (UNASYN) IV  •  amLODIPine  •  lisinopril  •  atorvastatin  •  senna-docusate **AND** polyethylene glycol/lytes **AND** magnesium hydroxide **AND** bisacodyl  •  NS  •  acetaminophen  •  insulin regular **AND** Accu-Chek ACHS **AND** NOTIFY MD and PharmD **AND** glucose 4 g **AND** dextrose 50%  •  ondansetron  •  ondansetron  •  promethazine  •  promethazine  •  prochlorperazine  •  insulin glargine  •  labetalol    Labs:  No results for input(s): WBC, RBC, HEMOGLOBIN, HEMATOCRIT, MCV, MCH, RDW, PLATELETCT, MPV, NEUTSPOLYS, LYMPHOCYTES, MONOCYTES, EOSINOPHILS, BASOPHILS, RBCMORPHOLO in the last 72 hours.  Recent Labs      08/19/18   0405  08/20/18   0424  08/21/18   0411   SODIUM  140  139  141   POTASSIUM  3.8  3.7  3.2*   CHLORIDE  107  107  107   CO2  25  25  24   GLUCOSE  102*  105*  61*   BUN  13  17  16     Recent Labs      08/19/18   0405  08/20/18   0424  08/21/18   0411   CREATININE  0.75  0.81  0.74       Imaging:  Dx-foot-complete 3+ Right    Result Date: 8/9/2018 8/9/2018 6:43 PM HISTORY/REASON FOR EXAM:  Right foot pain and swelling. Multiple open wounds. TECHNIQUE/EXAM DESCRIPTION AND NUMBER OF VIEWS: 3 views of the RIGHT foot. COMPARISON:  None. FINDINGS: No acute fracture or dislocation is present. There is a moderate hallux valgus deformity present. There is soft tissue swelling surrounding the 4th and 5th toes. There is irregularity of the 5th MTP joint and the 5th IP joint without evidence of acute erosion. This may represent prior injury or inflammation. No lytic or blastic bony defect is identified to suggest acute osteomyelitis.     1.  No acute right foot fracture or dislocation. 2.  Soft tissue swelling surrounding the 4th and 5th toes. 3.  No plain film evidence of acute osteomyelitis. 4.  Degenerative or post inflammatory  irregularity of the right 5th MTP joint and IP joint. 5.  Moderate hallux valgus deformity.      Micro:  Results     Procedure Component Value Units Date/Time    CULTURE TISSUE W/ GRM STAIN [[id number]]  (Abnormal) Collected:  08/12/18 1751    Order Status:  Completed Specimen:  Tissue Updated:  08/15/18 1050     Significant Indicator POS (POS)     Source TISS     Site Right 4th Toe Tissue     Tissue Culture -- (A)     Gram Stain Result Many Gram positive cocci. (A)     Tissue Culture Streptococcus agalactiae (Group B)  Moderate growth   (A)      Viridans Streptococcus  Moderate growth   (A)    ANAEROBIC CULTURE [[id number]] Collected:  08/12/18 1751    Order Status:  Completed Specimen:  Tissue Updated:  08/15/18 1050     Significant Indicator NEG     Source TISS     Site Right 4th Toe Tissue     Anaerobic Culture, Culture Res No Anaerobes isolated.    CULTURE WOUND W/ GRAM STAIN [[id number]]  (Abnormal) Collected:  08/12/18 1749    Order Status:  Completed Specimen:  Wound Updated:  08/15/18 1048     Significant Indicator POS (POS)     Source WND     Site Right 4th Toe     Culture Result Wound -- (A)     Gram Stain Result Rare WBCs.  Few Gram positive cocci.       Culture Result Wound Streptococcus agalactiae (Group B)  Moderate growth   (A)      Viridans Streptococcus  Moderate growth   (A)    ANAEROBIC CULTURE [[id number]] Collected:  08/12/18 1749    Order Status:  Completed Specimen:  Wound Updated:  08/15/18 1048     Significant Indicator NEG     Source WND     Site Right 4th Toe     Anaerobic Culture, Culture Res No Anaerobes isolated.          Assessment:  Active Hospital Problems    Diagnosis   • *Osteomyelitis due to type 2 diabetes mellitus (HCC) [E11.69, M86.9]   • Uncontrolled diabetes mellitus (HCC) [E11.65]   • Hypertension [I10]   • Hyponatremia [E87.1]   • Hyperlipidemia [E78.5]       Plan:  Right diabetic foot infection  Afebrile  Leukocytosis resolved  s/p right fourth toe amputation and right foot  I&D on 8/12/2018  Due to foot abscess anticipate 4 weeks IV abx   Wound culture+ group B streptococcus strep viridans  Continue Unasyn through 9/10  Wound care  LPS following  Will need placement    DM 2, poorly controlled  Hemoglobin A1c 12   Maintain blood sugars less than 150 to control infection and promote wound healing    Paranoid schizophrenia  Sitter at bedside  Impediment to his care and treatment    Discussed with internal medicine/

## 2018-08-21 NOTE — PROGRESS NOTES
Infectious Disease Progress Note    Author: Karmen Saucedo M.D. Date & Time of service: 2018  7:06 PM    Chief Complaint:  Follow-up for right diabetic foot infection    Interval History:   AF WBC 9.7 patient denies any complaints however is a poor historian given paranoid schizophrenia, patient threatened to leave AMA   AF no CBC no new issues to report the patient is a poor historian, sitter at bedside as patient stated he wanted to kill himself yesterday, plan for IND with possible toe amputation today  2018-T-max 99.  No new issues overnight.  Underwent surgery this morning  2018-T-max 98.3.  No new issues overnight.  8/15/2018-T-max 98.  Wants to go home and apparently has been refusing PICC .  2018-T-max 99.5.  I had a long conversation with patient but it is unclear how much registers.  WBC 8 platelets 374 creatinine 0.74  2018 T-max 98.5.  Patient says he does not want to go to snf.  Still on one-to-one observation   AF answers no to all questions  Labs Reviewed, Medications Reviewed, Radiology Reviewed and Wound Reviewed.    Review of Systems:  Review of Systems   Unable to perform ROS: Psychiatric disorder       Hemodynamics:  Temp (24hrs), Av.8 °C (98.2 °F), Min:36.3 °C (97.4 °F), Max:37.2 °C (98.9 °F)  Temperature: 36.6 °C (97.8 °F)  Pulse  Av.1  Min: 60  Max: 122   Blood Pressure: 142/91       Physical Exam:  Physical Exam   Constitutional: He appears well-developed. No distress.   Disheveled   HENT:   Head: Normocephalic and atraumatic.   Eyes: Pupils are equal, round, and reactive to light. EOM are normal.   Poor eye contact   Neck: Neck supple.   Cardiovascular: Normal rate.    Irregular   Pulmonary/Chest: Effort normal. No respiratory distress.   Abdominal: Soft.   Musculoskeletal:   Right foot dressed-declined removal   Neurological: He is alert.   Skin: He is not diaphoretic.   Psychiatric:   Flat affect   Nursing note and vitals  reviewed.      Meds:    Current Facility-Administered Medications:   •  [START ON 8/21/2018] ARIPiprazole  •  metFORMIN  •  enoxaparin (LOVENOX) injection  •  ampicillin-sulbactam (UNASYN) IV  •  amLODIPine  •  lisinopril  •  atorvastatin  •  senna-docusate **AND** polyethylene glycol/lytes **AND** magnesium hydroxide **AND** bisacodyl  •  NS  •  acetaminophen  •  insulin regular **AND** Accu-Chek ACHS **AND** NOTIFY MD and PharmD **AND** glucose 4 g **AND** dextrose 50%  •  ondansetron  •  ondansetron  •  promethazine  •  promethazine  •  prochlorperazine  •  insulin glargine  •  labetalol    Labs:  No results for input(s): WBC, RBC, HEMOGLOBIN, HEMATOCRIT, MCV, MCH, RDW, PLATELETCT, MPV, NEUTSPOLYS, LYMPHOCYTES, MONOCYTES, EOSINOPHILS, BASOPHILS, RBCMORPHOLO in the last 72 hours.  Recent Labs      08/18/18 0418  08/19/18   0405  08/20/18   0424   SODIUM  140  140  139   POTASSIUM  3.8  3.8  3.7   CHLORIDE  110  107  107   CO2  23  25  25   GLUCOSE  138*  102*  105*   BUN  12  13  17     Recent Labs      08/18/18 0418  08/19/18   0405  08/20/18   0424   CREATININE  0.82  0.75  0.81       Imaging:  Dx-foot-complete 3+ Right    Result Date: 8/9/2018 8/9/2018 6:43 PM HISTORY/REASON FOR EXAM:  Right foot pain and swelling. Multiple open wounds. TECHNIQUE/EXAM DESCRIPTION AND NUMBER OF VIEWS: 3 views of the RIGHT foot. COMPARISON:  None. FINDINGS: No acute fracture or dislocation is present. There is a moderate hallux valgus deformity present. There is soft tissue swelling surrounding the 4th and 5th toes. There is irregularity of the 5th MTP joint and the 5th IP joint without evidence of acute erosion. This may represent prior injury or inflammation. No lytic or blastic bony defect is identified to suggest acute osteomyelitis.     1.  No acute right foot fracture or dislocation. 2.  Soft tissue swelling surrounding the 4th and 5th toes. 3.  No plain film evidence of acute osteomyelitis. 4.  Degenerative or post  inflammatory irregularity of the right 5th MTP joint and IP joint. 5.  Moderate hallux valgus deformity.      Micro:  Results     Procedure Component Value Units Date/Time    CULTURE TISSUE W/ GRM STAIN [[id number]]  (Abnormal) Collected:  08/12/18 1751    Order Status:  Completed Specimen:  Tissue Updated:  08/15/18 1050     Significant Indicator POS (POS)     Source TISS     Site Right 4th Toe Tissue     Tissue Culture -- (A)     Gram Stain Result Many Gram positive cocci. (A)     Tissue Culture Streptococcus agalactiae (Group B)  Moderate growth   (A)      Viridans Streptococcus  Moderate growth   (A)    ANAEROBIC CULTURE [[id number]] Collected:  08/12/18 1751    Order Status:  Completed Specimen:  Tissue Updated:  08/15/18 1050     Significant Indicator NEG     Source TISS     Site Right 4th Toe Tissue     Anaerobic Culture, Culture Res No Anaerobes isolated.    CULTURE WOUND W/ GRAM STAIN [[id number]]  (Abnormal) Collected:  08/12/18 1749    Order Status:  Completed Specimen:  Wound Updated:  08/15/18 1048     Significant Indicator POS (POS)     Source WND     Site Right 4th Toe     Culture Result Wound -- (A)     Gram Stain Result Rare WBCs.  Few Gram positive cocci.       Culture Result Wound Streptococcus agalactiae (Group B)  Moderate growth   (A)      Viridans Streptococcus  Moderate growth   (A)    ANAEROBIC CULTURE [[id number]] Collected:  08/12/18 1749    Order Status:  Completed Specimen:  Wound Updated:  08/15/18 1048     Significant Indicator NEG     Source WND     Site Right 4th Toe     Anaerobic Culture, Culture Res No Anaerobes isolated.          Assessment:  Active Hospital Problems    Diagnosis   • *Osteomyelitis due to type 2 diabetes mellitus (HCC) [E11.69, M86.9]   • Uncontrolled diabetes mellitus (HCC) [E11.65]   • Hypertension [I10]   • Hyponatremia [E87.1]   • Hyperlipidemia [E78.5]       Plan:  Right diabetic foot infection-additional workup  Afebrile  Leukocytosis resolved  s/p right fourth  toe amputation and right foot I&D on 8/12/2018  Due to foot abscess anticipate 4 weeks IV abx   Wound culture+ group B streptococcus strep viridans  Continue Unasyn through 9/10  Wound care  LPS following  Will need placement    DM 2, poorly controlled  Hemoglobin A1c 12   Maintain blood sugars less than 150 to control infection and promote wound healing    Paranoid schizophrenia  Sitter at bedside  Impediment to his care and treatment    Discussed with internal medicine/

## 2018-08-21 NOTE — PROGRESS NOTES
Assumed care of patient at 0700. Patient is alert and oriented, respirations are unlabored and regular, patient lying in bed at this time, sitter at bedside. Patient denies pain, lungs clear, +bowel sounds, +gas, voiding without difficulty. Right foot in dressing, patient denies numbness or tingling, able to move toes without difficulty. Patient has flat affect. Bed in lowest position, call light within reach, patient states no needs at this time.

## 2018-08-21 NOTE — PROGRESS NOTES
[facility] Hospitalist Progress Note    Date of Service: 2018    Chief Complaint  62 y.o. male admitted 2018 with DM, HTN, HLD who presented with right foot osteomyelitis s/p 4th toe amputation . With suicidal ideation.    Interval Problem Update  Had bout of hypoglycemia early this morning but asymptomatic.  Treated per hypoglycemic protocol and BG stabilized.  Pt with flat affect but denies any pain in right foot.    Consultants/Specialty  ID  Psych  Ortho - [facility]    Disposition  Legal hold - inpatient psych transfer pending  Wound care changes q48h      Review of Systems   Respiratory: Negative for shortness of breath.    Cardiovascular: Negative for chest pain.   Gastrointestinal: Negative for abdominal pain and constipation.   Musculoskeletal: Negative for joint pain.   Neurological: Negative for weakness.   Psychiatric/Behavioral: Negative for depression.      Physical Exam  Laboratory/Imaging   Hemodynamics  Temp (24hrs), Av.4 °C (97.6 °F), Min:36.2 °C (97.1 °F), Max:36.6 °C (97.9 °F)   Temperature: 36.6 °C (97.9 °F)  Pulse  Av.7  Min: 59  Max: 122    Blood Pressure: 113/77      Respiratory      Respiration: 18, Pulse Oximetry: 92 %        RUL Breath Sounds: Clear, RML Breath Sounds: Diminished, RLL Breath Sounds: Diminished, STEPH Breath Sounds: Clear, LLL Breath Sounds: Diminished    Fluids    Intake/Output Summary (Last 24 hours) at 18 1510  Last data filed at 18 1200   Gross per 24 hour   Intake             1210 ml   Output              300 ml   Net              910 ml       Nutrition  Orders Placed This Encounter   Procedures   • Diet Order Diabetic     Standing Status:   Standing     Number of Occurrences:   1     Order Specific Question:   Diet:     Answer:   Diabetic [3]     Physical Exam   Constitutional: He is oriented to person, place, and time. He appears well-developed and well-nourished. He is cooperative.   HENT:   Head: Normocephalic and atraumatic.   Eyes:  "Conjunctivae are normal. Right eye exhibits no discharge. Left eye exhibits no discharge.   Cardiovascular: Normal rate and regular rhythm.    Pulmonary/Chest: Effort normal and breath sounds normal. He has no wheezes.   Abdominal: Soft. There is no tenderness.   Musculoskeletal:   Right foot in clean and intact dressings   Neurological: He is alert and oriented to person, place, and time.   Skin:   Right foot plantar ulcers and amputation site are dry   Psychiatric: He is withdrawn.   Nursing note and vitals reviewed.          Recent Labs      08/19/18   0405  08/20/18   0424  08/21/18   0411   SODIUM  140  139  141   POTASSIUM  3.8  3.7  3.2*   CHLORIDE  107  107  107   CO2  25  25  24   GLUCOSE  102*  105*  61*   BUN  13  17  16   CREATININE  0.75  0.81  0.74   CALCIUM  9.1  9.4  9.0                      Assessment/Plan     * Osteomyelitis due to type 2 diabetes mellitus (HCC)- (present on admission)   Assessment & Plan    - ID and ortho consulted, recs appreciated  -  limb preservation evaluation completed  - MRI of right foot showing 4th and 5th toe osteomyelitis, septic arthritis, and cellulitis  - 8/12 right 4th toe amputation done  - Wound Cx + group b strep, strep viridans;  Cont unasyn until 9/10 per ID. He does not want long-term antibiotics. ID says can go on Augmentin if so        Uncontrolled diabetes mellitus (HCC)- (present on admission)   Assessment & Plan    Hyperglycemia  - cont lantus 30U qPM, ISS, hypoglycemic protocol, diabetic diet  - A1c is 12  - diabetes education  - cont home metformin  - improved control  - hypoglycemic protocol on board        Hypertension- (present on admission)   Assessment & Plan    continue Prinivil and amlodipine        Suicidal ideation- (present on admission)   Assessment & Plan    - pt placed on legal hold 8/11 when trying to leave AMA and his plan for worsening infection was to \"kill himself\" and saying he has plenty of guns at home  - psych following; awaiting " updated recommendations  - cont Abilify        Hyperlipidemia- (present on admission)   Assessment & Plan    continue home Lipitor.          Quality-Core Measures   Reviewed items::  EKG reviewed, Labs reviewed, Medications reviewed and Radiology images reviewed  Saavedra catheter::  No Saavedra  DVT prophylaxis pharmacological::  Enoxaparin (Lovenox)

## 2018-08-21 NOTE — PSYCHIATRY
PSYCHIATRIC FOLLOW UP:    Reason for Admission: osteomyelitis   Legal hold status:   On hold   Psychiatric Supervising Attending:     Yasmine      HPI:     Pt is a 63 y/o man with DMII and osteomyelitis of toe, threatened to kill himself related to toe amputation. Is no longer feeling suicidal. Still appears psychotic and depressed. He has trouble answering questions today, with long pauses in between answers, and is unable to redirect and change this when asked to. He is able to make eye contact when asked to.       Psychiatric Examination: observed phenomenon:  Vitals:   Vitals:    08/20/18 1657 08/20/18 2000 08/21/18 0400 08/21/18 0733   BP: 142/91 128/81 148/72 113/77   Pulse: 78 85 88 (!) 59   Resp: 16 17 17 18   Temp: 36.6 °C (97.8 °F) 36.4 °C (97.5 °F) 36.2 °C (97.1 °F) 36.6 °C (97.9 °F)   SpO2: 93% 94% 94% 92%   Weight:       Height:            Musculoskeletal psychomotor retardation   Appearance:Grooming wnl , wrap on foot is coming off, very poor eye contact   Thoughts: paucity of content   Speech:poverty of speech. Tone blunted   Mood:    Appears depressed, states he is fine.   Affect:    blunted  SI/HI:   Denies   Attention/Alertness:   Fair   Memory:    Appears impaired   Orientation:    Grossly intact.   Fund of Knowledge:    decreased  Cognition:appears impaired   Insight/Judgement into symptoms  Neurological Testing:    Medical systems reviewed:   Denies pain  Denies fatigue  Denies sob  Denies cp  Denies n/v  All other systems reviewed and are negative.       Lab results/tests:   Recent Results (from the past 48 hour(s))   ACCU-CHEK GLUCOSE    Collection Time: 08/19/18 12:47 PM   Result Value Ref Range    Glucose - Accu-Ck 121 (H) 65 - 99 mg/dL   ACCU-CHEK GLUCOSE    Collection Time: 08/19/18  5:43 PM   Result Value Ref Range    Glucose - Accu-Ck 151 (H) 65 - 99 mg/dL   ACCU-CHEK GLUCOSE    Collection Time: 08/19/18  9:30 PM   Result Value Ref Range    Glucose - Accu-Ck 151 (H) 65 - 99  mg/dL   BASIC METABOLIC PANEL    Collection Time: 08/20/18  4:24 AM   Result Value Ref Range    Sodium 139 135 - 145 mmol/L    Potassium 3.7 3.6 - 5.5 mmol/L    Chloride 107 96 - 112 mmol/L    Co2 25 20 - 33 mmol/L    Glucose 105 (H) 65 - 99 mg/dL    Bun 17 8 - 22 mg/dL    Creatinine 0.81 0.50 - 1.40 mg/dL    Calcium 9.4 8.5 - 10.5 mg/dL    Anion Gap 7.0 0.0 - 11.9   ESTIMATED GFR    Collection Time: 08/20/18  4:24 AM   Result Value Ref Range    GFR If African American >60 >60 mL/min/1.73 m 2    GFR If Non African American >60 >60 mL/min/1.73 m 2   ACCU-CHEK GLUCOSE    Collection Time: 08/20/18  5:32 AM   Result Value Ref Range    Glucose - Accu-Ck 95 65 - 99 mg/dL   BASIC METABOLIC PANEL    Collection Time: 08/21/18  4:11 AM   Result Value Ref Range    Sodium 141 135 - 145 mmol/L    Potassium 3.2 (L) 3.6 - 5.5 mmol/L    Chloride 107 96 - 112 mmol/L    Co2 24 20 - 33 mmol/L    Glucose 61 (L) 65 - 99 mg/dL    Bun 16 8 - 22 mg/dL    Creatinine 0.74 0.50 - 1.40 mg/dL    Calcium 9.0 8.5 - 10.5 mg/dL    Anion Gap 10.0 0.0 - 11.9   ESTIMATED GFR    Collection Time: 08/21/18  4:11 AM   Result Value Ref Range    GFR If African American >60 >60 mL/min/1.73 m 2    GFR If Non African American >60 >60 mL/min/1.73 m 2              Assessment:  Schizophrenia  Mood disorder unspecified   Osteomyeltits due to type 2 DM  Uncontrolled DM        Plan:  legal hold: extended  Increasing abilify to 10mg po daily, giving additional 5mg po today.     Anticipated F/U: within 48 hours.

## 2018-08-21 NOTE — PROGRESS NOTES
FSBS 65 at 0628. 4 oz orange juice x 2 provided. FSBS 87 at 0655. Provided 4 more ounces of orange juice as well as mike crackers and peanut butter.

## 2018-08-21 NOTE — CARE PLAN
Problem: Pain Management  Goal: Pain level will decrease to patient's comfort goal  Outcome: PROGRESSING AS EXPECTED  Keep pain at or below patient comfort goal of 3 or less, administer pain medication as appropriate, float right foot with pillows.    Problem: Psychosocial needs  Goal: Anxiety reduction  Outcome: NOT MET  Encourage patient to voice feelings, sitter at bedside, tv for distraction.

## 2018-08-22 LAB
ANION GAP SERPL CALC-SCNC: 6 MMOL/L (ref 0–11.9)
BASOPHILS # BLD AUTO: 0.7 % (ref 0–1.8)
BASOPHILS # BLD: 0.08 K/UL (ref 0–0.12)
BUN SERPL-MCNC: 14 MG/DL (ref 8–22)
CALCIUM SERPL-MCNC: 9 MG/DL (ref 8.5–10.5)
CHLORIDE SERPL-SCNC: 106 MMOL/L (ref 96–112)
CO2 SERPL-SCNC: 25 MMOL/L (ref 20–33)
CREAT SERPL-MCNC: 0.79 MG/DL (ref 0.5–1.4)
EOSINOPHIL # BLD AUTO: 0.37 K/UL (ref 0–0.51)
EOSINOPHIL NFR BLD: 3.4 % (ref 0–6.9)
ERYTHROCYTE [DISTWIDTH] IN BLOOD BY AUTOMATED COUNT: 43.7 FL (ref 35.9–50)
GLUCOSE BLD-MCNC: 101 MG/DL (ref 65–99)
GLUCOSE BLD-MCNC: 112 MG/DL (ref 65–99)
GLUCOSE SERPL-MCNC: 92 MG/DL (ref 65–99)
HCT VFR BLD AUTO: 41 % (ref 42–52)
HGB BLD-MCNC: 13.6 G/DL (ref 14–18)
IMM GRANULOCYTES # BLD AUTO: 0.05 K/UL (ref 0–0.11)
IMM GRANULOCYTES NFR BLD AUTO: 0.5 % (ref 0–0.9)
LYMPHOCYTES # BLD AUTO: 1.86 K/UL (ref 1–4.8)
LYMPHOCYTES NFR BLD: 17.1 % (ref 22–41)
MCH RBC QN AUTO: 28.3 PG (ref 27–33)
MCHC RBC AUTO-ENTMCNC: 33.2 G/DL (ref 33.7–35.3)
MCV RBC AUTO: 85.4 FL (ref 81.4–97.8)
MONOCYTES # BLD AUTO: 0.62 K/UL (ref 0–0.85)
MONOCYTES NFR BLD AUTO: 5.7 % (ref 0–13.4)
NEUTROPHILS # BLD AUTO: 7.91 K/UL (ref 1.82–7.42)
NEUTROPHILS NFR BLD: 72.6 % (ref 44–72)
NRBC # BLD AUTO: 0 K/UL
NRBC BLD-RTO: 0 /100 WBC
PLATELET # BLD AUTO: 476 K/UL (ref 164–446)
PMV BLD AUTO: 9.5 FL (ref 9–12.9)
POTASSIUM SERPL-SCNC: 3.9 MMOL/L (ref 3.6–5.5)
RBC # BLD AUTO: 4.8 M/UL (ref 4.7–6.1)
SODIUM SERPL-SCNC: 137 MMOL/L (ref 135–145)
WBC # BLD AUTO: 10.9 K/UL (ref 4.8–10.8)

## 2018-08-22 PROCEDURE — 700111 HCHG RX REV CODE 636 W/ 250 OVERRIDE (IP): Performed by: HOSPITALIST

## 2018-08-22 PROCEDURE — G8979 MOBILITY GOAL STATUS: HCPCS | Mod: CI

## 2018-08-22 PROCEDURE — 700102 HCHG RX REV CODE 250 W/ 637 OVERRIDE(OP): Performed by: INTERNAL MEDICINE

## 2018-08-22 PROCEDURE — G8980 MOBILITY D/C STATUS: HCPCS | Mod: CI

## 2018-08-22 PROCEDURE — 82962 GLUCOSE BLOOD TEST: CPT

## 2018-08-22 PROCEDURE — 36415 COLL VENOUS BLD VENIPUNCTURE: CPT

## 2018-08-22 PROCEDURE — 770001 HCHG ROOM/CARE - MED/SURG/GYN PRIV*

## 2018-08-22 PROCEDURE — 97116 GAIT TRAINING THERAPY: CPT

## 2018-08-22 PROCEDURE — A9270 NON-COVERED ITEM OR SERVICE: HCPCS | Performed by: HOSPITALIST

## 2018-08-22 PROCEDURE — 700111 HCHG RX REV CODE 636 W/ 250 OVERRIDE (IP): Performed by: INTERNAL MEDICINE

## 2018-08-22 PROCEDURE — 700105 HCHG RX REV CODE 258: Performed by: HOSPITALIST

## 2018-08-22 PROCEDURE — 80048 BASIC METABOLIC PNL TOTAL CA: CPT

## 2018-08-22 PROCEDURE — 97530 THERAPEUTIC ACTIVITIES: CPT

## 2018-08-22 PROCEDURE — 700102 HCHG RX REV CODE 250 W/ 637 OVERRIDE(OP): Performed by: HOSPITALIST

## 2018-08-22 PROCEDURE — A9270 NON-COVERED ITEM OR SERVICE: HCPCS | Performed by: INTERNAL MEDICINE

## 2018-08-22 PROCEDURE — A9270 NON-COVERED ITEM OR SERVICE: HCPCS | Performed by: PSYCHIATRY & NEUROLOGY

## 2018-08-22 PROCEDURE — 85025 COMPLETE CBC W/AUTO DIFF WBC: CPT

## 2018-08-22 PROCEDURE — 700102 HCHG RX REV CODE 250 W/ 637 OVERRIDE(OP): Performed by: PSYCHIATRY & NEUROLOGY

## 2018-08-22 RX ADMIN — Medication 2 TABLET: at 06:04

## 2018-08-22 RX ADMIN — METFORMIN HYDROCHLORIDE 850 MG: 850 TABLET ORAL at 12:21

## 2018-08-22 RX ADMIN — LISINOPRIL 40 MG: 20 TABLET ORAL at 06:04

## 2018-08-22 RX ADMIN — AMPICILLIN AND SULBACTAM 3 G: 2; 1 INJECTION, POWDER, FOR SOLUTION INTRAVENOUS at 23:46

## 2018-08-22 RX ADMIN — ATORVASTATIN CALCIUM 20 MG: 20 TABLET, FILM COATED ORAL at 06:04

## 2018-08-22 RX ADMIN — METFORMIN HYDROCHLORIDE 850 MG: 850 TABLET ORAL at 06:04

## 2018-08-22 RX ADMIN — AMPICILLIN AND SULBACTAM 3 G: 2; 1 INJECTION, POWDER, FOR SOLUTION INTRAVENOUS at 06:04

## 2018-08-22 RX ADMIN — ENOXAPARIN SODIUM 40 MG: 100 INJECTION SUBCUTANEOUS at 06:04

## 2018-08-22 RX ADMIN — INSULIN GLARGINE 30 UNITS: 100 INJECTION, SOLUTION SUBCUTANEOUS at 18:24

## 2018-08-22 RX ADMIN — METFORMIN HYDROCHLORIDE 850 MG: 850 TABLET ORAL at 18:27

## 2018-08-22 RX ADMIN — AMLODIPINE BESYLATE 5 MG: 10 TABLET ORAL at 06:04

## 2018-08-22 RX ADMIN — AMPICILLIN AND SULBACTAM 3 G: 2; 1 INJECTION, POWDER, FOR SOLUTION INTRAVENOUS at 12:20

## 2018-08-22 RX ADMIN — AMPICILLIN AND SULBACTAM 3 G: 2; 1 INJECTION, POWDER, FOR SOLUTION INTRAVENOUS at 01:02

## 2018-08-22 RX ADMIN — AMPICILLIN AND SULBACTAM 3 G: 2; 1 INJECTION, POWDER, FOR SOLUTION INTRAVENOUS at 18:26

## 2018-08-22 RX ADMIN — ARIPIPRAZOLE 10 MG: 10 TABLET ORAL at 06:04

## 2018-08-22 ASSESSMENT — COGNITIVE AND FUNCTIONAL STATUS - GENERAL
WALKING IN HOSPITAL ROOM: A LITTLE
MOBILITY SCORE: 22
SUGGESTED CMS G CODE MODIFIER MOBILITY: CJ
CLIMB 3 TO 5 STEPS WITH RAILING: A LITTLE

## 2018-08-22 ASSESSMENT — ENCOUNTER SYMPTOMS
ABDOMINAL PAIN: 0
SHORTNESS OF BREATH: 0
CONSTIPATION: 0
WEAKNESS: 0
DEPRESSION: 0

## 2018-08-22 ASSESSMENT — PAIN SCALES - GENERAL
PAINLEVEL_OUTOF10: 0

## 2018-08-22 ASSESSMENT — GAIT ASSESSMENTS
DISTANCE (FEET): 250
GAIT LEVEL OF ASSIST: SUPERVISED

## 2018-08-22 NOTE — CARE PLAN
Problem: Communication  Goal: The ability to communicate needs accurately and effectively will improve  Outcome: PROGRESSING SLOWER THAN EXPECTED  Pt has flat affect and reluctance to answer questions    Problem: Infection  Goal: Will remain free from infection  Outcome: PROGRESSING AS EXPECTED  Continuing antibiotics and monitoring labs

## 2018-08-22 NOTE — PROGRESS NOTES
Assumed care of patient at 1900    Pt is A&O 4. Pt has flat affect. Pt on legal hold. Order on chart. Sitter at bedside.  Denies pain.   Denies nausea  Tolerating Diabetic Diet  Right foot dressing clean, dry and intact  Voiding without difficulty, + flatus  Up with standby assist and boot, partial weight bearing  SCD's refused  Reviewed plan of care with patient, bed in lowest position and locked, pt resting comfortably now, call light within reach, all needs met at this time

## 2018-08-22 NOTE — PROGRESS NOTES
Bedside report received.    Assessment complete.  A&O x 4. Patient calls appropriately. Sitter at bedside. Patient on legal hold.  Patient up self.   Patient denies pain at this time.  Denies N&V. Tolerating regular, diabetic diet.  Wounds x2 R foot. Dressing in place.  + void, + flatus  Patient denies SOB.    Review plan with of care with patient. Call light and personal belongings with in reach. Hourly rounding in place. All needs met at this time.

## 2018-08-22 NOTE — DISCHARGE PLANNING
Legal Hold    Referral: Legal Hold Court     Intervention: Pt presented for legal hold meeting with .  advised pt will meet with court MD's via telemedicine monitor to contest the legal hold.      Plan: Pt will present to telemedicine mental health to meet with court physicians. Will call bedside RN once time has been determined

## 2018-08-22 NOTE — THERAPY
"Physical Therapy Treatment completed.   Bed Mobility:  Supine to Sit: Modified Independent  Transfers: Sit to Stand: Supervised  Gait: Level Of Assist: Supervised with No Equipment Needed       Discharge Recommendations: Equipment: No Equipment Needed.     See \"Rehab Therapy-Acute\" Patient Summary Report for complete documentation.     Pt progressing well w/ therapy. Pt currently at a Roxie-SPV level for all mobility. He was able to don/doff offloading shoe independently. He is able to ambulate community distances w/ no AD. He has met all therapy goals and no longer requires skilled acute physical therapy. Recommend pt continue ambulating w/ nursing staff.  "

## 2018-08-22 NOTE — DISCHARGE PLANNING
Legal Hold    Referral: Legal Hold Court     Intervention: Pt met with court MD's via telemedicine monitor to contest the legal hold.     Court MD's released pt from legal hold. Pt is aware that cannot leave the hospital until Stipulation and Order releasing from hold is received.

## 2018-08-23 PROBLEM — E87.6 HYPOKALEMIA: Status: ACTIVE | Noted: 2018-08-23

## 2018-08-23 LAB
ANION GAP SERPL CALC-SCNC: 9 MMOL/L (ref 0–11.9)
BUN SERPL-MCNC: 14 MG/DL (ref 8–22)
CALCIUM SERPL-MCNC: 9.1 MG/DL (ref 8.5–10.5)
CHLORIDE SERPL-SCNC: 109 MMOL/L (ref 96–112)
CO2 SERPL-SCNC: 23 MMOL/L (ref 20–33)
CREAT SERPL-MCNC: 0.74 MG/DL (ref 0.5–1.4)
GLUCOSE SERPL-MCNC: 73 MG/DL (ref 65–99)
MAGNESIUM SERPL-MCNC: 1.6 MG/DL (ref 1.5–2.5)
POTASSIUM SERPL-SCNC: 3.4 MMOL/L (ref 3.6–5.5)
SODIUM SERPL-SCNC: 141 MMOL/L (ref 135–145)

## 2018-08-23 PROCEDURE — 99232 SBSQ HOSP IP/OBS MODERATE 35: CPT | Performed by: INTERNAL MEDICINE

## 2018-08-23 PROCEDURE — 700102 HCHG RX REV CODE 250 W/ 637 OVERRIDE(OP): Performed by: PSYCHIATRY & NEUROLOGY

## 2018-08-23 PROCEDURE — 700105 HCHG RX REV CODE 258: Performed by: HOSPITALIST

## 2018-08-23 PROCEDURE — 36415 COLL VENOUS BLD VENIPUNCTURE: CPT

## 2018-08-23 PROCEDURE — 700102 HCHG RX REV CODE 250 W/ 637 OVERRIDE(OP): Performed by: INTERNAL MEDICINE

## 2018-08-23 PROCEDURE — A9270 NON-COVERED ITEM OR SERVICE: HCPCS | Performed by: INTERNAL MEDICINE

## 2018-08-23 PROCEDURE — 83735 ASSAY OF MAGNESIUM: CPT

## 2018-08-23 PROCEDURE — 700105 HCHG RX REV CODE 258

## 2018-08-23 PROCEDURE — 700102 HCHG RX REV CODE 250 W/ 637 OVERRIDE(OP): Performed by: HOSPITALIST

## 2018-08-23 PROCEDURE — 770001 HCHG ROOM/CARE - MED/SURG/GYN PRIV*

## 2018-08-23 PROCEDURE — A9270 NON-COVERED ITEM OR SERVICE: HCPCS | Performed by: HOSPITALIST

## 2018-08-23 PROCEDURE — 80048 BASIC METABOLIC PNL TOTAL CA: CPT

## 2018-08-23 PROCEDURE — A9270 NON-COVERED ITEM OR SERVICE: HCPCS | Performed by: PSYCHIATRY & NEUROLOGY

## 2018-08-23 PROCEDURE — 700111 HCHG RX REV CODE 636 W/ 250 OVERRIDE (IP): Performed by: HOSPITALIST

## 2018-08-23 PROCEDURE — 700111 HCHG RX REV CODE 636 W/ 250 OVERRIDE (IP): Performed by: INTERNAL MEDICINE

## 2018-08-23 RX ORDER — SODIUM CHLORIDE 9 MG/ML
INJECTION, SOLUTION INTRAVENOUS
Status: COMPLETED
Start: 2018-08-23 | End: 2018-08-23

## 2018-08-23 RX ORDER — POTASSIUM CHLORIDE 20 MEQ/1
40 TABLET, EXTENDED RELEASE ORAL DAILY
Status: DISCONTINUED | OUTPATIENT
Start: 2018-08-23 | End: 2018-08-24 | Stop reason: HOSPADM

## 2018-08-23 RX ORDER — SODIUM CHLORIDE 9 MG/ML
INJECTION, SOLUTION INTRAVENOUS
Status: ACTIVE
Start: 2018-08-23 | End: 2018-08-23

## 2018-08-23 RX ADMIN — AMPICILLIN AND SULBACTAM 3 G: 2; 1 INJECTION, POWDER, FOR SOLUTION INTRAVENOUS at 17:50

## 2018-08-23 RX ADMIN — AMPICILLIN AND SULBACTAM 3 G: 2; 1 INJECTION, POWDER, FOR SOLUTION INTRAVENOUS at 05:37

## 2018-08-23 RX ADMIN — METFORMIN HYDROCHLORIDE 850 MG: 850 TABLET ORAL at 05:36

## 2018-08-23 RX ADMIN — INSULIN GLARGINE 30 UNITS: 100 INJECTION, SOLUTION SUBCUTANEOUS at 17:54

## 2018-08-23 RX ADMIN — METFORMIN HYDROCHLORIDE 850 MG: 850 TABLET ORAL at 11:00

## 2018-08-23 RX ADMIN — ARIPIPRAZOLE 10 MG: 10 TABLET ORAL at 05:36

## 2018-08-23 RX ADMIN — Medication 2 TABLET: at 05:36

## 2018-08-23 RX ADMIN — SODIUM CHLORIDE 500 ML: 9 INJECTION, SOLUTION INTRAVENOUS at 05:37

## 2018-08-23 RX ADMIN — ATORVASTATIN CALCIUM 20 MG: 20 TABLET, FILM COATED ORAL at 05:36

## 2018-08-23 RX ADMIN — LISINOPRIL 40 MG: 20 TABLET ORAL at 05:36

## 2018-08-23 RX ADMIN — METFORMIN HYDROCHLORIDE 850 MG: 850 TABLET ORAL at 17:48

## 2018-08-23 RX ADMIN — SODIUM CHLORIDE 500 ML: 9 INJECTION, SOLUTION INTRAVENOUS at 17:47

## 2018-08-23 RX ADMIN — POTASSIUM CHLORIDE 40 MEQ: 1500 TABLET, EXTENDED RELEASE ORAL at 11:00

## 2018-08-23 RX ADMIN — Medication 1 TABLET: at 17:48

## 2018-08-23 RX ADMIN — ENOXAPARIN SODIUM 40 MG: 100 INJECTION SUBCUTANEOUS at 05:37

## 2018-08-23 RX ADMIN — AMPICILLIN AND SULBACTAM 3 G: 2; 1 INJECTION, POWDER, FOR SOLUTION INTRAVENOUS at 11:02

## 2018-08-23 RX ADMIN — AMLODIPINE BESYLATE 5 MG: 10 TABLET ORAL at 05:37

## 2018-08-23 ASSESSMENT — ENCOUNTER SYMPTOMS
CONSTIPATION: 0
WEAKNESS: 0
SHORTNESS OF BREATH: 0
DEPRESSION: 0
ABDOMINAL PAIN: 0

## 2018-08-23 ASSESSMENT — PAIN SCALES - GENERAL
PAINLEVEL_OUTOF10: 0

## 2018-08-23 NOTE — PROGRESS NOTES
Assumed care of patient at 1900.    A&O x 4. Pt's affect is much different from previous. Patient is responding to questions with full sentences and making jokes.     Patient calls appropriately. Sitter at bedside. Patient on legal hold.  Patient up self w/ boot, partial WB to heel of R foot.  Patient denies pain at this time.  Denies N&V. Tolerating diet.  Wounds x2 R foot. Dressing in place.  + void, + flatus  Patient denies SOB.     Review plan with of care with patient. Call light and personal belongings with in reach. Hourly rounding in place. All needs met at this time.

## 2018-08-23 NOTE — DISCHARGE PLANNING
Received Stipulation and Order from the court releasing pt from legal hold. Sent copy to unit LSW.

## 2018-08-23 NOTE — CARE PLAN
Problem: Communication  Goal: The ability to communicate needs accurately and effectively will improve  Outcome: PROGRESSING AS EXPECTED  Patient calls appropriately.   Sitter at bedside      Problem: Respiratory:  Goal: Respiratory status will improve  Outcome: PROGRESSING AS EXPECTED  Patient not requiring supplemental O2

## 2018-08-23 NOTE — PROGRESS NOTES
[facility]    Date of Service: 2018    Chief Complaint  62 y.o. male admitted 2018 with DM, HTN, HLD who presented with right foot osteomyelitis s/p 4th toe amputation . With suicidal ideation.    Interval Problem Update  No acute issues or complaints overnight.      Consultants/Specialty  ID  Psych  Ortho - [facility]    Disposition  Legal hold to be d/c'ed?  Wound care changes q48h      Review of Systems   Respiratory: Negative for shortness of breath.    Cardiovascular: Negative for chest pain.   Gastrointestinal: Negative for abdominal pain and constipation.   Musculoskeletal: Negative for joint pain.   Neurological: Negative for weakness.   Psychiatric/Behavioral: Negative for depression.      Physical Exam  Laboratory/Imaging   Hemodynamics  Temp (24hrs), Av.5 °C (97.7 °F), Min:36.1 °C (96.9 °F), Max:36.8 °C (98.3 °F)   Temperature: 36.4 °C (97.5 °F)  Pulse  Av  Min: 59  Max: 122    Blood Pressure: 114/72      Respiratory      Respiration: 16, Pulse Oximetry: 96 %        RUL Breath Sounds: Clear, RML Breath Sounds: Clear, RLL Breath Sounds: Diminished, SETPH Breath Sounds: Clear, LLL Breath Sounds: Diminished    Fluids    Intake/Output Summary (Last 24 hours) at 18 1141  Last data filed at 18 0330   Gross per 24 hour   Intake              390 ml   Output                0 ml   Net              390 ml       Nutrition  Orders Placed This Encounter   Procedures   • Diet Order Diabetic     Standing Status:   Standing     Number of Occurrences:   1     Order Specific Question:   Diet:     Answer:   Diabetic [3]     Physical Exam   Constitutional: He is oriented to person, place, and time. He appears well-developed and well-nourished. He is cooperative.   HENT:   Head: Normocephalic and atraumatic.   Eyes: Conjunctivae are normal. Right eye exhibits no discharge. Left eye exhibits no discharge.   Cardiovascular: Normal rate and regular rhythm.    Pulmonary/Chest:  Effort normal and breath sounds normal. He has no wheezes.   Abdominal: Soft. There is no tenderness.   Musculoskeletal:   Right foot in clean and intact dressings   Neurological: He is alert and oriented to person, place, and time.   Skin:   Right foot plantar ulcers and amputation site are dry   Psychiatric: He is withdrawn.   Nursing note and vitals reviewed.      Recent Labs      08/22/18   1145   WBC  10.9*   RBC  4.80   HEMOGLOBIN  13.6*   HEMATOCRIT  41.0*   MCV  85.4   MCH  28.3   MCHC  33.2*   RDW  43.7   PLATELETCT  476*   MPV  9.5     Recent Labs      08/21/18   0411  08/22/18   0459  08/23/18   0444   SODIUM  141  137  141   POTASSIUM  3.2*  3.9  3.4*   CHLORIDE  107  106  109   CO2  24  25  23   GLUCOSE  61*  92  73   BUN  16  14  14   CREATININE  0.74  0.79  0.74   CALCIUM  9.0  9.0  9.1                      Assessment/Plan     * Osteomyelitis due to type 2 diabetes mellitus (HCC)- (present on admission)   Assessment & Plan    - ID and ortho consulted, recs appreciated  -  limb preservation evaluation completed  - MRI of right foot showing 4th and 5th toe osteomyelitis, septic arthritis, and cellulitis  - 8/12 right 4th toe amputation done  - Wound Cx + group b strep, strep viridans;  Cont unasyn until 9/10 per ID. He does not want long-term antibiotics. ID says can go on Augmentin if so        Uncontrolled diabetes mellitus (HCC)- (present on admission)   Assessment & Plan    Hyperglycemia  - cont lantus 30U qPM, ISS, hypoglycemic protocol, diabetic diet  - A1c is 12  - diabetes education  - cont home metformin  - improved control  - hypoglycemic protocol on board        Hypertension- (present on admission)   Assessment & Plan    continue Prinivil and amlodipine        Hypokalemia   Assessment & Plan    - replete and monitor  - Mg levels pending        Suicidal ideation- (present on admission)   Assessment & Plan    - with Unspecified Mood Disorder and Schizophrenia  - pt placed on legal hold 8/11  "when trying to leave AMA and his plan for worsening infection was to \"kill himself\" and saying he has plenty of guns at home  - psych following; awaiting updated recommendations  - legal hold to be d/c'ed?  - cont Abilify        Hyperlipidemia- (present on admission)   Assessment & Plan    continue home Lipitor.          Quality-Core Measures   Reviewed items::  EKG reviewed, Labs reviewed, Medications reviewed and Radiology images reviewed  Saavedra catheter::  No Saavedra  DVT prophylaxis pharmacological::  Enoxaparin (Lovenox)        "

## 2018-08-23 NOTE — PROGRESS NOTES
This Rn discussed discontinuation of legal hold with Dr. Tse.  Dr. Tse would like psych team to see patient prior to discharge for their recommendation.  This RN paged psych team again.  Awaiting call back.

## 2018-08-23 NOTE — PROGRESS NOTES
LIMB PRESERVATION SERVICE      8/12/18-R 4th toe amp and R foot I&D by Dr. Giles    Patient is confused, bedside sitter at all times  Status of legal hold in question  CM working on transfer to [facility]    Surgical site with sutures, well approximated, no drainage  Plantar foot wounds with eschar to wound beds, thick periwound callus, and desquamation/   PROCEDURE- Scalpel used to excise eschar from wound beds and callus from periwound.  Additional wound exposed with removal of callus.  Total area debrided ~ 15 cm2, wound beds debrided into SQ layer, callus to skin level.  Wounds cleansed with NS, Aquacel Ag to wound beds, covered with foam dressing, secured with hypafix tape..  Adaptic over toe amp incision.  Foor wrapped with kerlix and then Ace wrap.     Offloading boot when ambulating      Assessment/Plan:       1. Diabetic foot infection- OM and abscess.  S/p I&D and 4th toe amp.  Incision well approximated.  Ulcers x 3 to plantar foot, 3rd ulcer exposed with removal of callus..   Wound care orders written.  Offloading boot when ambulating. Op cxs + for Strep.  ID managing abx.  Sutures out 3 weeks post-op.  Pt to f/u with Dr. Starks  Discharge complicated by psych issues.      2. Uncontrolled type 2 diabetes- A1c 12. % this admission.  Pt confused, diabetes education inappropriate at this time

## 2018-08-23 NOTE — PROGRESS NOTES
AO x 4, when asked patient stated no thoughts of harming self.  Foot dressing in tact to RLE.  Boot in use when walking.  Denies pain.  +BM yesterday per patient.  Legal hold discontinued, discussed with case coordination.  This RN paging hospitalist to provide update regarding discontinuation of legal hold. Plan of care discussed, questions answered, verbalized understanding.

## 2018-08-23 NOTE — CARE PLAN
Problem: Communication  Goal: The ability to communicate needs accurately and effectively will improve  Outcome: PROGRESSING AS EXPECTED  Pt's mood is much more elevated tonight. Patient responds to questions and agrees to call for assistance.    Problem: Safety  Goal: Will remain free from injury  Outcome: PROGRESSING AS EXPECTED  Sitter at bedside. Frequent rounding in place. Pt instructed in use of call light.

## 2018-08-24 ENCOUNTER — HOME HEALTH ADMISSION (OUTPATIENT)
Dept: HOME HEALTH SERVICES | Facility: HOME HEALTHCARE | Age: 63
End: 2018-08-24
Payer: MEDICAID

## 2018-08-24 VITALS
DIASTOLIC BLOOD PRESSURE: 82 MMHG | OXYGEN SATURATION: 94 % | TEMPERATURE: 97.6 F | SYSTOLIC BLOOD PRESSURE: 117 MMHG | HEART RATE: 70 BPM | HEIGHT: 71 IN | WEIGHT: 217.81 LBS | BODY MASS INDEX: 30.49 KG/M2 | RESPIRATION RATE: 18 BRPM

## 2018-08-24 LAB
ANION GAP SERPL CALC-SCNC: 9 MMOL/L (ref 0–11.9)
BUN SERPL-MCNC: 13 MG/DL (ref 8–22)
CALCIUM SERPL-MCNC: 9.1 MG/DL (ref 8.5–10.5)
CHLORIDE SERPL-SCNC: 109 MMOL/L (ref 96–112)
CO2 SERPL-SCNC: 24 MMOL/L (ref 20–33)
CREAT SERPL-MCNC: 0.86 MG/DL (ref 0.5–1.4)
GLUCOSE SERPL-MCNC: 98 MG/DL (ref 65–99)
POTASSIUM SERPL-SCNC: 3.5 MMOL/L (ref 3.6–5.5)
SODIUM SERPL-SCNC: 142 MMOL/L (ref 135–145)

## 2018-08-24 PROCEDURE — 700111 HCHG RX REV CODE 636 W/ 250 OVERRIDE (IP): Performed by: INTERNAL MEDICINE

## 2018-08-24 PROCEDURE — 700111 HCHG RX REV CODE 636 W/ 250 OVERRIDE (IP): Performed by: HOSPITALIST

## 2018-08-24 PROCEDURE — A9270 NON-COVERED ITEM OR SERVICE: HCPCS | Performed by: HOSPITALIST

## 2018-08-24 PROCEDURE — 700105 HCHG RX REV CODE 258: Performed by: HOSPITALIST

## 2018-08-24 PROCEDURE — 700102 HCHG RX REV CODE 250 W/ 637 OVERRIDE(OP): Performed by: HOSPITALIST

## 2018-08-24 PROCEDURE — 80048 BASIC METABOLIC PNL TOTAL CA: CPT

## 2018-08-24 PROCEDURE — 700105 HCHG RX REV CODE 258: Performed by: INTERNAL MEDICINE

## 2018-08-24 PROCEDURE — 99232 SBSQ HOSP IP/OBS MODERATE 35: CPT | Performed by: INTERNAL MEDICINE

## 2018-08-24 PROCEDURE — 36415 COLL VENOUS BLD VENIPUNCTURE: CPT

## 2018-08-24 PROCEDURE — A9270 NON-COVERED ITEM OR SERVICE: HCPCS | Performed by: INTERNAL MEDICINE

## 2018-08-24 PROCEDURE — A9270 NON-COVERED ITEM OR SERVICE: HCPCS | Performed by: PSYCHIATRY & NEUROLOGY

## 2018-08-24 PROCEDURE — 700102 HCHG RX REV CODE 250 W/ 637 OVERRIDE(OP): Performed by: PSYCHIATRY & NEUROLOGY

## 2018-08-24 PROCEDURE — 99239 HOSP IP/OBS DSCHRG MGMT >30: CPT | Performed by: INTERNAL MEDICINE

## 2018-08-24 PROCEDURE — 700102 HCHG RX REV CODE 250 W/ 637 OVERRIDE(OP): Performed by: INTERNAL MEDICINE

## 2018-08-24 RX ORDER — AMLODIPINE BESYLATE 5 MG/1
5 TABLET ORAL DAILY
Qty: 30 TAB | Refills: 0 | Status: SHIPPED | OUTPATIENT
Start: 2018-08-25 | End: 2018-10-10 | Stop reason: SDUPTHER

## 2018-08-24 RX ORDER — AMOXICILLIN AND CLAVULANATE POTASSIUM 875; 125 MG/1; MG/1
1 TABLET, FILM COATED ORAL 2 TIMES DAILY
Qty: 34 TAB | Refills: 0 | Status: SHIPPED | OUTPATIENT
Start: 2018-08-24 | End: 2018-09-10

## 2018-08-24 RX ORDER — ARIPIPRAZOLE 10 MG/1
10 TABLET ORAL DAILY
Qty: 30 TAB | Refills: 0 | Status: SHIPPED | OUTPATIENT
Start: 2018-08-25 | End: 2019-07-08 | Stop reason: SDUPTHER

## 2018-08-24 RX ADMIN — AMPICILLIN AND SULBACTAM 3 G: 2; 1 INJECTION, POWDER, FOR SOLUTION INTRAVENOUS at 13:05

## 2018-08-24 RX ADMIN — METFORMIN HYDROCHLORIDE 850 MG: 850 TABLET ORAL at 13:05

## 2018-08-24 RX ADMIN — AMPICILLIN AND SULBACTAM 3 G: 2; 1 INJECTION, POWDER, FOR SOLUTION INTRAVENOUS at 05:11

## 2018-08-24 RX ADMIN — AMPICILLIN AND SULBACTAM 3 G: 2; 1 INJECTION, POWDER, FOR SOLUTION INTRAVENOUS at 00:01

## 2018-08-24 RX ADMIN — POTASSIUM CHLORIDE 40 MEQ: 1500 TABLET, EXTENDED RELEASE ORAL at 05:13

## 2018-08-24 RX ADMIN — LISINOPRIL 40 MG: 20 TABLET ORAL at 05:13

## 2018-08-24 RX ADMIN — ARIPIPRAZOLE 10 MG: 10 TABLET ORAL at 05:12

## 2018-08-24 RX ADMIN — ATORVASTATIN CALCIUM 20 MG: 20 TABLET, FILM COATED ORAL at 05:13

## 2018-08-24 RX ADMIN — ENOXAPARIN SODIUM 40 MG: 100 INJECTION SUBCUTANEOUS at 05:11

## 2018-08-24 RX ADMIN — Medication 2 TABLET: at 05:12

## 2018-08-24 RX ADMIN — METFORMIN HYDROCHLORIDE 850 MG: 850 TABLET ORAL at 06:27

## 2018-08-24 RX ADMIN — AMLODIPINE BESYLATE 5 MG: 10 TABLET ORAL at 05:13

## 2018-08-24 ASSESSMENT — ENCOUNTER SYMPTOMS
DEPRESSION: 0
CONSTIPATION: 0
ABDOMINAL PAIN: 0
SHORTNESS OF BREATH: 0
WEAKNESS: 0

## 2018-08-24 ASSESSMENT — PAIN SCALES - GENERAL
PAINLEVEL_OUTOF10: 0

## 2018-08-24 NOTE — CARE PLAN
Problem: Safety  Goal: Will remain free from injury    Intervention: Provide assistance with mobility  Educated patient to call for assistance before getting OOB      Problem: Pain Management  Goal: Pain level will decrease to patient's comfort goal    Intervention: Follow pain managment plan developed in collaboration with patient and Interdisciplinary Team  Educated patient to verbalize when pain is not tolerable

## 2018-08-24 NOTE — DISCHARGE PLANNING
ATTN: Case Management  RE: Referral for Home Health                We would like to take this opportunity to thank you for submitting a referral for your patient to continue care with [facility]. Our skilled team is dedicated to helping all patients recover and gain independence in the home setting.            As of 8/24/18, we have accepted the above patient into our service. A [facility] clinician will be out to see the patient within 48 hours to conduct our initial visit. If you have any questions or concerns regarding the patient’s transition to Home Health, please do not hesitate to contact us. We are open for referrals 7 days a week from 8AM to 5PM at [phone].      We look forward to collaborating with you,  [facility] Team

## 2018-08-24 NOTE — DISCHARGE PLANNING
Agency/Facility Name: [facility]  Outcome: Patient Accepted   LSW Mary Jo Notified   detailed exam

## 2018-08-24 NOTE — DISCHARGE INSTRUCTIONS
Discharge Instructions    Discharged to home by taxi with self. Discharged via walking, hospital escort: Yes.  Special equipment needed: Not Applicable    Be sure to schedule a follow-up appointment with your primary care doctor or any specialists as instructed.     Discharge Plan:   Influenza Vaccine Indication: Not indicated: Previously immunized this influenza season and > 8 years of age    I understand that a diet low in cholesterol, fat, and sodium is recommended for good health. Unless I have been given specific instructions below for another diet, I accept this instruction as my diet prescription.   Other diet: regular diabetic    Special Instructions: None    · Is patient discharged on Warfarin / Coumadin?   No     Depression / Suicide Risk    As you are discharged from this [facility] facility, it is important to learn how to keep safe from harming yourself.    Recognize the warning signs:  · Abrupt changes in personality, positive or negative- including increase in energy   · Giving away possessions  · Change in eating patterns- significant weight changes-  positive or negative  · Change in sleeping patterns- unable to sleep or sleeping all the time   · Unwillingness or inability to communicate  · Depression  · Unusual sadness, discouragement and loneliness  · Talk of wanting to die  · Neglect of personal appearance   · Rebelliousness- reckless behavior  · Withdrawal from people/activities they love  · Confusion- inability to concentrate     If you or a loved one observes any of these behaviors or has concerns about self-harm, here's what you can do:  · Talk about it- your feelings and reasons for harming yourself  · Remove any means that you might use to hurt yourself (examples: pills, rope, extension cords, firearm)  · Get professional help from the community (Mental Health, Substance Abuse, psychological counseling)  · Do not be alone:Call your Safe Contact- someone whom you trust who will be there  for you.  · Call your local CRISIS HOTLINE 968-2999 or 058-976-1075  · Call your local Children's Mobile Crisis Response Team [place] (033) 837-8626 or www.Songfor  · Call the toll free National Suicide Prevention Hotlines   · National Suicide Prevention Lifeline 273-602-HQAX (4485)  · National Hope Line Network 800-SUICIDE (829-0074)    Bone and Joint Infections, Adult  Introduction  Bone infections (osteomyelitis) and joint infections (septic arthritis) occur when bacteria or other germs get inside a bone or a joint. This can happen if you have an infection in another part of your body that spreads through your blood. Germs from your skin or from outside of your body can also cause this type of infection if you have a wound or a broken bone (fracture) that breaks the skin.  Anyone can get a bone infection or joint infection. You may be more likely to get this type of infection if you have a condition, such as diabetes, that lowers your ability to fight infection or increases your chances of getting an infection. Bone and joint infections can cause damage, and they can spread to other areas of your body. They need to be treated quickly.  What are the causes?  Most bone and joint infections are caused by bacteria. They can also be caused by other germs, such as viruses and funguses.  What increases the risk?  This condition is more likely to develop in:  · People who recently had surgery, especially bone or joint surgery.  · People who have a long-term (chronic) disease, such as:  ¨ HIV (human immunodeficiency virus).  ¨ Diabetes.  ¨ Rheumatoid arthritis.  ¨ Sickle cell anemia.  · Elderly people.  · People who take medicines that block or weaken the body’s defense system (immune system).  · People who have a condition that reduces their blood flow.  · People who are on kidney dialysis.  · People who have an artificial joint.  · People who have had a joint or bone repaired with plates or screws  (surgical hardware).  · People who use or abuse IV drugs.  · People who have had trauma, such as stepping on a nail.  What are the signs or symptoms?  Symptoms vary depending on the type and location of your infection. Common symptoms of bone and joint infections include:  · Fever and chills.  · Redness and warmth.  · Swelling.  · Pain and stiffness.  · Drainage of fluid or pus near the infection.  · Weight loss and fatigue.  · Decreased ability to use a hand or foot.  How is this diagnosed?  This condition may be diagnosed based on symptoms, medical history, a physical exam, and diagnostic tests. Tests can help to identify the cause of the infection. You may have various tests, such as:  · A sample of tissue, fluid, or blood taken to be examined under a microscope.  · A procedure to remove fluid from the infected joint with a needle (joint aspiration) for testing in a lab.  · Pus or discharge swabbed from a wound for testing to identify germs and to determine what type of medicine will kill them (culture and sensitivity).  · Blood tests to look for evidence of infection and inflammation (biomarkers).  · Imaging studies to determine how severe the bone or joint infection is. These may include:  ¨ X-rays.  ¨ CT scan.  ¨ MRI.  ¨ Bone scan.  How is this treated?  Treatment depends on the cause and type of infection. Antibiotic medicines are usually the first treatment for a bone or joint infection. Treatment with antibiotics may include:  · Getting IV antibiotics. This may be done in a hospital at first. You may have to continue IV antibiotics at home for several weeks. You may also have to take antibiotics by mouth for several weeks after that.  · Taking more than one kind of antibiotic. Treatment may start with a type of antibiotic that works against many different bacteria (broad spectrum antibiotics). IV antibiotics may be changed if tests show that another type may work better.  Other treatments may  include:  · Draining fluid from the joint by placing a needle into it (aspiration).  · Surgery to remove:  ¨ Dead or dying tissue from a bone or joint.  ¨ An infected artificial joint.  ¨ Infected plates or screws that were used to repair a broken bone.  Follow these instructions at home:  · Take medicines only as directed by your health care provider.  · Take your antibiotic medicine as directed by your health care provider. Finish the antibiotic even if you start to feel better.  · Follow instructions from your health care provider about how to take IV antibiotics at home.  · Ask your health care provider if you have any restrictions on your activities.  · Keep all follow-up visits as directed by your health care provider. This is important.  Contact a health care provider if:  · You have a fever or chills.  · You have redness, warmth, pain, or swelling that returns after treatment.  Get help right away if:  · You have rapid breathing or you have trouble breathing.  · You have chest pain.  · You cannot drink fluids or make urine.  · The affected arm or leg swells, changes color, or turns blue.  This information is not intended to replace advice given to you by your health care provider. Make sure you discuss any questions you have with your health care provider.  Document Released: 12/18/2006 Document Revised: 05/25/2017 Document Reviewed: 12/16/2015  © 2017 [employer]    Toe Amputation  Toe amputation is a surgical procedure to remove all or part of a toe. You may have this procedure if:  · Tissue in your toe is dying because of poor blood supply.  · You have a severe infection in your toe.  Removing your toe keeps nearby tissue healthy. If the toe is infected, removing it helps to keep the infection from spreading.  Tell a health care provider about:  · Any allergies you have.  · All medicines you are taking, including vitamins, herbs, eye drops, creams, and over-the-counter medicines.  · Any problems you or  family members have had with anesthetic medicines.  · Any blood disorders you have.  · Any surgeries you have had.  · Any medical conditions you have.  · Whether you are pregnant or may be pregnant.  What are the risks?  Generally, this is a safe procedure. However, problems may occur, including:  · Bleeding.  · Buildup of blood and fluid (hematoma).  · Infection.  · Allergic reactions to medicines.  · Tissue death in the flap of skin (flap necrosis).  · Trouble with healing.  · Minor changes in the way that you walk (your gait).  · Feeling pain in the area that was removed (phantom pain). This is rare.  What happens before the procedure?  · Follow instructions from your health care provider about eating or drinking restrictions.  · Ask your health care provider about:  ¨ Changing or stopping your regular medicines. This is especially important if you are taking diabetes medicines or blood thinners.  ¨ Taking medicines such as aspirin and ibuprofen. These medicines can thin your blood. Do not take these medicines before your procedure if your health care provider instructs you not to.  · You may be given antibiotic medicine to help prevent infection.  · Plan to have someone take you home after the procedure.  · If you will be going home right after the procedure, plan to have someone with you for 24 hours.  What happens during the procedure?  · You will be given one or more of the following:  ¨ A medicine to help you relax (sedative).  ¨ A medicine to numb the area (local anesthetic).  ¨ A medicine to make you fall asleep (general anesthetic).  ¨ A medicine that is injected into your spine to numb the area below and slightly above the injection site (spinal anesthetic).  ¨ A medicine that is injected into an area of your body to numb everything below the injection site (regional anesthetic).  · To reduce your risk of infection:  ¨ Your health care team will wash or sanitize their hands.  ¨ Your skin will be washed  with soap.  · Your surgeon will yolie the area of your toe for removal.  · Your surgeon will make a surgical cut (incision) in your toe.  · The dead tissue and bone will be removed.  · Nerves and vessels will be tied or heated with a special tool to stop bleeding.  · The area will be drained and cleaned.  · If only part of a toe is removed, the remaining part will be covered with a flap of skin.  · The incision will be treated in one of these ways:  ¨ It will be closed with stitches (sutures).  ¨ It will be left open to heal if there is an infection.  · The incision area may be packed with gauze and covered with bandages (dressings).  · Tissue samples may be sent to a lab to be examined under a microscope.  The procedure may vary among health care providers and hospitals.  What happens after the procedure?  · Your blood pressure, heart rate, breathing rate, and blood oxygen level will be monitored often until the medicines you were given have worn off.  · Your foot will be raised up high (elevated) to relieve swelling.  · You will be monitored for pain.  · You will be given pain medicines and antibiotics.  · Your health care provider or physical therapist will help you to move around as soon as possible.  · Do not drive for 24 hours if you received a sedative.  This information is not intended to replace advice given to you by your health care provider. Make sure you discuss any questions you have with your health care provider.  Document Released: 11/28/2016 Document Revised: 08/21/2017 Document Reviewed: 09/10/2016  [employer] Interactive Patient Education © 2017 [employer]      Diabetes and Foot Care  Diabetes may cause you to have problems because of poor blood supply (circulation) to your feet and legs. This may cause the skin on your feet to become thinner, break easier, and heal more slowly. Your skin may become dry, and the skin may peel and crack. You may also have nerve damage in your legs and feet causing  decreased feeling in them. You may not notice minor injuries to your feet that could lead to infections or more serious problems. Taking care of your feet is one of the most important things you can do for yourself.  Follow these instructions at home:  · Wear shoes at all times, even in the house. Do not go barefoot. Bare feet are easily injured.  · Check your feet daily for blisters, cuts, and redness. If you cannot see the bottom of your feet, use a mirror or ask someone for help.  · Wash your feet with warm water (do not use hot water) and mild soap. Then pat your feet and the areas between your toes until they are completely dry. Do not soak your feet as this can dry your skin.  · Apply a moisturizing lotion or petroleum jelly (that does not contain alcohol and is unscented) to the skin on your feet and to dry, brittle toenails. Do not apply lotion between your toes.  · Trim your toenails straight across. Do not dig under them or around the cuticle. File the edges of your nails with an emery board or nail file.  · Do not cut corns or calluses or try to remove them with medicine.  · Wear clean socks or stockings every day. Make sure they are not too tight. Do not wear knee-high stockings since they may decrease blood flow to your legs.  · Wear shoes that fit properly and have enough cushioning. To break in new shoes, wear them for just a few hours a day. This prevents you from injuring your feet. Always look in your shoes before you put them on to be sure there are no objects inside.  · Do not cross your legs. This may decrease the blood flow to your feet.  · If you find a minor scrape, cut, or break in the skin on your feet, keep it and the skin around it clean and dry. These areas may be cleansed with mild soap and water. Do not cleanse the area with peroxide, alcohol, or iodine.  · When you remove an adhesive bandage, be sure not to damage the skin around it.  · If you have a wound, look at it several times a  day to make sure it is healing.  · Do not use heating pads or hot water bottles. They may burn your skin. If you have lost feeling in your feet or legs, you may not know it is happening until it is too late.  · Make sure your health care provider performs a complete foot exam at least annually or more often if you have foot problems. Report any cuts, sores, or bruises to your health care provider immediately.  Contact a health care provider if:  · You have an injury that is not healing.  · You have cuts or breaks in the skin.  · You have an ingrown nail.  · You notice redness on your legs or feet.  · You feel burning or tingling in your legs or feet.  · You have pain or cramps in your legs and feet.  · Your legs or feet are numb.  · Your feet always feel cold.  Get help right away if:  · There is increasing redness, swelling, or pain in or around a wound.  · There is a red line that goes up your leg.  · Pus is coming from a wound.  · You develop a fever or as directed by your health care provider.  · You notice a bad smell coming from an ulcer or wound.  This information is not intended to replace advice given to you by your health care provider. Make sure you discuss any questions you have with your health care provider.  Document Released: 12/15/2001 Document Revised: 05/25/2017 Document Reviewed: 05/27/2014  [employer] Interactive Patient Education © 2017 [employer]

## 2018-08-24 NOTE — PROGRESS NOTES
Report received from day RN, assumed Care.   Patient is AOx4, responds appropriately.      Pain controlled at this time.  Patient is tolerating diabeteic diet, denies nausea/vomiting. (Void +)  Up self with steady gait.    Patients right foot dressing is intact and boot is in use during ambulation.    Plan of care discussed, all questions answered.    Explained importance of calling before getting OOB and pt verbalizes understanding.  Call light and belongings within reach, treaded slipper socks on, bed in lowest locked position.  Hourly rounding in place, all needs met at this time

## 2018-08-24 NOTE — PROGRESS NOTES
Infectious Disease Progress Note    Author: Karmen Saucedo M.D. Date & Time of service: 2018  5:44 PM    Chief Complaint:  Follow-up for right diabetic foot infection    Interval History:   AF WBC 9.7 patient denies any complaints however is a poor historian given paranoid schizophrenia, patient threatened to leave AMA   AF no CBC no new issues to report the patient is a poor historian, sitter at bedside as patient stated he wanted to kill himself yesterday, plan for IND with possible toe amputation today  2018-T-max 99.  No new issues overnight.  Underwent surgery this morning  2018-T-max 98.3.  No new issues overnight.  8/15/2018-T-max 98.  Wants to go home and apparently has been refusing PICC .  2018-T-max 99.5.  I had a long conversation with patient but it is unclear how much registers.  WBC 8 platelets 374 creatinine 0.74  2018 T-max 98.5.  Patient says he does not want to go to snf.  Still on one-to-one observation   AF answers no to all questions   AF creat 0.74 no clinical change   AF WBC 10.9 no acute events-Legal Hold in question  Labs Reviewed, Medications Reviewed, Radiology Reviewed and Wound Reviewed.    Review of Systems:  Review of Systems   Unable to perform ROS: Psychiatric disorder       Hemodynamics:  Temp (24hrs), Av.5 °C (97.7 °F), Min:36.1 °C (96.9 °F), Max:36.8 °C (98.2 °F)  Temperature: 36.8 °C (98.2 °F)  Pulse  Av.8  Min: 59  Max: 122   Blood Pressure: 118/62       Physical Exam:  Physical Exam   Constitutional: He appears well-developed. No distress.   Disheveled   HENT:   Head: Normocephalic and atraumatic.   Eyes: Pupils are equal, round, and reactive to light. EOM are normal.   Poor eye contact   Neck: Neck supple.   Cardiovascular: Normal rate.    Irregular   Pulmonary/Chest: Effort normal. No respiratory distress.   Abdominal: Soft.   Musculoskeletal:   Right foot dressed-declined removal   Neurological: He is alert.   Skin:  No rash noted.   Psychiatric:   Flat affect   Nursing note and vitals reviewed.      Meds:    Current Facility-Administered Medications:   •  NS  •  potassium chloride SA  •  ARIPiprazole  •  metFORMIN  •  enoxaparin (LOVENOX) injection  •  ampicillin-sulbactam (UNASYN) IV  •  amLODIPine  •  lisinopril  •  atorvastatin  •  senna-docusate **AND** polyethylene glycol/lytes **AND** magnesium hydroxide **AND** bisacodyl  •  acetaminophen  •  ondansetron  •  ondansetron  •  promethazine  •  promethazine  •  prochlorperazine  •  insulin glargine  •  labetalol    Labs:  Recent Labs      08/22/18   1145   WBC  10.9*   RBC  4.80   HEMOGLOBIN  13.6*   HEMATOCRIT  41.0*   MCV  85.4   MCH  28.3   RDW  43.7   PLATELETCT  476*   MPV  9.5   NEUTSPOLYS  72.60*   LYMPHOCYTES  17.10*   MONOCYTES  5.70   EOSINOPHILS  3.40   BASOPHILS  0.70     Recent Labs      08/21/18   0411  08/22/18   0459  08/23/18   0444   SODIUM  141  137  141   POTASSIUM  3.2*  3.9  3.4*   CHLORIDE  107  106  109   CO2  24  25  23   GLUCOSE  61*  92  73   BUN  16  14  14     Recent Labs      08/21/18   0411  08/22/18   0459  08/23/18   0444   CREATININE  0.74  0.79  0.74       Imaging:  Dx-foot-complete 3+ Right    Result Date: 8/9/2018 8/9/2018 6:43 PM HISTORY/REASON FOR EXAM:  Right foot pain and swelling. Multiple open wounds. TECHNIQUE/EXAM DESCRIPTION AND NUMBER OF VIEWS: 3 views of the RIGHT foot. COMPARISON:  None. FINDINGS: No acute fracture or dislocation is present. There is a moderate hallux valgus deformity present. There is soft tissue swelling surrounding the 4th and 5th toes. There is irregularity of the 5th MTP joint and the 5th IP joint without evidence of acute erosion. This may represent prior injury or inflammation. No lytic or blastic bony defect is identified to suggest acute osteomyelitis.     1.  No acute right foot fracture or dislocation. 2.  Soft tissue swelling surrounding the 4th and 5th toes. 3.  No plain film evidence of acute  osteomyelitis. 4.  Degenerative or post inflammatory irregularity of the right 5th MTP joint and IP joint. 5.  Moderate hallux valgus deformity.      Micro:  Results     ** No results found for the last 168 hours. **          Assessment:  Active Hospital Problems    Diagnosis   • *Osteomyelitis due to type 2 diabetes mellitus (HCC) [E11.69, M86.9]   • Uncontrolled diabetes mellitus (HCC) [E11.65]   • Hypertension [I10]   • Hyponatremia [E87.1]   • Hyperlipidemia [E78.5]       Plan:  Right diabetic foot infection  Afebrile  Leukocytosis resolved  s/p right fourth toe amputation and right foot I&D on 8/12/2018  Due to foot abscess anticipate 4 weeks IV abx   Wound culture+ group B streptococcus strep viridans  Continue Unasyn through 9/10  Wound care  LPS following  Will need placement    DM 2, poorly controlled  Hemoglobin A1c 12   Maintain blood sugars less than 150 to control infection and promote wound healing    Paranoid schizophrenia  Impediment to his care and treatment  Suicidal ideation on admit

## 2018-08-24 NOTE — DISCHARGE SUMMARY
Discharge Summary    CHIEF COMPLAINT ON ADMISSION  Chief Complaint   Patient presents with   • Toe Pain     (R) 4th toe,  with swelling and discoloration noted.  first noticed 1 week ago,  started bleeding today.  toe is warm to touch,  hx of DM.    • Sent from Urgent Care       Reason for Admission  Right Foot Pain     Admission Date  8/9/2018    CODE STATUS  Full Code    HPI & HOSPITAL COURSE  This is a 62 y.o. male here with PMHx of DM II uncontrolled and HTN was admitted for diabetic foot infection and uncontrolled DM II.  Please see H&P for details regarding initial presentation and management. In summary, patient was found to have purulent drainage from open wound of right 4th toe.  Wound and blood cultures were obtained and patient was started on IV Vancomycin/Unasyn.  MRI was obtained and confirmed evidence of osteomyelitis.  LPS was consulted as well as ID.  ID switched antibiotics to Linezolid due to SUKHWINDER on admission.  Patient then stated that he wanted to leave AMA and threatened to kill himself at home, a home in which he carried mutliple guns.  Because of this threat, legal hold was placed and Psychiatry was consulted.  LPS and Orthopedics proceed with right 4th toe amputation and I&D of right foot abscess.  Wound care was provided for duration of hospital course.  Wound cultures grew Strep viridans and antibiotics were de-escalated to IV Unasyn.  Legal hold was eventually discontinued as patient was started on Abilify by Psychiatry.  Psychiatry recommends to continue Abilify upon discharge and follow up with behavioral health as outpatient.  Patient was reluctant to IV antibiotic therapy upon discharge recommended by ID.  After discussion with ID, it was ok for patient to switched to oral Augmentin and complete the total 6 week course of antibiotics through 9/10.  Wound care will continue with [facility] which was arranged for patient prior to discharge.  Regarding uncontrolled DM, patient was restarted on  home Metformin and Lantus dosing.  BG control was achieved which was suggestive of medical noncompliance.  Diabetic education and medical compliance were encouraged.  Electrolytes were corrected during hospital course.  All other chronic conditions remained stable.  At this time patient is stable for discharge with offloading boot to use when ambulating.    Therefore, he is discharged in fair and stable condition to home with organized home healthcare and close outpatient follow-up.    The patient met 2-midnight criteria for an inpatient stay at the time of discharge.    Discharge Date  8/24/2018    FOLLOW UP ITEMS POST DISCHARGE  F/U with Orthopedics in 3 weeks (Dr. Starks) for suture removal    DISCHARGE DIAGNOSES  Principal Problem:    Osteomyelitis due to type 2 diabetes mellitus (HCC) POA: Yes  Active Problems:    Hypertension POA: Yes      Overview: Reportedly HTN for a number of years and no medication for HTN for some       time.    Uncontrolled diabetes mellitus (HCC) POA: Yes    Hyperlipidemia POA: Yes    Suicidal ideation POA: Yes    Hypokalemia POA: No  Resolved Problems:    Hyponatremia POA: Unknown      FOLLOW UP  No future appointments.  No follow-up provider specified.    MEDICATIONS ON DISCHARGE     Medication List      START taking these medications      Instructions   amLODIPine 5 MG Tabs  Commonly known as:  NORVASC   Take 1 Tab by mouth every day.  Dose:  5 mg     amoxicillin-clavulanate 875-125 MG Tabs  Commonly known as:  AUGMENTIN   Take 1 Tab by mouth 2 times a day for 17 days.  Dose:  1 Tab     ARIPiprazole 10 MG Tabs  Commonly known as:  ABILIFY   Take 1 Tab by mouth every day.  Dose:  10 mg        CONTINUE taking these medications      Instructions   atorvastatin 20 MG Tabs  Commonly known as:  LIPITOR   Take 1 Tab by mouth every day.  Dose:  20 mg     Cholecalciferol 4000 units Caps   Take 1 Capsule by mouth every day.  Dose:  1 Capsule     insulin glargine 100 UNIT/ML Sopn  injection  Commonly known as:  LANTUS   Inject 30 Units as instructed every evening.  Dose:  30 Units     lisinopril 40 MG tablet  Commonly known as:  PRINIVIL, ZESTRIL   Take 1 Tab by mouth every day.  Dose:  40 mg     loratadine 10 MG Tabs  Commonly known as:  CLARITIN   Take 1 Tab by mouth every day.  Dose:  10 mg     metFORMIN 850 MG Tabs  Commonly known as:  GLUCOPHAGE   Take 1 Tab by mouth 3 times a day, with meals.  Dose:  850 mg            Allergies  No Known Allergies    DIET  Orders Placed This Encounter   Procedures   • Diet Order Diabetic     Standing Status:   Standing     Number of Occurrences:   1     Order Specific Question:   Diet:     Answer:   Diabetic [3]       ACTIVITY  As tolerated.  Weight bearing as tolerated    CONSULTATIONS  LPS   Orthopedics  Infectious Disease  Psychiatry    PROCEDURES  Right 4th Toe Amputation, trimming of callus of right plantar foot, debridement of abscess of right foot, and trimming of callous of left foot (8/12)    LABORATORY  Lab Results   Component Value Date    SODIUM 142 08/24/2018    POTASSIUM 3.5 (L) 08/24/2018    CHLORIDE 109 08/24/2018    CO2 24 08/24/2018    GLUCOSE 98 08/24/2018    BUN 13 08/24/2018    CREATININE 0.86 08/24/2018    CREATININE 1.4 06/03/2006        Lab Results   Component Value Date    WBC 10.9 (H) 08/22/2018    HEMOGLOBIN 13.6 (L) 08/22/2018    HEMATOCRIT 41.0 (L) 08/22/2018    PLATELETCT 476 (H) 08/22/2018        Total time of the discharge process exceeds 44 minutes.

## 2018-08-24 NOTE — PROGRESS NOTES
Discharging Patient home per physician order.  Discharged with self.  Demonstrated understanding of discharge instructions, follow up appointments, home medications, prescriptions, home care for surgical wound, and nursing care instructions for osteomyelitis, amputation of toe, diabetic foot ulcer.  Ambulating without assistance, voiding without difficulty, pain well controlled, tolerating oral medications, oxygen saturation greater than 90% , tolerating diet.   Educational handouts given and discussed.  Verbalized understanding of discharge instructions and educational handouts.  All questions answered.  Belongings with patient at time of discharge.  Patient provided with 2 bus passes by ANTONIA.

## 2018-08-24 NOTE — PROGRESS NOTES
[facility] Hospitalist Progress Note    Date of Service: 2018    Chief Complaint  62 y.o. male admitted 2018 with DM, HTN, HLD who presented with right foot osteomyelitis s/p 4th toe amputation . With suicidal ideation.    Interval Problem Update  No acute issues or complaints overnight.  Awaiting [facility] to be arranged.    Consultants/Specialty  ID  Psych  Ortho - [facility]    Disposition  Home with C when arranged      Review of Systems   Respiratory: Negative for shortness of breath.    Cardiovascular: Negative for chest pain.   Gastrointestinal: Negative for abdominal pain and constipation.   Musculoskeletal: Negative for joint pain.   Neurological: Negative for weakness.   Psychiatric/Behavioral: Negative for depression.      Physical Exam  Laboratory/Imaging   Hemodynamics  Temp (24hrs), Av.6 °C (97.9 °F), Min:36.1 °C (97 °F), Max:37 °C (98.6 °F)   Temperature: 36.4 °C (97.6 °F)  Pulse  Av.3  Min: 59  Max: 122    Blood Pressure: 121/71      Respiratory      Respiration: 18, Pulse Oximetry: 97 %        RUL Breath Sounds: Clear, RML Breath Sounds: Clear, RLL Breath Sounds: Diminished, STEPH Breath Sounds: Clear, LLL Breath Sounds: Diminished    Fluids    Intake/Output Summary (Last 24 hours) at 18 1226  Last data filed at 18 0800   Gross per 24 hour   Intake             1140 ml   Output                0 ml   Net             1140 ml       Nutrition  Orders Placed This Encounter   Procedures   • Diet Order Diabetic     Standing Status:   Standing     Number of Occurrences:   1     Order Specific Question:   Diet:     Answer:   Diabetic [3]     Physical Exam   Constitutional: He is oriented to person, place, and time. He appears well-developed and well-nourished. He is cooperative.   HENT:   Head: Normocephalic and atraumatic.   Eyes: Conjunctivae are normal. Right eye exhibits no discharge. Left eye exhibits no discharge.   Cardiovascular: Normal rate and regular rhythm.    Pulmonary/Chest:  Effort normal and breath sounds normal. He has no wheezes.   Abdominal: Soft. There is no tenderness.   Musculoskeletal:   Right foot in clean and intact dressings   Neurological: He is alert and oriented to person, place, and time.   Skin:   Right foot plantar ulcers and amputation site are dry   Psychiatric: He is withdrawn.   Nursing note and vitals reviewed.      Recent Labs      08/22/18   1145   WBC  10.9*   RBC  4.80   HEMOGLOBIN  13.6*   HEMATOCRIT  41.0*   MCV  85.4   MCH  28.3   MCHC  33.2*   RDW  43.7   PLATELETCT  476*   MPV  9.5     Recent Labs      08/22/18   0459  08/23/18   0444  08/24/18   0427   SODIUM  137  141  142   POTASSIUM  3.9  3.4*  3.5*   CHLORIDE  106  109  109   CO2  25  23  24   GLUCOSE  92  73  98   BUN  14  14  13   CREATININE  0.79  0.74  0.86   CALCIUM  9.0  9.1  9.1                      Assessment/Plan     * Osteomyelitis due to type 2 diabetes mellitus (HCC)- (present on admission)   Assessment & Plan    - ID and ortho consulted, recs appreciated  -  limb preservation evaluation completed  - MRI of right foot showing 4th and 5th toe osteomyelitis, septic arthritis, and cellulitis  - 8/12 right 4th toe amputation done  - Wound Cx + group b strep, strep viridans;  Cont unasyn until 9/10 per ID. He does not want long-term antibiotics. ID says can go on Augmentin if so        Uncontrolled diabetes mellitus (HCC)- (present on admission)   Assessment & Plan    Hyperglycemia  - cont lantus 30U qPM, ISS, hypoglycemic protocol, diabetic diet  - A1c is 12  - diabetes education  - cont home metformin  - improved control  - hypoglycemic protocol on board        Hypertension- (present on admission)   Assessment & Plan    continue Prinivil and amlodipine        Hypokalemia   Assessment & Plan    - replete and monitor  - Mg levels wnl        Suicidal ideation- (present on admission)   Assessment & Plan    - with Unspecified Mood Disorder and Schizophrenia  - pt placed on legal hold 8/11 when  "trying to leave AMA and his plan for worsening infection was to \"kill himself\" and saying he has plenty of guns at home  - psych following; awaiting updated recommendations  - legal hold to be d/c'ed?  - cont Abilify        Hyperlipidemia- (present on admission)   Assessment & Plan    continue home Lipitor.          Quality-Core Measures   Reviewed items::  EKG reviewed, Labs reviewed, Medications reviewed and Radiology images reviewed  Saavedra catheter::  No Saavedra  DVT prophylaxis pharmacological::  Enoxaparin (Lovenox)        "

## 2018-08-24 NOTE — DISCHARGE PLANNING
Anticipated Discharge Disposition: Home with C.    Action: Per [facility], [facility] the referral, KASSANDRA Leon notified.    Barriers to Discharge: None.    Plan: Home with C.

## 2018-08-24 NOTE — PROGRESS NOTES
This RN called and spoke with sandy in wound care regarding dressing change.  Change completed yesterday.  Per Sandy wound care the type of dressing is ok to remain in place until home health can change the dressing.

## 2018-08-24 NOTE — FACE TO FACE
Face to Face Supporting Documentation - Home Health    The encounter with this patient was in whole or in part the primary reason for home health admission.    Date of encounter:   Patient:                    MRN:                       YOB: 2019  Louis Orourke  [id number]  1955     Home health to see patient for:  Wound Care    Skilled need for:  Surgical Aftercare I&D of right foot    Skilled nursing interventions to include:  Wound Care    Homebound status evidenced by:  Need the aid of supportive devices such as crutches, canes, wheelchairs or walkers. Leaving home requires a considerable and taxing effort. There is a normal inability to leave the home.    Community Physician to provide follow up care: NAE Gonzalez     Optional Interventions? No      I certify the face to face encounter for this home health care referral meets the CMS requirements and the encounter/clinical assessment with the patient was, in whole, or in part, for the medical condition(s) listed above, which is the primary reason for home health care. Based on my clinical findings: the service(s) are medically necessary, support the need for home health care, and the homebound criteria are met.  I certify that this patient has had a face to face encounter by myself.  Anmol Tse M.D. - NPI: [id number]

## 2018-08-24 NOTE — PROGRESS NOTES
Infectious Disease Progress Note    Author: Karmen Saucedo M.D. Date & Time of service: 2018  2:49 PM    Chief Complaint:  Follow-up for right diabetic foot infection    Interval History:   AF WBC 9.7 patient denies any complaints however is a poor historian given paranoid schizophrenia, patient threatened to leave AMA   AF no CBC no new issues to report the patient is a poor historian, sitter at bedside as patient stated he wanted to kill himself yesterday, plan for IND with possible toe amputation today  2018-T-max 99.  No new issues overnight.  Underwent surgery this morning  2018-T-max 98.3.  No new issues overnight.  8/15/2018-T-max 98.  Wants to go home and apparently has been refusing PICC .  2018-T-max 99.5.  I had a long conversation with patient but it is unclear how much registers.  WBC 8 platelets 374 creatinine 0.74  2018 T-max 98.5.  Patient says he does not want to go to snf.  Still on one-to-one observation   AF answers no to all questions   AF creat 0.74 no clinical change   AF WBC 10.9 no acute events-Legal Hold in question   AF denies pain SE abx-states eating  Labs Reviewed, Medications Reviewed, Radiology Reviewed and Wound Reviewed.    Review of Systems:  Review of Systems   Unable to perform ROS: Psychiatric disorder       Hemodynamics:  Temp (24hrs), Av.6 °C (97.8 °F), Min:36.1 °C (97 °F), Max:37 °C (98.6 °F)  Temperature: 36.4 °C (97.6 °F)  Pulse  Av.1  Min: 59  Max: 122   Blood Pressure: 117/82       Physical Exam:  Physical Exam   Constitutional: He appears well-developed. No distress.   Disheveled   HENT:   Head: Normocephalic and atraumatic.   Eyes: Conjunctivae are normal. No scleral icterus.   Poor eye contact   Neck: Neck supple.   Cardiovascular: Normal rate.    Irregular   Pulmonary/Chest: Effort normal. No respiratory distress.   Abdominal: Soft. He exhibits no distension.   Musculoskeletal:   Right foot  dressed-declined removal   Neurological: He is alert.   Skin: Skin is warm. No rash noted.   Psychiatric:   Flat affect   Nursing note and vitals reviewed.      Meds:    Current Facility-Administered Medications:   •  ampicillin-sulbactam (UNASYN) IV  •  potassium chloride SA  •  ARIPiprazole  •  metFORMIN  •  enoxaparin (LOVENOX) injection  •  amLODIPine  •  lisinopril  •  atorvastatin  •  senna-docusate **AND** polyethylene glycol/lytes **AND** magnesium hydroxide **AND** bisacodyl  •  acetaminophen  •  ondansetron  •  ondansetron  •  promethazine  •  promethazine  •  prochlorperazine  •  insulin glargine  •  labetalol    Labs:  Recent Labs      08/22/18   1145   WBC  10.9*   RBC  4.80   HEMOGLOBIN  13.6*   HEMATOCRIT  41.0*   MCV  85.4   MCH  28.3   RDW  43.7   PLATELETCT  476*   MPV  9.5   NEUTSPOLYS  72.60*   LYMPHOCYTES  17.10*   MONOCYTES  5.70   EOSINOPHILS  3.40   BASOPHILS  0.70     Recent Labs      08/22/18   0459  08/23/18   0444  08/24/18   0427   SODIUM  137  141  142   POTASSIUM  3.9  3.4*  3.5*   CHLORIDE  106  109  109   CO2  25  23  24   GLUCOSE  92  73  98   BUN  14  14  13     Recent Labs      08/22/18   0459  08/23/18   0444  08/24/18   0427   CREATININE  0.79  0.74  0.86       Imaging:  Dx-foot-complete 3+ Right    Result Date: 8/9/2018 8/9/2018 6:43 PM HISTORY/REASON FOR EXAM:  Right foot pain and swelling. Multiple open wounds. TECHNIQUE/EXAM DESCRIPTION AND NUMBER OF VIEWS: 3 views of the RIGHT foot. COMPARISON:  None. FINDINGS: No acute fracture or dislocation is present. There is a moderate hallux valgus deformity present. There is soft tissue swelling surrounding the 4th and 5th toes. There is irregularity of the 5th MTP joint and the 5th IP joint without evidence of acute erosion. This may represent prior injury or inflammation. No lytic or blastic bony defect is identified to suggest acute osteomyelitis.     1.  No acute right foot fracture or dislocation. 2.  Soft tissue swelling  surrounding the 4th and 5th toes. 3.  No plain film evidence of acute osteomyelitis. 4.  Degenerative or post inflammatory irregularity of the right 5th MTP joint and IP joint. 5.  Moderate hallux valgus deformity.      Micro:  Results     ** No results found for the last 168 hours. **          Assessment:  Active Hospital Problems    Diagnosis   • *Osteomyelitis due to type 2 diabetes mellitus (HCC) [E11.69, M86.9]   • Uncontrolled diabetes mellitus (HCC) [E11.65]   • Hypertension [I10]   • Hyponatremia [E87.1]   • Hyperlipidemia [E78.5]       Plan:  Right diabetic foot infection  Afebrile  Leukocytosis resolved  s/p right fourth toe amputation and right foot I&D on 8/12/2018  Due to foot abscess anticipate 4 weeks IV abx   Wound culture+ group B streptococcus strep viridans  Continue Unasyn through 9/10  Wound care  Recommend placement    DM 2, poorly controlled  Hemoglobin A1c 12   Maintain blood sugars less than 150 to control infection and promote wound healing    Paranoid schizophrenia  Impediment to his care and treatment  Suicidal ideation on admit

## 2018-08-24 NOTE — PROGRESS NOTES
Ao x 4, resting in bed.  No c/o pain.  Dressing to RLE c/d/i.  Foot warm, pink.  Denied thoughts of harm to self or others.  Awaiting case coordination for discharge planning.  Plan of care discussed, questions answered, verbalized understanding.

## 2018-08-24 NOTE — PROGRESS NOTES
"Diabetes education: Met with patient this afternoon, since legal hold was discontinued and it was reported he was more clear.  Pt was anxious about going home but willing to discuss how he manages his diabetes at home. Pt states he takes Basaglar and some pills ( metformin?) at home . Reviewed pen use, storage, shelf life and site rotation ( he uses his abdomen). Pt states he uses a Metrix ( true metrix) meter and test every morning.   Pt was more focused on going home, then talking about diabetes. Reviewed foot care and asked if would be following with Wound clinic. Asked about his boot ( it was across the room not on his foot).   Nursing attempted to get psych to return call so patient can be discharged. Pt had no \" diabetes\" questions, only when can I go home.  "

## 2018-08-24 NOTE — DISCHARGE PLANNING
Received Choice form at 2031  Agency/Facility Name: [facility]  Referral sent per Choice form @ [id number]

## 2018-08-28 ENCOUNTER — TELEPHONE (OUTPATIENT)
Dept: HEALTH INFORMATION MANAGEMENT | Facility: OTHER | Age: 63
End: 2018-08-28

## 2018-08-28 ENCOUNTER — HOME CARE VISIT (OUTPATIENT)
Dept: HOME HEALTH SERVICES | Facility: HOME HEALTHCARE | Age: 63
End: 2018-08-28
Payer: MEDICAID

## 2018-08-28 VITALS
HEART RATE: 83 BPM | WEIGHT: 202 LBS | HEIGHT: 71 IN | BODY MASS INDEX: 28.28 KG/M2 | TEMPERATURE: 98.5 F | DIASTOLIC BLOOD PRESSURE: 62 MMHG | RESPIRATION RATE: 16 BRPM | SYSTOLIC BLOOD PRESSURE: 128 MMHG | OXYGEN SATURATION: 98 %

## 2018-08-28 PROCEDURE — A6209 FOAM DRSG <=16 SQ IN W/O BDR: HCPCS

## 2018-08-28 PROCEDURE — A6449 LT COMPRES BAND >=3" <5"/YD: HCPCS

## 2018-08-28 PROCEDURE — 665001 SOC-HOME HEALTH

## 2018-08-28 PROCEDURE — A6403 STERILE GAUZE>16 <= 48 SQ IN: HCPCS

## 2018-08-28 PROCEDURE — G0493 RN CARE EA 15 MIN HH/HOSPICE: HCPCS

## 2018-08-28 PROCEDURE — A6197 ALGINATE DRSG >16 <=48 SQ IN: HCPCS

## 2018-08-28 PROCEDURE — A6207 CONTACT LAYER >16<= 48 SQ IN: HCPCS

## 2018-08-28 SDOH — ECONOMIC STABILITY: HOUSING INSECURITY: UNSAFE COOKING RANGE AREA: 0

## 2018-08-28 SDOH — ECONOMIC STABILITY: HOUSING INSECURITY: UNSAFE APPLIANCES: 0

## 2018-08-28 ASSESSMENT — PATIENT HEALTH QUESTIONNAIRE - PHQ9
1. LITTLE INTEREST OR PLEASURE IN DOING THINGS: 00
2. FEELING DOWN, DEPRESSED, IRRITABLE, OR HOPELESS: 00

## 2018-08-28 ASSESSMENT — ENCOUNTER SYMPTOMS
VOMITING: DENIES
NAUSEA: DENIES
DEPRESSED MOOD: 1

## 2018-08-28 ASSESSMENT — ACTIVITIES OF DAILY LIVING (ADL)
HOME_HEALTH_OASIS: 03
HOME_HEALTH_OASIS: 01

## 2018-08-28 NOTE — TELEPHONE ENCOUNTER
Received referral from [facility]. Medications reviewed against discharge summary. No clinically significant interactions or medication issues noted.      Bharati Smith, SimonD

## 2018-08-29 ENCOUNTER — OFFICE VISIT (OUTPATIENT)
Dept: MEDICAL GROUP | Facility: MEDICAL CENTER | Age: 63
End: 2018-08-29
Attending: NURSE PRACTITIONER
Payer: MEDICAID

## 2018-08-29 VITALS
WEIGHT: 196 LBS | RESPIRATION RATE: 16 BRPM | HEIGHT: 71 IN | TEMPERATURE: 97.6 F | SYSTOLIC BLOOD PRESSURE: 138 MMHG | BODY MASS INDEX: 27.44 KG/M2 | DIASTOLIC BLOOD PRESSURE: 70 MMHG | OXYGEN SATURATION: 98 % | HEART RATE: 48 BPM

## 2018-08-29 DIAGNOSIS — M86.9 OSTEOMYELITIS DUE TO TYPE 2 DIABETES MELLITUS (HCC): ICD-10-CM

## 2018-08-29 DIAGNOSIS — I10 ESSENTIAL HYPERTENSION: ICD-10-CM

## 2018-08-29 DIAGNOSIS — Z78.9 POLST (PHYSICIAN ORDERS FOR LIFE-SUSTAINING TREATMENT): ICD-10-CM

## 2018-08-29 DIAGNOSIS — E11.69 OSTEOMYELITIS DUE TO TYPE 2 DIABETES MELLITUS (HCC): ICD-10-CM

## 2018-08-29 DIAGNOSIS — E11.9 TYPE 2 DIABETES MELLITUS WITHOUT COMPLICATION, WITH LONG-TERM CURRENT USE OF INSULIN (HCC): ICD-10-CM

## 2018-08-29 DIAGNOSIS — F99 PSYCHIATRIC DISORDER: ICD-10-CM

## 2018-08-29 DIAGNOSIS — Z79.4 TYPE 2 DIABETES MELLITUS WITHOUT COMPLICATION, WITH LONG-TERM CURRENT USE OF INSULIN (HCC): ICD-10-CM

## 2018-08-29 PROCEDURE — 99214 OFFICE O/P EST MOD 30 MIN: CPT | Performed by: NURSE PRACTITIONER

## 2018-08-29 ASSESSMENT — PAIN SCALES - GENERAL: PAINLEVEL: NO PAIN

## 2018-08-29 NOTE — PROGRESS NOTES
Chief Complaint:   Chief Complaint   Patient presents with   • Diabetes   • Foot Problem     right foot        HPI:  Louis is here today for a follow-up on diabetic ischemic foot ulcer/ostomyelitis  His PMH includes:  DM-2, poor control.  Hyperlipidemia  HTN  Elevated PSA but Pt not amenable to further eval or referral to urology  Cerumen Impactions  Ringing in Ears  Varicose Vein of RLE  Vitamin D Deficiency  Cerumen Impaction left ear canal     Review of Records:  8/9--> 8/24/18 Admit for right 4th toe ulcer/ischemia---> Osteomyelitis, Suicidal thoughts, Started on Abilify by Psychiatry and improved.  D/C on ongoing antibiotics and w Home Health for wound care. Instructed to be better w compliance on DM.  8/9/18 Clinic Visit w Dr Herrera, ischemic toe---> Sent to hospital.  5/29/18 Clinic visit for f/u o DM-2, meter use. EAR irrigation. Referred to ENT and FIT test ordered.  5/14/18 Clinic for Results, f/u DM-2, HTN, ear Wax plugging left ear canal. Discussed need to come in for Glucometer training and for Left Ear Irrigation.  5/3/18 Labs CMP normal except Fasting BS= 122, A1C= 11.1 ( ~ 272). Cholesterol Total= 174, Triglycerides= 177, HDL= 42, LDL= 97,  Vit B12= 763, TSH= 1.220, Vit D= 26 (low)  4/2/18 [facility] for f/u on DM-2, Htn, Med refills. Labs ordered, Refer for Retina exam.RX Claritin for allergies  4/18/17 Clinic for F/u in DM, Glucometer problems. (4/5/17 A1c= 12.2) Pt reports continuing Metformin 850 TID, Lantus 30 u at hs  Discussed Elevated PSA but Pt not willing for Urology Referral. INcrease in dose of Lipitor for ongoing Hyperlipidemia. RX New Meter  True Metrix w supplies. RX Vit D.         Type 2 diabetes mellitus without complication (CMS-HCC)  Reports his DM he continues 30 u long acting at night.  Continue to take Metformin 850 TID.   Yesterday BS= 213 in am.  Denies lows.  Home health comes to home wound care of right 4th toe osteomyleitis S/P surgical amputation    Osteomyelitis due to  type 2 diabetes mellitus (HCC)  REports home health visits 3x/wk for dressing change for right foot osteomyelitis.  REports no pain at all.  Denies fever or chills.   Continues on Augmentin antibiotic.      Hypertension  /70 and HR 48-50. Denies light headed feeling in clinic today. Known hx of HTN.  Pt stating that they have been taking their medication as prescribed without adverse effect. Pt denies HA, vision changes, neurologic deficits, CP, SOB.     We discussed if he gets light headed or feels weak to go to ER.    Psychiatric disorder  Pt reports is not suicidal, Denies depression and states is taking Abilfy.  States not interested in Psychiatry.      POLST (Physician Orders for Life-Sustaining Treatment)  Pt has pink form with him for POLST.  He is A and O x 4. I interviewed patient and   Asked him questions about his wishes  And completed form.  Ирина BRDALEY to scan into records.        Patient Active Problem List    Diagnosis Date Noted   • Osteomyelitis due to type 2 diabetes mellitus (HCC) 08/09/2018     Priority: High   • Uncontrolled diabetes mellitus (HCC) 08/09/2018     Priority: Medium   • Hypertension 11/06/2014     Priority: Medium   • Hyperlipidemia 04/09/2015     Priority: Low   • Psychiatric disorder 08/29/2018   • POLST (Physician Orders for Life-Sustaining Treatment) 08/29/2018   • Hypokalemia 08/23/2018   • Suicidal ideation 08/13/2018   • Vitamin D deficiency 04/18/2017   • Vertigo 05/23/2016   • Cold 09/17/2015   • Ringing in the ears 06/09/2015   • Elevated PSA 04/09/2015   • Type 2 diabetes mellitus without complication (CMS-[facility]) 11/06/2014   • Varicose veins of right lower extremity with pain 11/06/2014   • Excessive cerumen in left ear canal 11/06/2014       Allergies:Patient has no known allergies.    Medicines as of today:  Current Outpatient Prescriptions   Medication Sig Dispense Refill   • ARIPiprazole (ABILIFY) 10 MG Tab Take 1 Tab by mouth every day. 30 Tab 0   • amLODIPine  "(NORVASC) 5 MG Tab Take 1 Tab by mouth every day. 30 Tab 0   • amoxicillin-clavulanate (AUGMENTIN) 875-125 MG Tab Take 1 Tab by mouth 2 times a day for 17 days. 34 Tab 0   • Cholecalciferol 4000 units Cap Take 1 Capsule by mouth every day. 30 Cap 5   • insulin glargine (LANTUS) 100 UNIT/ML Solution Pen-injector injection Inject 30 Units as instructed every evening. 15 mL 5   • lisinopril (PRINIVIL, ZESTRIL) 40 MG tablet Take 1 Tab by mouth every day. 30 Tab 5   • metFORMIN (GLUCOPHAGE) 850 MG Tab Take 1 Tab by mouth 3 times a day, with meals. 90 Tab 5   • atorvastatin (LIPITOR) 20 MG Tab Take 1 Tab by mouth every day. 30 Tab 5   • loratadine (CLARITIN) 10 MG Tab Take 1 Tab by mouth every day. 30 Tab 2     No current facility-administered medications for this visit.        Social History   Substance Use Topics   • Smoking status: Never Smoker   • Smokeless tobacco: Never Used   • Alcohol use Yes      Comment: once in a while. A couple beers per week.       Past Medical History:   Diagnosis Date   • Hyperlipidemia    • Hypertension    • Type II or unspecified type diabetes mellitus without mention of complication, not stated as uncontrolled        Family History   Problem Relation Age of Onset   • Stroke Mother    • Heart Disease Mother    • Diabetes Mother    • Lung Disease Father        ROS:  Review of Systems   See HPI Above    Exam:  Blood pressure 138/70, pulse (!) 48, temperature 36.4 °C (97.6 °F), resp. rate 16, height 1.803 m (5' 10.98\"), weight 88.9 kg (196 lb), SpO2 98 %. Body mass index is 27.35 kg/m².    General:  Well nourished, well developed male in NAD  HENT:Head is grossly normal. PERRL.  Neck: Supple. Trachea is midline.  Pulmonary: Clear to ausculation .  Normal effort. No rales, ronchi, or wheezing.   Cardiovascular: Regular rate and rhythm.  Abdomen-Abdomen is soft, No tenderness.  Upper extremities- Strong = . Good ROM  Lower extremities- neg for edema, redness, tenderness. Right foot in " special boot.  Neuro- A & O x 4. Speech clear and appropriate.    Current medications, allergies, and problem list reviewed with patient and updated in EPIC today.    Assessment/Plan:  1. Type 2 diabetes mellitus without complication, with long-term current use of insulin (HCC)  Continue Long acting insulin 30 u at hs  Continue Metformin 850 mg TID.  Decrease sugar/carbs in diet.  PT encouraged to check fasting BS every am.   2. Osteomyelitis due to type 2 diabetes mellitus (HCC)  Continue Augment for the full 17 days.  Continue wound care w Home Health.   3. Essential hypertension  Continue current Amlodipine and Lisinopril.    4. Psychiatric disorder  Continue Abilify.    5. POLST (Physician Orders for Life-Sustaining Treatment)  Patient interviewed and form completed and signed. See media.       Return in about 6 weeks (around 10/10/2018) for f/u DM and Osteomyelitis.

## 2018-08-29 NOTE — ASSESSMENT & PLAN NOTE
REports home health visits 3x/wk for dressing change for right foot osteomyelitis.  REports no pain at all.  Denies fever or chills.   Continues on Augmentin antibiotic.

## 2018-08-29 NOTE — ASSESSMENT & PLAN NOTE
Reports his DM he continues 30 u long acting at night.  Continue to take Metformin 850 TID.   Yesterday BS= 213 in am.  Denies lows.  Home health comes to home wound care of right 4th toe osteomyleitis S/P surgical amputation

## 2018-08-29 NOTE — ASSESSMENT & PLAN NOTE
/70 and HR 48-50. Denies light headed feeling in clinic today. Known hx of HTN.  Pt stating that they have been taking their medication as prescribed without adverse effect. Pt denies HA, vision changes, neurologic deficits, CP, SOB.

## 2018-08-29 NOTE — ASSESSMENT & PLAN NOTE
Pt has pink form with him for POLST.  He is A and O x 4. I interviewed patient and   Asked him questions about his wishes  And completed form.  Ирина BRADLEY to scan into records.

## 2018-08-30 ENCOUNTER — HOME CARE VISIT (OUTPATIENT)
Dept: HOME HEALTH SERVICES | Facility: HOME HEALTHCARE | Age: 63
End: 2018-08-30
Payer: MEDICAID

## 2018-08-30 VITALS
SYSTOLIC BLOOD PRESSURE: 134 MMHG | DIASTOLIC BLOOD PRESSURE: 72 MMHG | RESPIRATION RATE: 18 BRPM | HEART RATE: 74 BPM | TEMPERATURE: 97.9 F | OXYGEN SATURATION: 96 %

## 2018-08-30 PROCEDURE — A6403 STERILE GAUZE>16 <= 48 SQ IN: HCPCS

## 2018-08-30 PROCEDURE — A5120 SKIN BARRIER, WIPE OR SWAB: HCPCS

## 2018-08-30 PROCEDURE — G0299 HHS/HOSPICE OF RN EA 15 MIN: HCPCS

## 2018-08-30 PROCEDURE — A6207 CONTACT LAYER >16<= 48 SQ IN: HCPCS

## 2018-09-01 ENCOUNTER — HOME CARE VISIT (OUTPATIENT)
Dept: HOME HEALTH SERVICES | Facility: HOME HEALTHCARE | Age: 63
End: 2018-09-01
Payer: MEDICAID

## 2018-09-01 PROCEDURE — G0299 HHS/HOSPICE OF RN EA 15 MIN: HCPCS

## 2018-09-02 VITALS
TEMPERATURE: 98.4 F | RESPIRATION RATE: 14 BRPM | SYSTOLIC BLOOD PRESSURE: 130 MMHG | OXYGEN SATURATION: 97 % | HEART RATE: 78 BPM | DIASTOLIC BLOOD PRESSURE: 80 MMHG

## 2018-09-03 ENCOUNTER — HOME CARE VISIT (OUTPATIENT)
Dept: HOME HEALTH SERVICES | Facility: HOME HEALTHCARE | Age: 63
End: 2018-09-03
Payer: MEDICAID

## 2018-09-03 VITALS
DIASTOLIC BLOOD PRESSURE: 58 MMHG | TEMPERATURE: 96.5 F | OXYGEN SATURATION: 96 % | RESPIRATION RATE: 16 BRPM | SYSTOLIC BLOOD PRESSURE: 108 MMHG | HEART RATE: 55 BPM

## 2018-09-03 PROCEDURE — G0299 HHS/HOSPICE OF RN EA 15 MIN: HCPCS

## 2018-09-03 SDOH — ECONOMIC STABILITY: HOUSING INSECURITY: UNSAFE COOKING RANGE AREA: 0

## 2018-09-03 SDOH — ECONOMIC STABILITY: HOUSING INSECURITY: UNSAFE APPLIANCES: 0

## 2018-09-03 ASSESSMENT — ACTIVITIES OF DAILY LIVING (ADL)
HOUSEKEEPING_ASSISTANCE: 0
ORAL_CARE_ASSISTANCE: 0
TOILETING_ASSISTANCE: 0
TRANSPORTATION_ASSISTANCE: 6
EATING_ASSISTANCE: 0
MEAL_PREP_ASSISTANCE: 0
DRESSING_UB_ASSISTANCE: 0
SHOPPING_ASSISTANCE: 6
BATHING_ASSISTANCE: 0
TELEPHONE_ASSISTANCE: 0
DRESSING_LB_ASSISTANCE: 0
LAUNDRY_ASSISTANCE: 0
GROOMING_ASSISTANCE: 0

## 2018-09-03 ASSESSMENT — ENCOUNTER SYMPTOMS
VOMITING: DENIES
NAUSEA: DENIES

## 2018-09-04 ASSESSMENT — ACTIVITIES OF DAILY LIVING (ADL)
DRESSING_UB_ASSISTANCE: 0
EATING_ASSISTANCE: 0
HOUSEKEEPING_ASSISTANCE: 0
TELEPHONE_ASSISTANCE: 0
BATHING_ASSISTANCE: 0
DRESSING_LB_ASSISTANCE: 0
TOILETING_ASSISTANCE: 0
TRANSPORTATION_ASSISTANCE: 6
ORAL_CARE_ASSISTANCE: 0
MEAL_PREP_ASSISTANCE: 0
GROOMING_ASSISTANCE: 0
LAUNDRY_ASSISTANCE: 0
SHOPPING_ASSISTANCE: 0

## 2018-09-04 ASSESSMENT — ENCOUNTER SYMPTOMS
VOMITING: PT DENIES
NAUSEA: PT DENIES
NAUSEA: DENIES
VOMITING: DENIES

## 2018-09-05 ENCOUNTER — HOME CARE VISIT (OUTPATIENT)
Dept: HOME HEALTH SERVICES | Facility: HOME HEALTHCARE | Age: 63
End: 2018-09-05
Payer: MEDICAID

## 2018-09-05 VITALS
OXYGEN SATURATION: 98 % | TEMPERATURE: 98.5 F | SYSTOLIC BLOOD PRESSURE: 108 MMHG | HEART RATE: 86 BPM | DIASTOLIC BLOOD PRESSURE: 70 MMHG | RESPIRATION RATE: 16 BRPM

## 2018-09-05 PROCEDURE — G0299 HHS/HOSPICE OF RN EA 15 MIN: HCPCS

## 2018-09-06 SDOH — ECONOMIC STABILITY: HOUSING INSECURITY: UNSAFE COOKING RANGE AREA: 0

## 2018-09-06 SDOH — ECONOMIC STABILITY: HOUSING INSECURITY: UNSAFE APPLIANCES: 0

## 2018-09-06 ASSESSMENT — ACTIVITIES OF DAILY LIVING (ADL)
TRANSPORTATION_ASSISTANCE: 6
HOUSEKEEPING_ASSISTANCE: 0
DRESSING_LB_ASSISTANCE: 0
BATHING_ASSISTANCE: 0
TOILETING_ASSISTANCE: 0
SHOPPING_ASSISTANCE: 6
MEAL_PREP_ASSISTANCE: 0
ORAL_CARE_ASSISTANCE: 0
DRESSING_UB_ASSISTANCE: 0
GROOMING_ASSISTANCE: 0
TELEPHONE_ASSISTANCE: 0
EATING_ASSISTANCE: 0
LAUNDRY_ASSISTANCE: 0

## 2018-09-06 ASSESSMENT — ENCOUNTER SYMPTOMS
VOMITING: DENIES
NAUSEA: DENIES

## 2018-09-07 ENCOUNTER — HOME CARE VISIT (OUTPATIENT)
Dept: HOME HEALTH SERVICES | Facility: HOME HEALTHCARE | Age: 63
End: 2018-09-07
Payer: MEDICAID

## 2018-09-07 PROCEDURE — G0299 HHS/HOSPICE OF RN EA 15 MIN: HCPCS

## 2018-09-07 PROCEDURE — A4450 NON-WATERPROOF TAPE: HCPCS

## 2018-09-07 ASSESSMENT — ACTIVITIES OF DAILY LIVING (ADL): OASIS_M1830: 02

## 2018-09-08 VITALS
HEART RATE: 88 BPM | SYSTOLIC BLOOD PRESSURE: 126 MMHG | DIASTOLIC BLOOD PRESSURE: 76 MMHG | OXYGEN SATURATION: 97 % | RESPIRATION RATE: 18 BRPM | TEMPERATURE: 98.2 F

## 2018-09-08 SDOH — ECONOMIC STABILITY: HOUSING INSECURITY: UNSAFE COOKING RANGE AREA: 0

## 2018-09-08 SDOH — ECONOMIC STABILITY: HOUSING INSECURITY: UNSAFE APPLIANCES: 0

## 2018-09-10 ENCOUNTER — HOME CARE VISIT (OUTPATIENT)
Dept: HOME HEALTH SERVICES | Facility: HOME HEALTHCARE | Age: 63
End: 2018-09-10
Payer: MEDICAID

## 2018-09-10 PROCEDURE — G0493 RN CARE EA 15 MIN HH/HOSPICE: HCPCS

## 2018-09-10 ASSESSMENT — ACTIVITIES OF DAILY LIVING (ADL)
TRANSPORTATION_ASSISTANCE: 6
SHOPPING_ASSISTANCE: 6
DRESSING_UB_ASSISTANCE: 0
GROOMING_ASSISTANCE: 0
BATHING_ASSISTANCE: 0
TOILETING_ASSISTANCE: 0
HOUSEKEEPING_ASSISTANCE: 6
MEAL_PREP_ASSISTANCE: 0
DRESSING_LB_ASSISTANCE: 0
EATING_ASSISTANCE: 0
LAUNDRY_ASSISTANCE: 4
TELEPHONE_ASSISTANCE: 0
ORAL_CARE_ASSISTANCE: 0

## 2018-09-10 ASSESSMENT — ENCOUNTER SYMPTOMS
NAUSEA: DENIES
VOMITING: DENIES

## 2018-09-11 VITALS
TEMPERATURE: 98.4 F | DIASTOLIC BLOOD PRESSURE: 78 MMHG | OXYGEN SATURATION: 97 % | SYSTOLIC BLOOD PRESSURE: 124 MMHG | RESPIRATION RATE: 18 BRPM | HEART RATE: 83 BPM

## 2018-09-11 SDOH — ECONOMIC STABILITY: HOUSING INSECURITY: UNSAFE APPLIANCES: 0

## 2018-09-11 SDOH — ECONOMIC STABILITY: HOUSING INSECURITY: UNSAFE COOKING RANGE AREA: 0

## 2018-09-11 ASSESSMENT — ACTIVITIES OF DAILY LIVING (ADL)
HOME_HEALTH_OASIS: 00
OASIS_M1830: 00

## 2018-09-13 PROCEDURE — G0180 MD CERTIFICATION HHA PATIENT: HCPCS | Performed by: FAMILY MEDICINE

## 2018-10-10 ENCOUNTER — OFFICE VISIT (OUTPATIENT)
Dept: MEDICAL GROUP | Facility: MEDICAL CENTER | Age: 63
End: 2018-10-10
Attending: NURSE PRACTITIONER
Payer: MEDICAID

## 2018-10-10 VITALS
TEMPERATURE: 97.7 F | BODY MASS INDEX: 27.86 KG/M2 | SYSTOLIC BLOOD PRESSURE: 130 MMHG | HEIGHT: 71 IN | HEART RATE: 60 BPM | OXYGEN SATURATION: 95 % | WEIGHT: 199 LBS | DIASTOLIC BLOOD PRESSURE: 80 MMHG | RESPIRATION RATE: 20 BRPM

## 2018-10-10 DIAGNOSIS — E55.9 VITAMIN D DEFICIENCY: ICD-10-CM

## 2018-10-10 DIAGNOSIS — I10 ESSENTIAL HYPERTENSION: ICD-10-CM

## 2018-10-10 DIAGNOSIS — Z23 NEED FOR 23-POLYVALENT PNEUMOCOCCAL POLYSACCHARIDE VACCINE: ICD-10-CM

## 2018-10-10 DIAGNOSIS — L85.3 DRY SKIN DERMATITIS: ICD-10-CM

## 2018-10-10 DIAGNOSIS — E11.69 OSTEOMYELITIS DUE TO TYPE 2 DIABETES MELLITUS (HCC): ICD-10-CM

## 2018-10-10 DIAGNOSIS — R09.81 NASAL CONGESTION: ICD-10-CM

## 2018-10-10 DIAGNOSIS — Z23 NEED FOR INFLUENZA VACCINATION: ICD-10-CM

## 2018-10-10 DIAGNOSIS — M86.9 OSTEOMYELITIS DUE TO TYPE 2 DIABETES MELLITUS (HCC): ICD-10-CM

## 2018-10-10 DIAGNOSIS — Z13.21 ENCOUNTER FOR VITAMIN DEFICIENCY SCREENING: ICD-10-CM

## 2018-10-10 DIAGNOSIS — E78.49 OTHER HYPERLIPIDEMIA: ICD-10-CM

## 2018-10-10 DIAGNOSIS — Z79.4 TYPE 2 DIABETES MELLITUS WITHOUT COMPLICATION, WITH LONG-TERM CURRENT USE OF INSULIN (HCC): ICD-10-CM

## 2018-10-10 DIAGNOSIS — F99 PSYCHIATRIC DISORDER: ICD-10-CM

## 2018-10-10 DIAGNOSIS — L84 CORN OF FOOT: ICD-10-CM

## 2018-10-10 DIAGNOSIS — E11.9 TYPE 2 DIABETES MELLITUS WITHOUT COMPLICATION, WITH LONG-TERM CURRENT USE OF INSULIN (HCC): ICD-10-CM

## 2018-10-10 DIAGNOSIS — Z13.0 SCREENING FOR IRON DEFICIENCY ANEMIA: ICD-10-CM

## 2018-10-10 DIAGNOSIS — Z13.29 SCREENING FOR THYROID DISORDER: ICD-10-CM

## 2018-10-10 PROBLEM — R45.851 SUICIDAL IDEATION: Status: RESOLVED | Noted: 2018-08-13 | Resolved: 2018-10-10

## 2018-10-10 PROBLEM — E87.6 HYPOKALEMIA: Status: RESOLVED | Noted: 2018-08-23 | Resolved: 2018-10-10

## 2018-10-10 PROBLEM — L98.491 CALLOUS ULCER, LIMITED TO BREAKDOWN OF SKIN (HCC): Status: ACTIVE | Noted: 2018-10-10

## 2018-10-10 PROCEDURE — 90732 PPSV23 VACC 2 YRS+ SUBQ/IM: CPT

## 2018-10-10 PROCEDURE — 99214 OFFICE O/P EST MOD 30 MIN: CPT | Performed by: NURSE PRACTITIONER

## 2018-10-10 PROCEDURE — 90686 IIV4 VACC NO PRSV 0.5 ML IM: CPT

## 2018-10-10 RX ORDER — AMLODIPINE BESYLATE 5 MG/1
5 TABLET ORAL DAILY
Qty: 30 TAB | Refills: 0 | Status: SHIPPED | OUTPATIENT
Start: 2018-10-10 | End: 2019-01-23 | Stop reason: SDUPTHER

## 2018-10-10 RX ORDER — ATORVASTATIN CALCIUM 20 MG/1
20 TABLET, FILM COATED ORAL DAILY
Qty: 30 TAB | Refills: 5 | Status: SHIPPED | OUTPATIENT
Start: 2018-10-10 | End: 2019-07-08 | Stop reason: SDUPTHER

## 2018-10-10 RX ORDER — LORATADINE 10 MG/1
10 TABLET ORAL DAILY
Qty: 30 TAB | Refills: 2 | Status: SHIPPED | OUTPATIENT
Start: 2018-10-10 | End: 2019-04-23 | Stop reason: SDUPTHER

## 2018-10-10 RX ORDER — LISINOPRIL 40 MG/1
40 TABLET ORAL DAILY
Qty: 30 TAB | Refills: 5 | Status: SHIPPED | OUTPATIENT
Start: 2018-10-10 | End: 2019-07-08 | Stop reason: SDUPTHER

## 2018-10-10 RX ORDER — AMMONIUM LACTATE 12 G/100G
LOTION TOPICAL
Qty: 1 BOTTLE | Refills: 2 | Status: SHIPPED | OUTPATIENT
Start: 2018-10-10

## 2018-10-10 NOTE — PROGRESS NOTES
Chief Complaint:   Chief Complaint   Patient presents with   • Follow-Up   • Diabetes      at 830 am        HPI:  Louis is here today for a follow-up on DM-2 and Right foot ulceration.    Transportation typically is his Motorcycle    His PMH includes:  Depression( suicidal 8/2018)  DM-2, poor control.  Hyperlipidemia  HTN  Elevated PSA but Pt not amenable to further eval or referral to urology  Cerumen Impactions  Ringing in Ears  Varicose Vein of RLE  Vitamin D Deficiency  Cerumen Impaction left ear canal  Diabetic foot ulcer/Ostomyelitis( 8/2018)    [place]:  No Entries for controlled substances     Review of Records:  8/29/18 Clinic visit for hospital f/u r/t right 4th toe ulcer/osteo, emotional status. To continue Augmentin and Abilify and REcommend Psychiatry, but patient not interested.  To keep good control of DM-2( Metformin 850 TID and Lantus 30 u at hs).  8/9--> 8/24/18 Admit for right 4th toe ulcer/ischemia---> Osteomyelitis, Suicidal thoughts, Started on Abilify by Psychiatry and improved.  D/C on ongoing antibiotics and w Home Health for wound care. Instructed to be better w compliance on DM.  8/9/18 Clinic Visit w Dr Herrera, ischemic toe---> Sent to hospital.  5/29/18 Clinic visit for f/u o DM-2, meter use. EAR irrigation. Referred to ENT and FIT test ordered.  5/14/18 Clinic for Results, f/u DM-2, HTN, ear Wax plugging left ear canal. Discussed need to come in for Glucometer training and for Left Ear Irrigation.  5/3/18 Labs CMP normal except Fasting BS= 122, A1C= 11.1 ( ~ 272). Cholesterol Total= 174, Triglycerides= 177, HDL= 42, LDL= 97,  Vit B12= 763, TSH= 1.220, Vit D= 26 (low)  4/2/18 [facility] for f/u on DM-2, Htn, Med refills. Labs ordered, Refer for Retina exam.RX Claritin for allergies  4/18/17 Clinic for F/u in DM, Glucometer problems. (4/5/17 A1c= 12.2) Pt reports continuing Metformin 850 TID, Lantus 30 u at hs  Discussed Elevated PSA but Pt not willing for Urology Referral.  "INcrease in dose of Lipitor for ongoing Hyperlipidemia. RX New Meter  True Metrix w supplies. RX Vit D.    Type 2 diabetes mellitus without complication (CMS-[facility])  Reports BS was 310 at 8;30 after drinking coffee and milk.  Denies any lows.  Due for REtina exam.   Right 4th toe missing due to surgical amputation.    Psychiatric disorder  Reports is feeling \"good\". Denies suicidal ideation  Is taking Abilify.    Osteomyelitis due to type 2 diabetes mellitus ([facility])  REports right foot well healed, Did have amputation of 4th toe due to infection and osteomyelitis in August 2018.  Reports small corn on right foot \"for years\"      Uncontrolled diabetes mellitus ([facility])  REports BS at 8:30 this am=  310.  Is taking Metformin 850 TID and Lantus hs 30 units.  Last A1C= 12.0 8/10/18    Recommend f/u labs in 7 weeks prior to f/u appt w me.    [place] of foot  Reports small corn on right foot lateral aspect for years.  Denies pain or drainage.  Has dry skin and rough  Recommend lac Hydrin lotion.    Hypertension  /80  in clinic today. Known hx of HTN.Pt stating that they have been taking their medication as prescribed without adverse effect. Pt denies HA, vision changes, neurologic deficits, CP, SOB.       Patient Active Problem List    Diagnosis Date Noted   • Osteomyelitis due to type 2 diabetes mellitus (HCC) 08/09/2018     Priority: High   • Uncontrolled diabetes mellitus (HCC) 08/09/2018     Priority: Medium   • Hypertension 11/06/2014     Priority: Medium   • Hyperlipidemia 04/09/2015     Priority: Low   • Corn of foot 10/10/2018   • Psychiatric disorder 08/29/2018   • POLST (Physician Orders for Life-Sustaining Treatment) 08/29/2018   • Vitamin D deficiency 04/18/2017   • Vertigo 05/23/2016   • Elevated PSA 04/09/2015   • Type 2 diabetes mellitus without complication (CMS-[facility]) 11/06/2014   • Varicose veins of right lower extremity with pain 11/06/2014       Allergies:Patient has no known allergies.    Medicines as of " "today:  Current Outpatient Prescriptions   Medication Sig Dispense Refill   • amLODIPine (NORVASC) 5 MG Tab Take 1 Tab by mouth every day. 30 Tab 0   • Cholecalciferol 4000 units Cap Take 1 Capsule by mouth every day. 30 Cap 5   • insulin glargine (LANTUS) 100 UNIT/ML Solution Pen-injector injection Inject 30 Units as instructed every evening. 15 mL 5   • lisinopril (PRINIVIL, ZESTRIL) 40 MG tablet Take 1 Tab by mouth every day. 30 Tab 5   • metFORMIN (GLUCOPHAGE) 850 MG Tab Take 1 Tab by mouth 3 times a day, with meals. 90 Tab 5   • atorvastatin (LIPITOR) 20 MG Tab Take 1 Tab by mouth every day. 30 Tab 5   • loratadine (CLARITIN) 10 MG Tab Take 1 Tab by mouth every day. 30 Tab 2   • ammonium lactate (LAC-HYDRIN) 12 % Lotion Use 1/4 ribbon to each foot daily to decrease dryness and corns/cracks 1 Bottle 2   • ARIPiprazole (ABILIFY) 10 MG Tab Take 1 Tab by mouth every day. 30 Tab 0     No current facility-administered medications for this visit.        Social History   Substance Use Topics   • Smoking status: Never Smoker   • Smokeless tobacco: Never Used   • Alcohol use Yes      Comment: once in a while. A couple beers per week.       Past Medical History:   Diagnosis Date   • Hyperlipidemia    • Hypertension    • Type II or unspecified type diabetes mellitus without mention of complication, not stated as uncontrolled        Family History   Problem Relation Age of Onset   • Stroke Mother    • Heart Disease Mother    • Diabetes Mother    • Lung Disease Father        ROS:  Review of Systems   See HPI Above    Exam:  Blood pressure 130/80, pulse 60, temperature 36.5 °C (97.7 °F), temperature source Temporal, resp. rate 20, height 1.803 m (5' 10.98\"), weight 90.3 kg (199 lb), SpO2 95 %. Body mass index is 27.77 kg/m².    General:  Well nourished, well developed male in NAD  HENT:Head is grossly normal. PERRL.  Neck: Supple. Trachea is midline.  Pulmonary: Clear to ausculation .  Normal effort. No rales, ronchi, or " wheezing.   Cardiovascular: Regular rate and rhythm.  Abdomen-Abdomen is soft, No tenderness.  Upper extremities- Strong = . Good ROM  Lower extremities- neg for edema, redness, tenderness. Small  Calloused corn to right foot lateral plantar aspect- no redness or tenderness. Monofilament exam to both feet and NORMAL.  Neuro- A & O x 4. Speech clear and appropriate.    Current medications, allergies, and problem list reviewed with patient and updated in [facility] today.    Assessment/Plan:  1. Type 2 diabetes mellitus without complication, with long-term current use of insulin (HCC)  Diabetic Monofilament Lower Extremity Exam  Completed and NORMAL Bilat.    insulin glargine (LANTUS) 100 UNIT/ML Solution Pen-injector injection    metFORMIN (GLUCOPHAGE) 850 MG Tab    HEMOGLOBIN A1C    COMP METABOLIC PANEL    LIPID PROFILE    MICROALBUMIN CREAT RATIO URINE    POCT Retinal Eye Exam   2. Psychiatric disorder  Continue Abilify.   3. Osteomyelitis due to type 2 diabetes mellitus (HCC)  Resolved. Foot care discussed.     4. Vitamin D deficiency  Cholecalciferol 4000 units Cap    VITAMIN D,25 HYDROXY   5. Essential hypertension  amLODIPine (NORVASC) 5 MG Tab    lisinopril (PRINIVIL, ZESTRIL) 40 MG tablet   6. Other hyperlipidemia  atorvastatin (LIPITOR) 20 MG Tab   7. Nasal congestion  loratadine (CLARITIN) 10 MG Tab   8. Need for influenza vaccination  Influenza Vaccine Quad Injection >3Y (PF)    Pneumococal Polysaccharide Vaccine 23-Valent =>3yo SQ/IM   9. Need for 23-polyvalent pneumococcal polysaccharide vaccine  Pneumococal Polysaccharide Vaccine 23-Valent =>3yo SQ/IM   10. [place] of foot  ammonium lactate (LAC-HYDRIN) 12 % Lotion   11. Dry skin dermatitis  ammonium lactate (LAC-HYDRIN) 12 % Lotion   12. Screening for iron deficiency anemia  CBC WITH DIFFERENTIAL    IRON/TOTAL IRON BIND   13. Encounter for vitamin deficiency screening  VITAMIN B12   14. Screening for thyroid disorder  LIPID PROFILE    TSH       Return in  about 8 weeks (around 12/5/2018) for lab results.

## 2018-10-10 NOTE — ASSESSMENT & PLAN NOTE
REports BS at 8:30 this am=  310.  Is taking Metformin 850 TID and Lantus hs 30 units.  Last A1C= 12.0 8/10/18

## 2018-10-10 NOTE — ASSESSMENT & PLAN NOTE
Reports BS was 310 at 8;30 after drinking coffee and milk.  Denies any lows.  Due for REtina exam.   Right 4th toe missing due to surgical amputation.

## 2018-10-10 NOTE — ASSESSMENT & PLAN NOTE
Reports small corn on right foot lateral aspect for years.  Denies pain or drainage.  Has dry skin and rough  Recommend lac Hydrin lotion.

## 2018-10-10 NOTE — ASSESSMENT & PLAN NOTE
/80  in clinic today. Known hx of HTN.Pt stating that they have been taking their medication as prescribed without adverse effect. Pt denies HA, vision changes, neurologic deficits, CP, SOB.

## 2018-10-17 ENCOUNTER — HOSPITAL ENCOUNTER (OUTPATIENT)
Dept: LAB | Facility: MEDICAL CENTER | Age: 63
End: 2018-10-17
Attending: NURSE PRACTITIONER
Payer: MEDICAID

## 2018-10-17 DIAGNOSIS — Z13.29 SCREENING FOR THYROID DISORDER: ICD-10-CM

## 2018-10-17 DIAGNOSIS — E55.9 VITAMIN D DEFICIENCY: ICD-10-CM

## 2018-10-17 DIAGNOSIS — E11.9 TYPE 2 DIABETES MELLITUS WITHOUT COMPLICATION, WITH LONG-TERM CURRENT USE OF INSULIN (HCC): ICD-10-CM

## 2018-10-17 DIAGNOSIS — Z13.0 SCREENING FOR IRON DEFICIENCY ANEMIA: ICD-10-CM

## 2018-10-17 DIAGNOSIS — Z13.21 ENCOUNTER FOR VITAMIN DEFICIENCY SCREENING: ICD-10-CM

## 2018-10-17 DIAGNOSIS — Z79.4 TYPE 2 DIABETES MELLITUS WITHOUT COMPLICATION, WITH LONG-TERM CURRENT USE OF INSULIN (HCC): ICD-10-CM

## 2018-10-17 LAB
25(OH)D3 SERPL-MCNC: 47 NG/ML (ref 30–100)
ALBUMIN SERPL BCP-MCNC: 4.3 G/DL (ref 3.2–4.9)
ALBUMIN/GLOB SERPL: 1.3 G/DL
ALP SERPL-CCNC: 81 U/L (ref 30–99)
ALT SERPL-CCNC: 40 U/L (ref 2–50)
ANION GAP SERPL CALC-SCNC: 10 MMOL/L (ref 0–11.9)
AST SERPL-CCNC: 22 U/L (ref 12–45)
BASOPHILS # BLD AUTO: 0.9 % (ref 0–1.8)
BASOPHILS # BLD: 0.07 K/UL (ref 0–0.12)
BILIRUB SERPL-MCNC: 0.5 MG/DL (ref 0.1–1.5)
BUN SERPL-MCNC: 15 MG/DL (ref 8–22)
CALCIUM SERPL-MCNC: 9.5 MG/DL (ref 8.5–10.5)
CHLORIDE SERPL-SCNC: 104 MMOL/L (ref 96–112)
CHOLEST SERPL-MCNC: 165 MG/DL (ref 100–199)
CO2 SERPL-SCNC: 23 MMOL/L (ref 20–33)
CREAT SERPL-MCNC: 0.9 MG/DL (ref 0.5–1.4)
EOSINOPHIL # BLD AUTO: 0.07 K/UL (ref 0–0.51)
EOSINOPHIL NFR BLD: 0.9 % (ref 0–6.9)
ERYTHROCYTE [DISTWIDTH] IN BLOOD BY AUTOMATED COUNT: 47.5 FL (ref 35.9–50)
EST. AVERAGE GLUCOSE BLD GHB EST-MCNC: 249 MG/DL
FASTING STATUS PATIENT QL REPORTED: NORMAL
GLOBULIN SER CALC-MCNC: 3.3 G/DL (ref 1.9–3.5)
GLUCOSE SERPL-MCNC: 130 MG/DL (ref 65–99)
HBA1C MFR BLD: 10.3 % (ref 0–5.6)
HCT VFR BLD AUTO: 44.5 % (ref 42–52)
HDLC SERPL-MCNC: 50 MG/DL
HGB BLD-MCNC: 14.2 G/DL (ref 14–18)
IRON SATN MFR SERPL: 20 % (ref 15–55)
IRON SERPL-MCNC: 76 UG/DL (ref 50–180)
LDLC SERPL CALC-MCNC: 92 MG/DL
LYMPHOCYTES # BLD AUTO: 2.15 K/UL (ref 1–4.8)
LYMPHOCYTES NFR BLD: 25.9 % (ref 22–41)
MANUAL DIFF BLD: NORMAL
MCH RBC QN AUTO: 27.7 PG (ref 27–33)
MCHC RBC AUTO-ENTMCNC: 31.9 G/DL (ref 33.7–35.3)
MCV RBC AUTO: 86.9 FL (ref 81.4–97.8)
MONOCYTES # BLD AUTO: 0.51 K/UL (ref 0–0.85)
MONOCYTES NFR BLD AUTO: 6.2 % (ref 0–13.4)
MORPHOLOGY BLD-IMP: NORMAL
NEUTROPHILS # BLD AUTO: 5.49 K/UL (ref 1.82–7.42)
NEUTROPHILS NFR BLD: 64.3 % (ref 44–72)
NEUTS BAND NFR BLD MANUAL: 1.8 % (ref 0–10)
NRBC # BLD AUTO: 0 K/UL
NRBC BLD-RTO: 0 /100 WBC
PLATELET # BLD AUTO: 306 K/UL (ref 164–446)
PLATELET BLD QL SMEAR: NORMAL
PMV BLD AUTO: 10.2 FL (ref 9–12.9)
POIKILOCYTOSIS BLD QL SMEAR: NORMAL
POTASSIUM SERPL-SCNC: 3.8 MMOL/L (ref 3.6–5.5)
PROT SERPL-MCNC: 7.6 G/DL (ref 6–8.2)
RBC # BLD AUTO: 5.12 M/UL (ref 4.7–6.1)
RBC BLD AUTO: PRESENT
SODIUM SERPL-SCNC: 137 MMOL/L (ref 135–145)
TIBC SERPL-MCNC: 382 UG/DL (ref 250–450)
TRIGL SERPL-MCNC: 117 MG/DL (ref 0–149)
TSH SERPL DL<=0.005 MIU/L-ACNC: 1.62 UIU/ML (ref 0.38–5.33)
VIT B12 SERPL-MCNC: 823 PG/ML (ref 211–911)
WBC # BLD AUTO: 8.3 K/UL (ref 4.8–10.8)

## 2018-10-17 PROCEDURE — 85027 COMPLETE CBC AUTOMATED: CPT

## 2018-10-17 PROCEDURE — 83036 HEMOGLOBIN GLYCOSYLATED A1C: CPT

## 2018-10-17 PROCEDURE — 85007 BL SMEAR W/DIFF WBC COUNT: CPT

## 2018-10-17 PROCEDURE — 80053 COMPREHEN METABOLIC PANEL: CPT

## 2018-10-17 PROCEDURE — 83540 ASSAY OF IRON: CPT

## 2018-10-17 PROCEDURE — 36415 COLL VENOUS BLD VENIPUNCTURE: CPT

## 2018-10-17 PROCEDURE — 80061 LIPID PANEL: CPT

## 2018-10-17 PROCEDURE — 82607 VITAMIN B-12: CPT

## 2018-10-17 PROCEDURE — 83550 IRON BINDING TEST: CPT

## 2018-10-17 PROCEDURE — 84443 ASSAY THYROID STIM HORMONE: CPT

## 2018-10-17 PROCEDURE — 82306 VITAMIN D 25 HYDROXY: CPT

## 2018-10-18 ENCOUNTER — HOSPITAL ENCOUNTER (OUTPATIENT)
Facility: MEDICAL CENTER | Age: 63
End: 2018-10-18
Attending: NURSE PRACTITIONER
Payer: MEDICAID

## 2018-10-18 LAB
CREAT UR-MCNC: 70.3 MG/DL
MICROALBUMIN UR-MCNC: 1 MG/DL
MICROALBUMIN/CREAT UR: 14 MG/G (ref 0–30)

## 2018-10-18 PROCEDURE — 82043 UR ALBUMIN QUANTITATIVE: CPT

## 2018-10-18 PROCEDURE — 82570 ASSAY OF URINE CREATININE: CPT

## 2019-01-23 DIAGNOSIS — I10 ESSENTIAL HYPERTENSION: ICD-10-CM

## 2019-01-23 DIAGNOSIS — E11.9 TYPE 2 DIABETES MELLITUS WITHOUT COMPLICATION, WITH LONG-TERM CURRENT USE OF INSULIN (HCC): ICD-10-CM

## 2019-01-23 DIAGNOSIS — Z79.4 TYPE 2 DIABETES MELLITUS WITHOUT COMPLICATION, WITH LONG-TERM CURRENT USE OF INSULIN (HCC): ICD-10-CM

## 2019-01-23 RX ORDER — AMLODIPINE BESYLATE 5 MG/1
TABLET ORAL
Qty: 30 TAB | Refills: 2 | Status: SHIPPED | OUTPATIENT
Start: 2019-01-23 | End: 2019-06-28 | Stop reason: SDUPTHER

## 2019-01-23 RX ORDER — FLURBIPROFEN SODIUM 0.3 MG/ML
SOLUTION/ DROPS OPHTHALMIC
Qty: 100 EACH | Refills: 2 | Status: SHIPPED | OUTPATIENT
Start: 2019-01-23 | End: 2019-07-08 | Stop reason: SDUPTHER

## 2019-04-23 DIAGNOSIS — R09.81 NASAL CONGESTION: ICD-10-CM

## 2019-04-23 RX ORDER — LORATADINE 10 MG/1
10 TABLET ORAL DAILY
Qty: 30 TAB | Refills: 2 | Status: SHIPPED | OUTPATIENT
Start: 2019-04-23 | End: 2019-07-08 | Stop reason: SDUPTHER

## 2019-06-18 ENCOUNTER — OFFICE VISIT (OUTPATIENT)
Dept: MEDICAL GROUP | Facility: MEDICAL CENTER | Age: 64
End: 2019-06-18
Attending: FAMILY MEDICINE
Payer: MEDICAID

## 2019-06-18 DIAGNOSIS — L02.619 CELLULITIS AND ABSCESS OF FOOT: ICD-10-CM

## 2019-06-18 DIAGNOSIS — L97.511 SKIN ULCER OF RIGHT FOOT, LIMITED TO BREAKDOWN OF SKIN (HCC): ICD-10-CM

## 2019-06-18 DIAGNOSIS — L84 CALLUS OF FOOT: ICD-10-CM

## 2019-06-18 DIAGNOSIS — L03.119 CELLULITIS AND ABSCESS OF FOOT: ICD-10-CM

## 2019-06-18 DIAGNOSIS — E11.9 TYPE 2 DIABETES MELLITUS WITHOUT COMPLICATION, WITH LONG-TERM CURRENT USE OF INSULIN (HCC): ICD-10-CM

## 2019-06-18 DIAGNOSIS — Z79.4 TYPE 2 DIABETES MELLITUS WITHOUT COMPLICATION, WITH LONG-TERM CURRENT USE OF INSULIN (HCC): ICD-10-CM

## 2019-06-18 LAB
HBA1C MFR BLD: 12.1 % (ref 0–5.6)
INT CON NEG: NEGATIVE
INT CON POS: POSITIVE

## 2019-06-18 PROCEDURE — 99214 OFFICE O/P EST MOD 30 MIN: CPT | Performed by: FAMILY MEDICINE

## 2019-06-18 PROCEDURE — 83036 HEMOGLOBIN GLYCOSYLATED A1C: CPT | Performed by: FAMILY MEDICINE

## 2019-06-18 RX ORDER — CLINDAMYCIN HYDROCHLORIDE 300 MG/1
300 CAPSULE ORAL 3 TIMES DAILY
Qty: 30 CAP | Refills: 0 | Status: SHIPPED | OUTPATIENT
Start: 2019-06-18 | End: 2019-07-08

## 2019-06-18 NOTE — PROGRESS NOTES
Chief Complaint:   Chief Complaint   Patient presents with   • Foot Problem     ulcer R foot       HPI: Patient is new to me his PCP has left and he has an appointment is to establish with his new PCP in few weeks    Luzma historian  Louis Orourke is a 63 y.o. male who presents for concerns about wound/ulcer on his right foot, this is a patient with past medical history significant for uncontrolled diabetes, has not been seen by provider for long time after his PCP left I am highly suspecting issues with compliance and taking his medication today he said he needs more insulin because he ran out of his insulin.  And concerned about the ulcer in his right foot    Skin ulcer of right foot, limited to breakdown of skin   Patient reports that he noticed that he has some bleeding and ulcer  in his rt  foot 1 week ago, he gives remote history of falling after that he thinks he might injured the area of his previous toe amputation and he developed the bleeding he has history of an old callus/corn on his right foot that is now he think it is becoming an ulcer, he denies pain in his foot he said mild discomfort but there is no pain, denies fever denies other signs of systemic infection like nausea or vomiting      Callus of foot  Patient with callus on his left foot that is dry and not giving him any issues at this time is more concerned about the wound ulcer on the right foot at this time     DM (diabetes mellitus) type II uncontrolled,     Uncontrolled diabetes A1c at the office today around 12 he told me that he needs more medication and refills of his insulin, patient has not been seen by medical provider for more than 6 months after his PCP left, he is supposed to establish with PCP in few weeks.  He was not aware that he has an appointment to establish with PCP I had to give him the information today, he said that he has been taking 40 units of Basaglar insulin daily when I asked him about average fingerstick at  home he said it is below 180    Past medical history, family history, social history and medications reviewed and updated in the record.   Current medications, problem list and allergies reviewed in [facility]  Health maintenance topics are reviewed and updated.    Patient Active Problem List    Diagnosis Date Noted   • Osteomyelitis due to type 2 diabetes mellitus (HCC) 08/09/2018     Priority: High   • Uncontrolled diabetes mellitus (HCC) 08/09/2018     Priority: Medium   • Hypertension 11/06/2014     Priority: Medium   • Hyperlipidemia 04/09/2015     Priority: Low   • Corn of foot 10/10/2018   • Psychiatric disorder 08/29/2018   • POLST (Physician Orders for Life-Sustaining Treatment) 08/29/2018   • Vitamin D deficiency 04/18/2017   • Vertigo 05/23/2016   • Elevated PSA 04/09/2015   • Type 2 diabetes mellitus without complication (CMS-HCC) 11/06/2014   • Varicose veins of right lower extremity with pain 11/06/2014     Family History   Problem Relation Age of Onset   • Stroke Mother    • Heart Disease Mother    • Diabetes Mother    • Lung Disease Father      Social History     Social History   • Marital status: Single     Spouse name: N/A   • Number of children: N/A   • Years of education: N/A     Occupational History   • Not on file.     Social History Main Topics   • Smoking status: Never Smoker   • Smokeless tobacco: Never Used   • Alcohol use Yes      Comment: once in a while. A couple beers per week.   • Drug use: Yes     Types: Marijuana      Comment: Once in a while Crank, years ago LSD   • Sexual activity: Not Currently     Other Topics Concern   • Not on file     Social History Narrative   • No narrative on file     Current Outpatient Prescriptions   Medication Sig Dispense Refill   • clindamycin (CLEOCIN) 300 MG Cap Take 1 Cap by mouth 3 times a day. 30 Cap 0   • loratadine (CLARITIN) 10 MG Tab Take 1 Tab by mouth every day. 30 Tab 2   • amLODIPine (NORVASC) 5 MG Tab TAKE 1 TABLET ORALLY ONCE DAILY 30 Tab 2  "  • B-D UF III MINI PEN NEEDLES 31G X 5 MM Misc USE AS DIRECTED BY PHYSICIAN WITH INSULIN PEN EVERY  Each 2   • Cholecalciferol 4000 units Cap Take 1 Capsule by mouth every day. 30 Cap 5   • insulin glargine (LANTUS) 100 UNIT/ML Solution Pen-injector injection Inject 30 Units as instructed every evening. 15 mL 5   • lisinopril (PRINIVIL, ZESTRIL) 40 MG tablet Take 1 Tab by mouth every day. 30 Tab 5   • metFORMIN (GLUCOPHAGE) 850 MG Tab Take 1 Tab by mouth 3 times a day, with meals. 90 Tab 5   • atorvastatin (LIPITOR) 20 MG Tab Take 1 Tab by mouth every day. 30 Tab 5   • ammonium lactate (LAC-HYDRIN) 12 % Lotion Use 1/4 ribbon to each foot daily to decrease dryness and corns/cracks 1 Bottle 2   • ARIPiprazole (ABILIFY) 10 MG Tab Take 1 Tab by mouth every day. 30 Tab 0     No current facility-administered medications for this visit.          Review Of Systems  As documented in HPI above  PHYSICAL EXAMINATION:    /80 (BP Location: Left arm, Patient Position: Sitting, BP Cuff Size: Adult)   Pulse (!) 107   Temp 36.6 °C (97.9 °F) (Temporal)   Resp 18   Ht 1.803 m (5' 10.98\")   Wt 86.6 kg (191 lb)   SpO2 97%   BMI 26.65 kg/m²   Gen.: Well-developed, well-nourished, no apparent distress, patient is poor historian very quiet does not talk much, and cooperative with the examination  HEENT: Normocephalic/atraumatic, sinuses nontender with palpation, TMs clear, nares patent with pink mucosa and clear rhinorrhea, oropharynx clear  Neck: No JVD or bruits, no adenopathy  Cor: Regular rate and rhythm without murmur gallop or rub  Lungs: Clear to auscultation with equal breath sounds bilaterally. No wheezes, rhonchi.  Bilateral feet exam: Very poor hygiene, right foot with evidence of swelling signs of cellulitis described as erythema swelling, status post amputation of the fourth toe, evidence of an ulcer in the area of the callus and the corn the sole of the foot near the toe area with purulent discharge, left " foot without evidence of infection or cellulitis there is also an old callus that is dry without signs of inflammation located at the sole of the foot just below the fifth toe  Extremities: No cyanosis, clubbing or edema        ASSESSMENT/Plan:  1. Skin ulcer of right foot, limited to breakdown of skin (HCC)   diabetic foot ulcer related to callus/corn and possible trauma and fall causing this ulcer, patient with very poor hygiene I am suspecting also osteomyelitis, advised to do an MRI check blood work will refer him urgently to wound clinic and vascular surgeon, discussed importance of diabetes control  I am highly suspecting noncompliance I will follow-up with the patient on the phone in few days and if he did not establish with wound care in a timely manner I will definitely send him to hospital for IV antibiotics and further evaluation since he does not have primary care physician at this time he has an appointment to establish with his primary in few weeks.  Information about that appointment was discussed and relayed to patient today    MR-FOOT-W/O RIGHT    CBC WITH DIFFERENTIAL    Comp Metabolic Panel    clindamycin (CLEOCIN) 300 MG Cap    REFERRAL TO WOUND CLINIC    REFERRAL TO VASCULAR SURGERY   2. Cellulitis and abscess of foot  MR-FOOT-W/O RIGHT    CBC WITH DIFFERENTIAL    Comp Metabolic Panel    clindamycin (CLEOCIN) 300 MG Cap    REFERRAL TO WOUND CLINIC    REFERRAL TO VASCULAR SURGERY   3. Callus of foot   patient will need podiatry service and more care to his feet along with the diabetes control the callus in his left foot at this time is not active or inflamed yet he will need long-term care for both feet and diabetes.     4.  Diabetes :    Uncontrolled with A1c at around 12 part of it I think noncompliance and no regular medical care or follow-up, and part of it also is related to his infection.  We will do work-up to rule out sepsis.      Discussed with the patient to add regular insulin to his  regimen along with his Basaglar that he takes 40 units daily I advised the patient to take before each meal according to sliding scale and also will send him to reestablish with primary care physician at the clinic here and also to establish with our diabetes program at the pharmacy for better control of his diabetes.      Please note that this dictation was created using voice recognition software. I have made every reasonable attempt to correct obvious errors but there may be errors of grammar and content that I may have overlooked prior to finalization of this note.

## 2019-06-19 VITALS
SYSTOLIC BLOOD PRESSURE: 120 MMHG | WEIGHT: 191 LBS | HEART RATE: 107 BPM | DIASTOLIC BLOOD PRESSURE: 80 MMHG | OXYGEN SATURATION: 97 % | TEMPERATURE: 97.9 F | HEIGHT: 71 IN | RESPIRATION RATE: 18 BRPM | BODY MASS INDEX: 26.74 KG/M2

## 2019-06-21 ENCOUNTER — TELEPHONE (OUTPATIENT)
Dept: MEDICAL GROUP | Facility: MEDICAL CENTER | Age: 64
End: 2019-06-21

## 2019-06-21 NOTE — TELEPHONE ENCOUNTER
I have seen this patient on Tuesday, June 18, 2019, he does not have PCP he is supposed to establish with PCP in couple of weeks, patient had diabetic foot infection, I had concerns about osteomyelitis and systemic infection patient did not seem very compliant to me, today I checked his records it seems that he did not do his labs or followed up with wound care, although he was encouraged to do so urgently.    I would like you to place a follow-up call with the patient today, encouraged him to go to emergency room to have at least an IV antibiotics dose and take it from there.  So please let the patient know that he needs to go to emergency room

## 2019-06-26 ENCOUNTER — HOSPITAL ENCOUNTER (OUTPATIENT)
Dept: LAB | Facility: MEDICAL CENTER | Age: 64
End: 2019-06-26
Attending: FAMILY MEDICINE
Payer: MEDICAID

## 2019-06-26 DIAGNOSIS — L97.511 SKIN ULCER OF RIGHT FOOT, LIMITED TO BREAKDOWN OF SKIN (HCC): ICD-10-CM

## 2019-06-26 DIAGNOSIS — L02.619 CELLULITIS AND ABSCESS OF FOOT: ICD-10-CM

## 2019-06-26 DIAGNOSIS — L03.119 CELLULITIS AND ABSCESS OF FOOT: ICD-10-CM

## 2019-06-26 LAB
ALBUMIN SERPL BCP-MCNC: 4.2 G/DL (ref 3.2–4.9)
ALBUMIN/GLOB SERPL: 1.1 G/DL
ALP SERPL-CCNC: 114 U/L (ref 30–99)
ALT SERPL-CCNC: 27 U/L (ref 2–50)
ANION GAP SERPL CALC-SCNC: 12 MMOL/L (ref 0–11.9)
AST SERPL-CCNC: 23 U/L (ref 12–45)
BASOPHILS # BLD AUTO: 0.9 % (ref 0–1.8)
BASOPHILS # BLD: 0.07 K/UL (ref 0–0.12)
BILIRUB SERPL-MCNC: 0.5 MG/DL (ref 0.1–1.5)
BUN SERPL-MCNC: 19 MG/DL (ref 8–22)
CALCIUM SERPL-MCNC: 10.3 MG/DL (ref 8.5–10.5)
CHLORIDE SERPL-SCNC: 103 MMOL/L (ref 96–112)
CO2 SERPL-SCNC: 22 MMOL/L (ref 20–33)
CREAT SERPL-MCNC: 1.29 MG/DL (ref 0.5–1.4)
EOSINOPHIL # BLD AUTO: 0.25 K/UL (ref 0–0.51)
EOSINOPHIL NFR BLD: 3.2 % (ref 0–6.9)
ERYTHROCYTE [DISTWIDTH] IN BLOOD BY AUTOMATED COUNT: 43.3 FL (ref 35.9–50)
FASTING STATUS PATIENT QL REPORTED: NORMAL
GLOBULIN SER CALC-MCNC: 3.7 G/DL (ref 1.9–3.5)
GLUCOSE SERPL-MCNC: 251 MG/DL (ref 65–99)
HCT VFR BLD AUTO: 42.5 % (ref 42–52)
HGB BLD-MCNC: 13.3 G/DL (ref 14–18)
IMM GRANULOCYTES # BLD AUTO: 0.03 K/UL (ref 0–0.11)
IMM GRANULOCYTES NFR BLD AUTO: 0.4 % (ref 0–0.9)
LYMPHOCYTES # BLD AUTO: 1.96 K/UL (ref 1–4.8)
LYMPHOCYTES NFR BLD: 25.1 % (ref 22–41)
MCH RBC QN AUTO: 27 PG (ref 27–33)
MCHC RBC AUTO-ENTMCNC: 31.3 G/DL (ref 33.7–35.3)
MCV RBC AUTO: 86.4 FL (ref 81.4–97.8)
MONOCYTES # BLD AUTO: 0.55 K/UL (ref 0–0.85)
MONOCYTES NFR BLD AUTO: 7.1 % (ref 0–13.4)
NEUTROPHILS # BLD AUTO: 4.94 K/UL (ref 1.82–7.42)
NEUTROPHILS NFR BLD: 63.3 % (ref 44–72)
NRBC # BLD AUTO: 0.03 K/UL
NRBC BLD-RTO: 0.4 /100 WBC
PLATELET # BLD AUTO: 420 K/UL (ref 164–446)
PMV BLD AUTO: 10.5 FL (ref 9–12.9)
POTASSIUM SERPL-SCNC: 4.2 MMOL/L (ref 3.6–5.5)
PROT SERPL-MCNC: 7.9 G/DL (ref 6–8.2)
RBC # BLD AUTO: 4.92 M/UL (ref 4.7–6.1)
SODIUM SERPL-SCNC: 137 MMOL/L (ref 135–145)
WBC # BLD AUTO: 7.8 K/UL (ref 4.8–10.8)

## 2019-06-26 PROCEDURE — 36415 COLL VENOUS BLD VENIPUNCTURE: CPT

## 2019-06-26 PROCEDURE — 85025 COMPLETE CBC W/AUTO DIFF WBC: CPT

## 2019-06-26 PROCEDURE — 80053 COMPREHEN METABOLIC PANEL: CPT

## 2019-06-28 DIAGNOSIS — I10 ESSENTIAL HYPERTENSION: ICD-10-CM

## 2019-06-28 RX ORDER — AMLODIPINE BESYLATE 5 MG/1
TABLET ORAL
Qty: 30 TAB | Refills: 2 | Status: SHIPPED | OUTPATIENT
Start: 2019-06-28 | End: 2019-07-08 | Stop reason: SDUPTHER

## 2019-07-08 ENCOUNTER — TELEPHONE (OUTPATIENT)
Dept: MEDICAL GROUP | Facility: MEDICAL CENTER | Age: 64
End: 2019-07-08

## 2019-07-08 ENCOUNTER — OFFICE VISIT (OUTPATIENT)
Dept: MEDICAL GROUP | Facility: MEDICAL CENTER | Age: 64
End: 2019-07-08
Attending: NURSE PRACTITIONER
Payer: MEDICAID

## 2019-07-08 VITALS
RESPIRATION RATE: 16 BRPM | HEART RATE: 88 BPM | OXYGEN SATURATION: 96 % | SYSTOLIC BLOOD PRESSURE: 130 MMHG | DIASTOLIC BLOOD PRESSURE: 90 MMHG | BODY MASS INDEX: 27.77 KG/M2 | TEMPERATURE: 97.2 F | HEIGHT: 70 IN | WEIGHT: 194 LBS

## 2019-07-08 DIAGNOSIS — Z79.4 TYPE 2 DIABETES MELLITUS WITHOUT COMPLICATION, WITH LONG-TERM CURRENT USE OF INSULIN (HCC): ICD-10-CM

## 2019-07-08 DIAGNOSIS — F99 PSYCHIATRIC DISORDER: ICD-10-CM

## 2019-07-08 DIAGNOSIS — L97.511 SKIN ULCER OF RIGHT FOOT, LIMITED TO BREAKDOWN OF SKIN (HCC): ICD-10-CM

## 2019-07-08 DIAGNOSIS — Z12.11 SCREENING FOR MALIGNANT NEOPLASM OF COLON: ICD-10-CM

## 2019-07-08 DIAGNOSIS — I10 ESSENTIAL HYPERTENSION: ICD-10-CM

## 2019-07-08 DIAGNOSIS — E11.9 TYPE 2 DIABETES MELLITUS WITHOUT COMPLICATION, WITH LONG-TERM CURRENT USE OF INSULIN (HCC): ICD-10-CM

## 2019-07-08 DIAGNOSIS — E55.9 VITAMIN D DEFICIENCY: ICD-10-CM

## 2019-07-08 DIAGNOSIS — E78.49 OTHER HYPERLIPIDEMIA: ICD-10-CM

## 2019-07-08 DIAGNOSIS — R09.81 NASAL CONGESTION: ICD-10-CM

## 2019-07-08 PROCEDURE — 99213 OFFICE O/P EST LOW 20 MIN: CPT

## 2019-07-08 PROCEDURE — 99214 OFFICE O/P EST MOD 30 MIN: CPT | Performed by: NURSE PRACTITIONER

## 2019-07-08 RX ORDER — LISINOPRIL 40 MG/1
40 TABLET ORAL DAILY
Qty: 30 TAB | Refills: 2 | Status: SHIPPED | OUTPATIENT
Start: 2019-07-08 | End: 2019-08-09 | Stop reason: SDUPTHER

## 2019-07-08 RX ORDER — ARIPIPRAZOLE 10 MG/1
10 TABLET ORAL DAILY
Qty: 30 TAB | Refills: 2 | Status: SHIPPED | OUTPATIENT
Start: 2019-07-08 | End: 2019-08-09 | Stop reason: SDUPTHER

## 2019-07-08 RX ORDER — ATORVASTATIN CALCIUM 20 MG/1
20 TABLET, FILM COATED ORAL DAILY
Qty: 30 TAB | Refills: 2 | Status: SHIPPED | OUTPATIENT
Start: 2019-07-08 | End: 2019-08-09 | Stop reason: SDUPTHER

## 2019-07-08 RX ORDER — LORATADINE 10 MG/1
10 TABLET ORAL DAILY
Qty: 30 TAB | Refills: 2 | Status: SHIPPED | OUTPATIENT
Start: 2019-07-08

## 2019-07-08 RX ORDER — AMLODIPINE BESYLATE 5 MG/1
TABLET ORAL
Qty: 30 TAB | Refills: 2 | Status: SHIPPED | OUTPATIENT
Start: 2019-07-08 | End: 2019-08-09 | Stop reason: SDUPTHER

## 2019-07-08 ASSESSMENT — ENCOUNTER SYMPTOMS
DIARRHEA: 0
FEVER: 0
DOUBLE VISION: 0
COUGH: 0
DEPRESSION: 0
CHILLS: 0
BLURRED VISION: 0
ABDOMINAL PAIN: 0
TINGLING: 0
SORE THROAT: 0
DIZZINESS: 0
NAUSEA: 0
HEADACHES: 0
HEARTBURN: 0
CONSTIPATION: 0
WEIGHT LOSS: 0
VOMITING: 0
SENSORY CHANGE: 0
SINUS PAIN: 0
WEAKNESS: 0
BLOOD IN STOOL: 0
PALPITATIONS: 0

## 2019-07-08 ASSESSMENT — PATIENT HEALTH QUESTIONNAIRE - PHQ9: CLINICAL INTERPRETATION OF PHQ2 SCORE: 0

## 2019-07-08 NOTE — ASSESSMENT & PLAN NOTE
130/90. Pt reports compliance with medications today. Pt reports he does not check his BP at home.  Denies HA, blurred vison, and dizziness. Labs ordered to be completed prior to f/u in 1 month.

## 2019-07-08 NOTE — ASSESSMENT & PLAN NOTE
Stable. Pt denies current or past psychiatric disorder, however reports his mood is stable on abilify, reordered.

## 2019-07-08 NOTE — PROGRESS NOTES
Chief Complaint   Patient presents with   • Establish Care       Subjective:     HPI:   Louis Orourke is a 63 y.o. male here to establish care and to discuss the evaluation and management of:      No problem-specific Assessment & Plan notes found for this encounter.      ROS  ROS      No Known Allergies    Current medicines (including changes today)  Current Outpatient Prescriptions   Medication Sig Dispense Refill   • amLODIPine (NORVASC) 5 MG Tab TAKE 1 TABLET ORALLY ONCE DAILY 30 Tab 2   • insulin glargine (LANTUS) 100 UNIT/ML Solution Pen-injector injection Inject 40 Units as instructed every evening. 30 mL 5   • loratadine (CLARITIN) 10 MG Tab Take 1 Tab by mouth every day. 30 Tab 2   • B-D UF III MINI PEN NEEDLES 31G X 5 MM Misc USE AS DIRECTED BY PHYSICIAN WITH INSULIN PEN EVERY  Each 2   • Cholecalciferol 4000 units Cap Take 1 Capsule by mouth every day. 30 Cap 5   • lisinopril (PRINIVIL, ZESTRIL) 40 MG tablet Take 1 Tab by mouth every day. 30 Tab 5   • atorvastatin (LIPITOR) 20 MG Tab Take 1 Tab by mouth every day. 30 Tab 5   • ammonium lactate (LAC-HYDRIN) 12 % Lotion Use 1/4 ribbon to each foot daily to decrease dryness and corns/cracks 1 Bottle 2   • ARIPiprazole (ABILIFY) 10 MG Tab Take 1 Tab by mouth every day. 30 Tab 0     No current facility-administered medications for this visit.      He  has a past medical history of Hyperlipidemia; Hypertension; and Type II or unspecified type diabetes mellitus without mention of complication, not stated as uncontrolled.  He  has a past surgical history that includes irrigation & debridement ortho (Right, 8/12/2018) and toe amputation (Right, 8/12/2018).  Social History   Substance Use Topics   • Smoking status: Never Smoker   • Smokeless tobacco: Never Used   • Alcohol use 0.6 oz/week     1 Cans of beer per week       Family History   Problem Relation Age of Onset   • Stroke Mother    • Heart Disease Mother    • Diabetes Mother    • Lung Disease  "Father      Family Status   Relation Status   • Mo    • Fa Alive       Patient Active Problem List    Diagnosis Date Noted   • Osteomyelitis due to type 2 diabetes mellitus (HCC) 2018     Priority: High   • Uncontrolled diabetes mellitus (HCC) 2018     Priority: Medium   • Hypertension 2014     Priority: Medium   • Hyperlipidemia 2015     Priority: Low   • Nasal congestion 2019   • Corn of foot 10/10/2018   • Psychiatric disorder 2018   • POLST (Physician Orders for Life-Sustaining Treatment) 2018   • Vitamin D deficiency 2017   • Vertigo 2016   • Elevated PSA 2015   • Type 2 diabetes mellitus without complication (CMS-HCC) 2014   • Varicose veins of right lower extremity with pain 2014          Objective:     /90 (BP Location: Left arm, Patient Position: Sitting)   Pulse 88   Temp 36.2 °C (97.2 °F)   Resp 16   Ht 1.778 m (5' 10\")   Wt 88 kg (194 lb)   SpO2 96%  Body mass index is 27.84 kg/m².    Physical Exam:  Physical Exam       Assessment and Plan:     The following treatment plan was discussed:    1. Essential hypertension     2. Other hyperlipidemia     3. Vitamin D deficiency     4. Type 2 diabetes mellitus without complication, with long-term current use of insulin (HCC)     5. Nasal congestion     6. Screening for malignant neoplasm of colon         Any change or worsening of signs or symptoms, patient encouraged to follow-up or report to emergency room for further evaluation. Patient verbalizes understanding and agrees.    Follow-Up: No Follow-up on file.      PLEASE NOTE: This dictation was created using voice recognition software. I have made every reasonable attempt to correct obvious errors, but I expect that there are errors of grammar and possibly content that I did not discover before finalizing the note.    "

## 2019-07-08 NOTE — ASSESSMENT & PLAN NOTE
Uncontrolled, A1c in 6/19 was 12.1. Pt reports he randomly checks his BS at home.  He reports BS 150s-250s.  He reports that he has not had a low BS at home and that he has been using his sliding scale insulin most of the time.  Requested that pt check his BS in the am prior to food and 1 hour after either lunch or dinner, pt agreeable.  Updated metformin to max dose 1000mg BID.  Stopped sliding scale insulin R. Started trulicity 0.75mg once a week.  Denies vision changes.

## 2019-07-08 NOTE — ASSESSMENT & PLAN NOTE
Pt reports wound is improving from last visit. Pt denies fever, chills, redness, and pain. Pt reports he changes the dressing daily.  MA that dressed the wound on last visit confirms wound improvement.Pt reports he completed the ABX course prescribed by Dr. Hernandez on last visit (6/18).

## 2019-07-08 NOTE — PROGRESS NOTES
Chief Complaint   Patient presents with   • Establish Care       Subjective:     HPI:   Louis Orourke is a 63 y.o. male here to establish care and to discuss the evaluation and management of:      Hypertension  130/90. Pt reports compliance with medications today. Pt reports he does not check his BP at home.  Denies HA, blurred vison, and dizziness. Labs ordered to be completed prior to f/u in 1 month.    Skin ulcer of right foot, limited to breakdown of skin ([facility])  Pt reports wound is improving from last visit. Pt denies fever, chills, redness, and pain. Pt reports he changes the dressing daily.  MA that dressed the wound on last visit confirms wound improvement.Pt reports he completed the ABX course prescribed by Dr. Hernandez on last visit (6/18).      Nasal congestion  Stable, Claritin reordered. Pt denies congestion and headaches while on claritin.    Hyperlipidemia  Lipitor reordered, lipid profile ordered.      Psychiatric disorder  Stable. Pt denies current or past psychiatric disorder, however reports his mood is stable on abilify, reordered.      Vitamin D deficiency  Lab ordered to recheck levels.    Type 2 diabetes mellitus without complication, with long-term current use of insulin ([facility])  Uncontrolled, A1c in 6/19 was 12.1. Pt reports he randomly checks his BS at home.  He reports BS 150s-250s.  He reports that he has not had a low BS at home and that he has been using his sliding scale insulin most of the time.  Requested that pt check his BS in the am prior to food and 1 hour after either lunch or dinner, pt agreeable.  Updated metformin to max dose 1000mg BID.  Stopped sliding scale insulin R. Started trulicity 0.75mg once a week.  Denies vision changes.      ROS  Review of Systems   Constitutional: Negative for chills, fever, malaise/fatigue and weight loss.   HENT: Negative for congestion, sinus pain, sore throat and tinnitus.    Eyes: Negative for blurred vision and double vision.   Respiratory:  Negative for cough.    Cardiovascular: Negative for chest pain, palpitations and leg swelling.   Gastrointestinal: Negative for abdominal pain, blood in stool, constipation, diarrhea, heartburn, melena, nausea and vomiting.   Genitourinary: Negative.    Neurological: Negative for dizziness, tingling, sensory change, weakness and headaches.   Psychiatric/Behavioral: Negative for depression and suicidal ideas.         No Known Allergies    Current medicines (including changes today)  Current Outpatient Prescriptions   Medication Sig Dispense Refill   • [START ON 7/9/2019] Dulaglutide 0.75 MG/0.5ML Solution Pen-injector Inject 0.75 mg as instructed every Tuesday. 4 PEN 1   • metformin (GLUCOPHAGE) 1000 MG tablet Take 1 Tab by mouth 2 times a day, with meals. 60 Tab 3   • amLODIPine (NORVASC) 5 MG Tab TAKE 1 TABLET ORALLY ONCE DAILY 30 Tab 2   • ARIPiprazole (ABILIFY) 10 MG Tab Take 1 Tab by mouth every day. 30 Tab 2   • atorvastatin (LIPITOR) 20 MG Tab Take 1 Tab by mouth every day. 30 Tab 2   • Cholecalciferol 4000 units Cap Take 1 Capsule by mouth every day. 30 Cap 2   • insulin glargine (LANTUS) 100 UNIT/ML Solution Pen-injector injection Inject 40 Units as instructed every evening. 30 mL 5   • lisinopril (PRINIVIL, ZESTRIL) 40 MG tablet Take 1 Tab by mouth every day. 30 Tab 2   • loratadine (CLARITIN) 10 MG Tab Take 1 Tab by mouth every day. 30 Tab 2   • Insulin Pen Needle (B-D UF III MINI PEN NEEDLES) 31G X 5 MM Misc USE AS DIRECTED BY PHYSICIAN WITH INSULIN PEN EVERY  Each 2   • ammonium lactate (LAC-HYDRIN) 12 % Lotion Use 1/4 ribbon to each foot daily to decrease dryness and corns/cracks 1 Bottle 2     No current facility-administered medications for this visit.      He  has a past medical history of Hyperlipidemia; Hypertension; and Type II or unspecified type diabetes mellitus without mention of complication, not stated as uncontrolled.  He  has a past surgical history that includes irrigation &  "debridement ortho (Right, 2018) and toe amputation (Right, 2018).  Social History   Substance Use Topics   • Smoking status: Never Smoker   • Smokeless tobacco: Never Used   • Alcohol use 4.2 oz/week     7 Cans of beer per week       Family History   Problem Relation Age of Onset   • Stroke Mother    • Heart Disease Mother    • Diabetes Mother    • Lung Disease Father      Family Status   Relation Status   • Mo    • Fa Alive       Patient Active Problem List    Diagnosis Date Noted   • Osteomyelitis due to type 2 diabetes mellitus ([facility]) 2018     Priority: High   • Uncontrolled diabetes mellitus ([facility]) 2018     Priority: Medium   • Hypertension 2014     Priority: Medium   • Hyperlipidemia 2015     Priority: Low   • Nasal congestion 2019   • Skin ulcer of right foot, limited to breakdown of skin ([facility]) 2019   • Corn of foot 10/10/2018   • Psychiatric disorder 2018   • POLST (Physician Orders for Life-Sustaining Treatment) 2018   • Vitamin D deficiency 2017   • Vertigo 2016   • Elevated PSA 2015   • Type 2 diabetes mellitus without complication, with long-term current use of insulin ([facility]) 2014   • Varicose veins of right lower extremity with pain 2014          Objective:     /90 (BP Location: Left arm, Patient Position: Sitting)   Pulse 88   Temp 36.2 °C (97.2 °F)   Resp 16   Ht 1.778 m (5' 10\")   Wt 88 kg (194 lb)   SpO2 96%  Body mass index is 27.84 kg/m².    Physical Exam:  Physical Exam   Constitutional: He is well-developed, well-nourished, and in no distress.   HENT:   Head: Normocephalic.   Mouth/Throat: No oropharyngeal exudate.   Eyes: Pupils are equal, round, and reactive to light.   Neck: Normal range of motion. Neck supple. No tracheal deviation present.   Cardiovascular: Normal rate, regular rhythm and normal heart sounds.    No murmur heard.  Pulmonary/Chest: Effort normal and breath sounds normal. No " respiratory distress.   Abdominal: Soft. Bowel sounds are normal. He exhibits no distension. There is no tenderness.   Musculoskeletal: Normal range of motion.        Right foot: There is no tenderness and no swelling.        Feet:    Lymphadenopathy:     He has no cervical adenopathy.          Assessment and Plan:     The following treatment plan was discussed:    1. Type 2 diabetes mellitus without complication, with long-term current use of insulin (HCC)  -Pt interested in trying a simplified DM routine, the following medications were ordered.  Check labs and f/u in 1 month. Pt declining retinal screening.     Dulaglutide 0.75 MG/0.5ML Solution Pen-injector    metformin (GLUCOPHAGE) 1000 MG tablet    insulin glargine (LANTUS) 100 UNIT/ML Solution Pen-injector injection    CBC WITH DIFFERENTIAL    MICROALBUMIN CREAT RATIO URINE   2. Essential hypertension  amLODIPine (NORVASC) 5 MG Tab    lisinopril (PRINIVIL, ZESTRIL) 40 MG tablet    CBC WITH DIFFERENTIAL    Comp Metabolic Panel    MICROALBUMIN CREAT RATIO URINE   3. Other hyperlipidemia  atorvastatin (LIPITOR) 20 MG Tab    Lipid Profile   4. Vitamin D deficiency  Cholecalciferol 4000 units Cap    VITAMIN D,25 HYDROXY   5. Nasal congestion  loratadine (CLARITIN) 10 MG Tab   6. Screening for malignant neoplasm of colon  OCCULT BLOOD FECES IMMUNOASSAY (FIT)   7. Skin ulcer of right foot, limited to breakdown of skin (HCC)  -NO current s/sx of infection.        Pt declining wound care management due to financial concerns.  Pt instructed to keep wound clean and dry, change dressing daily, wound supplies provided.  Wound redressed.  Pt urged to return to office or ER if would worsens or development of redness, swelling, fever, or increased drainage.    8. Psychiatric disorder  Stable, Abilify reordered       Any change or worsening of signs or symptoms, patient encouraged to follow-up or report to emergency room for further evaluation. Patient verbalizes understanding  and agrees.    Follow-Up: Return in about 4 weeks (around 8/5/2019).

## 2019-07-08 NOTE — TELEPHONE ENCOUNTER
MEDICATION PRIOR AUTHORIZATION NEEDED:    1. Name of Medication: trulicity    2. Requested By (Name of Pharmacy): health care center     3. Is insurance on file current? yes    4. What is the name & phone number of the 3rd party payor? [place]

## 2019-07-10 ENCOUNTER — HOSPITAL ENCOUNTER (OUTPATIENT)
Facility: MEDICAL CENTER | Age: 64
End: 2019-07-10
Attending: NURSE PRACTITIONER
Payer: MEDICAID

## 2019-07-10 ENCOUNTER — HOSPITAL ENCOUNTER (OUTPATIENT)
Dept: LAB | Facility: MEDICAL CENTER | Age: 64
End: 2019-07-10
Attending: NURSE PRACTITIONER
Payer: MEDICAID

## 2019-07-10 DIAGNOSIS — E55.9 VITAMIN D DEFICIENCY: ICD-10-CM

## 2019-07-10 DIAGNOSIS — I10 ESSENTIAL HYPERTENSION: ICD-10-CM

## 2019-07-10 DIAGNOSIS — Z79.4 TYPE 2 DIABETES MELLITUS WITHOUT COMPLICATION, WITH LONG-TERM CURRENT USE OF INSULIN (HCC): ICD-10-CM

## 2019-07-10 DIAGNOSIS — E11.9 TYPE 2 DIABETES MELLITUS WITHOUT COMPLICATION, WITH LONG-TERM CURRENT USE OF INSULIN (HCC): ICD-10-CM

## 2019-07-10 DIAGNOSIS — E78.49 OTHER HYPERLIPIDEMIA: ICD-10-CM

## 2019-07-10 LAB
25(OH)D3 SERPL-MCNC: 24 NG/ML (ref 30–100)
ALBUMIN SERPL BCP-MCNC: 4 G/DL (ref 3.2–4.9)
ALBUMIN/GLOB SERPL: 1.1 G/DL
ALP SERPL-CCNC: 112 U/L (ref 30–99)
ALT SERPL-CCNC: 23 U/L (ref 2–50)
ANION GAP SERPL CALC-SCNC: 10 MMOL/L (ref 0–11.9)
AST SERPL-CCNC: 18 U/L (ref 12–45)
BASOPHILS # BLD AUTO: 1 % (ref 0–1.8)
BASOPHILS # BLD: 0.07 K/UL (ref 0–0.12)
BILIRUB SERPL-MCNC: 0.5 MG/DL (ref 0.1–1.5)
BUN SERPL-MCNC: 25 MG/DL (ref 8–22)
CALCIUM SERPL-MCNC: 10.5 MG/DL (ref 8.5–10.5)
CHLORIDE SERPL-SCNC: 103 MMOL/L (ref 96–112)
CHOLEST SERPL-MCNC: 160 MG/DL (ref 100–199)
CO2 SERPL-SCNC: 21 MMOL/L (ref 20–33)
CREAT SERPL-MCNC: 1.03 MG/DL (ref 0.5–1.4)
EOSINOPHIL # BLD AUTO: 0.23 K/UL (ref 0–0.51)
EOSINOPHIL NFR BLD: 3.1 % (ref 0–6.9)
ERYTHROCYTE [DISTWIDTH] IN BLOOD BY AUTOMATED COUNT: 47.8 FL (ref 35.9–50)
FASTING STATUS PATIENT QL REPORTED: NORMAL
GLOBULIN SER CALC-MCNC: 3.8 G/DL (ref 1.9–3.5)
GLUCOSE SERPL-MCNC: 276 MG/DL (ref 65–99)
HCT VFR BLD AUTO: 43.3 % (ref 42–52)
HDLC SERPL-MCNC: 43 MG/DL
HGB BLD-MCNC: 13.2 G/DL (ref 14–18)
IMM GRANULOCYTES # BLD AUTO: 0.02 K/UL (ref 0–0.11)
IMM GRANULOCYTES NFR BLD AUTO: 0.3 % (ref 0–0.9)
LDLC SERPL CALC-MCNC: 93 MG/DL
LYMPHOCYTES # BLD AUTO: 1.92 K/UL (ref 1–4.8)
LYMPHOCYTES NFR BLD: 26.3 % (ref 22–41)
MCH RBC QN AUTO: 26.9 PG (ref 27–33)
MCHC RBC AUTO-ENTMCNC: 30.5 G/DL (ref 33.7–35.3)
MCV RBC AUTO: 88.2 FL (ref 81.4–97.8)
MONOCYTES # BLD AUTO: 0.59 K/UL (ref 0–0.85)
MONOCYTES NFR BLD AUTO: 8.1 % (ref 0–13.4)
NEUTROPHILS # BLD AUTO: 4.48 K/UL (ref 1.82–7.42)
NEUTROPHILS NFR BLD: 61.2 % (ref 44–72)
NRBC # BLD AUTO: 0 K/UL
NRBC BLD-RTO: 0 /100 WBC
PLATELET # BLD AUTO: 276 K/UL (ref 164–446)
PMV BLD AUTO: 10.2 FL (ref 9–12.9)
POTASSIUM SERPL-SCNC: 4.2 MMOL/L (ref 3.6–5.5)
PROT SERPL-MCNC: 7.8 G/DL (ref 6–8.2)
RBC # BLD AUTO: 4.91 M/UL (ref 4.7–6.1)
SODIUM SERPL-SCNC: 134 MMOL/L (ref 135–145)
TRIGL SERPL-MCNC: 118 MG/DL (ref 0–149)
WBC # BLD AUTO: 7.3 K/UL (ref 4.8–10.8)

## 2019-07-10 PROCEDURE — 82306 VITAMIN D 25 HYDROXY: CPT

## 2019-07-10 PROCEDURE — 85025 COMPLETE CBC W/AUTO DIFF WBC: CPT

## 2019-07-10 PROCEDURE — 82274 ASSAY TEST FOR BLOOD FECAL: CPT

## 2019-07-10 PROCEDURE — 80061 LIPID PANEL: CPT

## 2019-07-10 PROCEDURE — 36415 COLL VENOUS BLD VENIPUNCTURE: CPT

## 2019-07-10 PROCEDURE — 80053 COMPREHEN METABOLIC PANEL: CPT

## 2019-07-11 ENCOUNTER — HOSPITAL ENCOUNTER (OUTPATIENT)
Facility: MEDICAL CENTER | Age: 64
End: 2019-07-11
Attending: NURSE PRACTITIONER
Payer: MEDICAID

## 2019-07-11 DIAGNOSIS — I10 ESSENTIAL HYPERTENSION: ICD-10-CM

## 2019-07-11 DIAGNOSIS — Z79.4 TYPE 2 DIABETES MELLITUS WITHOUT COMPLICATION, WITH LONG-TERM CURRENT USE OF INSULIN (HCC): ICD-10-CM

## 2019-07-11 DIAGNOSIS — E11.9 TYPE 2 DIABETES MELLITUS WITHOUT COMPLICATION, WITH LONG-TERM CURRENT USE OF INSULIN (HCC): ICD-10-CM

## 2019-07-11 DIAGNOSIS — Z12.11 SCREENING FOR MALIGNANT NEOPLASM OF COLON: ICD-10-CM

## 2019-07-11 LAB
CREAT UR-MCNC: 165.8 MG/DL
HEMOCCULT STL QL IA: NEGATIVE
MICROALBUMIN UR-MCNC: 5.4 MG/DL
MICROALBUMIN/CREAT UR: 33 MG/G (ref 0–30)

## 2019-07-11 PROCEDURE — 82570 ASSAY OF URINE CREATININE: CPT

## 2019-07-11 PROCEDURE — 82043 UR ALBUMIN QUANTITATIVE: CPT

## 2019-08-06 ENCOUNTER — DOCUMENTATION (OUTPATIENT)
Dept: MEDICAL GROUP | Facility: MEDICAL CENTER | Age: 64
End: 2019-08-06

## 2019-08-06 NOTE — PROGRESS NOTES
I tried to contact patient and get his to establish with our diabetes services after receiving his referral but patient refuses to start our services.

## 2019-08-09 ENCOUNTER — OFFICE VISIT (OUTPATIENT)
Dept: MEDICAL GROUP | Facility: MEDICAL CENTER | Age: 64
End: 2019-08-09
Attending: NURSE PRACTITIONER
Payer: MEDICAID

## 2019-08-09 VITALS
BODY MASS INDEX: 28.2 KG/M2 | HEART RATE: 94 BPM | HEIGHT: 70 IN | RESPIRATION RATE: 20 BRPM | WEIGHT: 197 LBS | OXYGEN SATURATION: 99 % | TEMPERATURE: 98.3 F | DIASTOLIC BLOOD PRESSURE: 100 MMHG | SYSTOLIC BLOOD PRESSURE: 130 MMHG

## 2019-08-09 DIAGNOSIS — E78.49 OTHER HYPERLIPIDEMIA: ICD-10-CM

## 2019-08-09 DIAGNOSIS — E11.9 TYPE 2 DIABETES MELLITUS WITHOUT COMPLICATION, WITH LONG-TERM CURRENT USE OF INSULIN (HCC): ICD-10-CM

## 2019-08-09 DIAGNOSIS — L97.511 SKIN ULCER OF RIGHT FOOT, LIMITED TO BREAKDOWN OF SKIN (HCC): ICD-10-CM

## 2019-08-09 DIAGNOSIS — Z79.4 TYPE 2 DIABETES MELLITUS WITHOUT COMPLICATION, WITH LONG-TERM CURRENT USE OF INSULIN (HCC): ICD-10-CM

## 2019-08-09 DIAGNOSIS — I10 ESSENTIAL HYPERTENSION: ICD-10-CM

## 2019-08-09 LAB
HBA1C MFR BLD: 9.5 % (ref 0–5.6)
INT CON NEG: NEGATIVE
INT CON POS: POSITIVE

## 2019-08-09 PROCEDURE — 83036 HEMOGLOBIN GLYCOSYLATED A1C: CPT | Performed by: NURSE PRACTITIONER

## 2019-08-09 PROCEDURE — 99203 OFFICE O/P NEW LOW 30 MIN: CPT | Performed by: NURSE PRACTITIONER

## 2019-08-09 PROCEDURE — 99214 OFFICE O/P EST MOD 30 MIN: CPT | Performed by: NURSE PRACTITIONER

## 2019-08-09 RX ORDER — ATORVASTATIN CALCIUM 20 MG/1
20 TABLET, FILM COATED ORAL DAILY
Qty: 30 TAB | Refills: 5 | Status: SHIPPED | OUTPATIENT
Start: 2019-08-09

## 2019-08-09 RX ORDER — AMLODIPINE BESYLATE 5 MG/1
TABLET ORAL
Qty: 30 TAB | Refills: 5 | Status: SHIPPED | OUTPATIENT
Start: 2019-08-09

## 2019-08-09 RX ORDER — LISINOPRIL 40 MG/1
40 TABLET ORAL DAILY
Qty: 30 TAB | Refills: 5 | Status: SHIPPED | OUTPATIENT
Start: 2019-08-09

## 2019-08-09 RX ORDER — FLUTICASONE PROPIONATE 50 MCG
1 SPRAY, SUSPENSION (ML) NASAL DAILY
Qty: 16 G | Refills: 11 | Status: SHIPPED | OUTPATIENT
Start: 2019-08-09

## 2019-08-09 RX ORDER — ARIPIPRAZOLE 10 MG/1
10 TABLET ORAL DAILY
Qty: 30 TAB | Refills: 2 | Status: SHIPPED | OUTPATIENT
Start: 2019-08-09

## 2019-08-09 ASSESSMENT — ENCOUNTER SYMPTOMS
COUGH: 0
ABDOMINAL PAIN: 0
BLOOD IN STOOL: 0
WHEEZING: 0
WEIGHT LOSS: 0
PALPITATIONS: 0
CONSTIPATION: 0
CHILLS: 0
DIARRHEA: 0
SHORTNESS OF BREATH: 0
FEVER: 0

## 2019-08-09 NOTE — ASSESSMENT & PLAN NOTE
"Improving.  A1c today was 9.5, compared to 1 month ago at 12.1.  A month ago I had prescribed Trulicity, unfortunately it was denied by his insurance.  The patient reports that he has been doing 1000 mg of metformin twice daily in combination with 80 units of Lantus nightly.  His Lantus was previously prescribed at 40 units, he update on his own.  The patient reports he \"sometimes\" checks his blood sugar.  He reports the lowest blood sugar he has gotten is 129 with averages around the high 180s.  The patient denies signs and symptoms of low blood sugar.  The patient had previously declined to see Umair Pharm.D. for medication adjustments.  After discussion regarding medication safety and the potential to simplify his education routine, the patient has agreed to see Umair.  We also discussed the importance of checking his blood sugar twice a day and with signs and symptoms of low blood sugar considering he is on high amount of insulin at this point. The patient verbalized understanding and accepts the risk.  I briefly discussed the patient's case with is her in the pharmacy and she reports she will reach out to the patient and get him in next week.  At that point we will try and get him on likely Ozempic.  The patient is declining microfilament testing and an ophthalmology referral for retinal screening at this point.  He reports he will let me do things on the next visit.  "

## 2019-08-09 NOTE — PROGRESS NOTES
"No chief complaint on file.      Subjective:     HPI:   Louis Orourke is a 63 y.o. male here to discuss the evaluation and management of:        Type 2 diabetes mellitus without complication, with long-term current use of insulin (HCC)  Improving.  A1c today was 9.5, compared to 1 month ago at 12.1.  A month ago I had prescribed Trulicity, unfortunately it was denied by his insurance.  The patient reports that he has been doing 1000 mg of metformin twice daily in combination with 80 units of Lantus nightly.  His Lantus was previously prescribed at 40 units, he update on his own.  The patient reports he \"sometimes\" checks his blood sugar.  He reports the lowest blood sugar he has gotten is 129 with averages around the high 180s.  The patient denies signs and symptoms of low blood sugar.  The patient had previously declined to see Umair Pharm.D. for medication adjustments.  After discussion regarding medication safety and the potential to simplify his education routine, the patient has agreed to see Umair.  We also discussed the importance of checking his blood sugar twice a day and with signs and symptoms of low blood sugar considering he is on high amount of insulin at this point. The patient verbalized understanding and accepts the risk.  I briefly discussed the patient's case with is her in the pharmacy and she reports she will reach out to the patient and get him in next week.  At that point we will try and get him on likely Ozempic.  The patient is declining microfilament testing and an ophthalmology referral for retinal screening at this point.  He reports he will let me do things on the next visit.    Skin ulcer of right foot, limited to breakdown of skin (HCC)  Improving.  The patient reports that the wound has stopped bleeding.  Patient denies fever and drainage from the wound.  The patient declines wound care at this point.  I have encouraged the patient to keep the wound clean and dry and to check his " feet daily for wounds.      ROS  Review of Systems   Constitutional: Negative for chills, fever, malaise/fatigue and weight loss.   Respiratory: Negative for cough, shortness of breath and wheezing.    Cardiovascular: Negative for chest pain, palpitations and leg swelling.   Gastrointestinal: Negative for abdominal pain, blood in stool, constipation and diarrhea.         No Known Allergies    Current medicines (including changes today)  Current Outpatient Medications   Medication Sig Dispense Refill   • insulin glargine (LANTUS) 100 UNIT/ML Solution Pen-injector injection Inject 80 Units as instructed every evening. 8 PEN 1   • lisinopril (PRINIVIL, ZESTRIL) 40 MG tablet Take 1 Tab by mouth every day. 30 Tab 5   • atorvastatin (LIPITOR) 20 MG Tab Take 1 Tab by mouth every day. 30 Tab 5   • ARIPiprazole (ABILIFY) 10 MG Tab Take 1 Tab by mouth every day. 30 Tab 2   • amLODIPine (NORVASC) 5 MG Tab TAKE 1 TABLET ORALLY ONCE DAILY 30 Tab 5   • fluticasone (FLONASE) 50 MCG/ACT nasal spray Spray 1 Spray in nose every day. 16 g 11   • Dulaglutide 0.75 MG/0.5ML Solution Pen-injector Inject 0.75 mg as instructed every Tuesday. 4 PEN 1   • metformin (GLUCOPHAGE) 1000 MG tablet Take 1 Tab by mouth 2 times a day, with meals. 60 Tab 3   • Cholecalciferol 4000 units Cap Take 1 Capsule by mouth every day. 30 Cap 2   • loratadine (CLARITIN) 10 MG Tab Take 1 Tab by mouth every day. 30 Tab 2   • Insulin Pen Needle (B-D UF III MINI PEN NEEDLES) 31G X 5 MM Misc USE AS DIRECTED BY PHYSICIAN WITH INSULIN PEN EVERY  Each 2   • ammonium lactate (LAC-HYDRIN) 12 % Lotion Use 1/4 ribbon to each foot daily to decrease dryness and corns/cracks 1 Bottle 2     No current facility-administered medications for this visit.        Social History     Tobacco Use   • Smoking status: Never Smoker   • Smokeless tobacco: Never Used   Substance Use Topics   • Alcohol use: Yes     Alcohol/week: 4.2 oz     Types: 7 Cans of beer per week   • Drug use:  "Yes     Types: Marijuana, Methamphetamines     Comment: Once in a while Crank about once a month, years ago LSD       Patient Active Problem List    Diagnosis Date Noted   • Osteomyelitis due to type 2 diabetes mellitus ([facility]) 08/09/2018     Priority: High   • Uncontrolled diabetes mellitus ([facility]) 08/09/2018     Priority: Medium   • Hypertension 11/06/2014     Priority: Medium   • Hyperlipidemia 04/09/2015     Priority: Low   • Nasal congestion 07/08/2019   • Skin ulcer of right foot, limited to breakdown of skin ([facility]) 07/08/2019   • Corn of foot 10/10/2018   • Psychiatric disorder 08/29/2018   • POLST (Physician Orders for Life-Sustaining Treatment) 08/29/2018   • Vitamin D deficiency 04/18/2017   • Vertigo 05/23/2016   • Elevated PSA 04/09/2015   • Type 2 diabetes mellitus without complication, with long-term current use of insulin ([facility]) 11/06/2014   • Varicose veins of right lower extremity with pain 11/06/2014       Family History   Problem Relation Age of Onset   • Stroke Mother    • Heart Disease Mother    • Diabetes Mother    • Lung Disease Father           Objective:     /100 (BP Location: Left arm, Patient Position: Sitting)   Pulse 94   Temp 36.8 °C (98.3 °F)   Resp 20   Ht 1.778 m (5' 10\")   Wt 89.4 kg (197 lb)   SpO2 99%  Body mass index is 28.27 kg/m².    Physical Exam:  Physical Exam   Constitutional: He is oriented to person, place, and time and well-developed, well-nourished, and in no distress. No distress.   HENT:   Head: Normocephalic.   Right Ear: Tympanic membrane and external ear normal.   Left Ear: Tympanic membrane and external ear normal.   Eyes: Pupils are equal, round, and reactive to light. Conjunctivae and EOM are normal.   Neck: Normal range of motion. Neck supple. No tracheal deviation present.   Cardiovascular: Normal rate, regular rhythm, normal heart sounds and intact distal pulses.   Pulmonary/Chest: Effort normal and breath sounds normal.   Abdominal: Soft. Bowel sounds " are normal.   Musculoskeletal: Normal range of motion.   Lymphadenopathy:        Head (right side): No preauricular adenopathy present.        Head (left side): No preauricular adenopathy present.     He has no cervical adenopathy.   Neurological: He is alert and oriented to person, place, and time. He has normal sensation, normal strength and intact cranial nerves. Gait normal.   Skin: Skin is warm and dry.   Psychiatric: Affect and judgment normal.       Assessment and Plan:     The following treatment plan was discussed:    1. Type 2 diabetes mellitus without complication, with long-term current use of insulin (HCC)  insulin glargine (LANTUS) 100 UNIT/ML Solution Pen-injector injection    REFERRAL TO PHARMACOTHERAPY SERVICE  -Spoke with Umair, she will reach out to the patient and get him in for a visit with her next week.   2. Essential hypertension  lisinopril (PRINIVIL, ZESTRIL) 40 MG tablet    amLODIPine (NORVASC) 5 MG Tab   3. Other hyperlipidemia  atorvastatin (LIPITOR) 20 MG Tab   4. Skin ulcer of right foot, limited to breakdown of skin (HCC)  -continue to monitor and keep the site clean and dry.       Any change or worsening of signs or symptoms, patient encouraged to follow-up or report to emergency room for further evaluation. Patient verbalizes understanding and agrees.    Follow-Up: Return in about 2 months (around 10/9/2019) for Diabetes with NO RN.       PLEASE NOTE: This dictation was created using voice recognition software. I have made every reasonable attempt to correct obvious errors, but I expect that there are errors of grammar and possibly content that I did not discover before finalizing the note.

## 2019-08-09 NOTE — ASSESSMENT & PLAN NOTE
Improving.  The patient reports that the wound has stopped bleeding.  Patient denies fever and drainage from the wound.  The patient declines wound care at this point.  I have encouraged the patient to keep the wound clean and dry and to check his feet daily for wounds.

## 2019-09-06 DIAGNOSIS — E11.9 TYPE 2 DIABETES MELLITUS WITHOUT COMPLICATION, WITH LONG-TERM CURRENT USE OF INSULIN (HCC): ICD-10-CM

## 2019-09-06 DIAGNOSIS — Z79.4 TYPE 2 DIABETES MELLITUS WITHOUT COMPLICATION, WITH LONG-TERM CURRENT USE OF INSULIN (HCC): ICD-10-CM

## 2020-09-19 ENCOUNTER — APPOINTMENT (OUTPATIENT)
Dept: RADIOLOGY | Facility: MEDICAL CENTER | Age: 65
DRG: 638 | End: 2020-09-19
Payer: COMMERCIAL

## 2020-09-19 ENCOUNTER — HOSPITAL ENCOUNTER (INPATIENT)
Facility: MEDICAL CENTER | Age: 65
LOS: 6 days | DRG: 638 | End: 2020-09-25
Attending: EMERGENCY MEDICINE | Admitting: INTERNAL MEDICINE
Payer: COMMERCIAL

## 2020-09-19 ENCOUNTER — APPOINTMENT (OUTPATIENT)
Dept: RADIOLOGY | Facility: MEDICAL CENTER | Age: 65
DRG: 638 | End: 2020-09-19
Attending: EMERGENCY MEDICINE
Payer: COMMERCIAL

## 2020-09-19 DIAGNOSIS — E11.00 DIABETIC HYPEROSMOLAR NON-KETOTIC STATE (HCC): ICD-10-CM

## 2020-09-19 DIAGNOSIS — E87.20 LACTIC ACIDOSIS: ICD-10-CM

## 2020-09-19 DIAGNOSIS — N17.9 ACUTE KIDNEY INJURY (HCC): ICD-10-CM

## 2020-09-19 DIAGNOSIS — E86.0 DEHYDRATION: ICD-10-CM

## 2020-09-19 PROBLEM — D72.829 LEUKOCYTOSIS: Status: ACTIVE | Noted: 2020-09-19

## 2020-09-19 PROBLEM — D64.9 ANEMIA, UNSPECIFIED: Status: ACTIVE | Noted: 2020-09-19

## 2020-09-19 LAB
ALBUMIN SERPL BCP-MCNC: 3.8 G/DL (ref 3.2–4.9)
ALBUMIN/GLOB SERPL: 1.3 G/DL
ALP SERPL-CCNC: 128 U/L (ref 30–99)
ALT SERPL-CCNC: 9 U/L (ref 2–50)
ANION GAP SERPL CALC-SCNC: 19 MMOL/L (ref 7–16)
ANION GAP SERPL CALC-SCNC: 22 MMOL/L (ref 7–16)
APPEARANCE UR: ABNORMAL
AST SERPL-CCNC: 12 U/L (ref 12–45)
B-OH-BUTYR SERPL-MCNC: 0.2 MMOL/L (ref 0.02–0.27)
BACTERIA #/AREA URNS HPF: NEGATIVE /HPF
BASE EXCESS BLDV CALC-SCNC: -10 MMOL/L
BASOPHILS # BLD AUTO: 0.3 % (ref 0–1.8)
BASOPHILS # BLD: 0.04 K/UL (ref 0–0.12)
BILIRUB SERPL-MCNC: 0.2 MG/DL (ref 0.1–1.5)
BILIRUB UR QL STRIP.AUTO: NEGATIVE
BODY TEMPERATURE: ABNORMAL CENTIGRADE
BUN SERPL-MCNC: 30 MG/DL (ref 8–22)
BUN SERPL-MCNC: 31 MG/DL (ref 8–22)
CALCIUM SERPL-MCNC: 9.4 MG/DL (ref 8.5–10.5)
CALCIUM SERPL-MCNC: 9.5 MG/DL (ref 8.5–10.5)
CHLORIDE SERPL-SCNC: 100 MMOL/L (ref 96–112)
CHLORIDE SERPL-SCNC: 94 MMOL/L (ref 96–112)
CO2 SERPL-SCNC: 13 MMOL/L (ref 20–33)
CO2 SERPL-SCNC: 14 MMOL/L (ref 20–33)
COLOR UR: YELLOW
COVID ORDER STATUS COVID19: NORMAL
CREAT SERPL-MCNC: 2.25 MG/DL (ref 0.5–1.4)
CREAT SERPL-MCNC: 2.4 MG/DL (ref 0.5–1.4)
EKG IMPRESSION: NORMAL
EOSINOPHIL # BLD AUTO: 0.06 K/UL (ref 0–0.51)
EOSINOPHIL NFR BLD: 0.5 % (ref 0–6.9)
EPI CELLS #/AREA URNS HPF: ABNORMAL /HPF
ERYTHROCYTE [DISTWIDTH] IN BLOOD BY AUTOMATED COUNT: 41.8 FL (ref 35.9–50)
GLOBULIN SER CALC-MCNC: 2.9 G/DL (ref 1.9–3.5)
GLUCOSE BLD-MCNC: 426 MG/DL (ref 65–99)
GLUCOSE BLD-MCNC: 476 MG/DL (ref 65–99)
GLUCOSE BLD-MCNC: >600 MG/DL (ref 65–99)
GLUCOSE SERPL-MCNC: 719 MG/DL (ref 65–99)
GLUCOSE SERPL-MCNC: 720 MG/DL (ref 65–99)
GLUCOSE UR STRIP.AUTO-MCNC: >=1000 MG/DL
HCO3 BLDV-SCNC: 13 MMOL/L (ref 24–28)
HCT VFR BLD AUTO: 32.3 % (ref 42–52)
HGB BLD-MCNC: 10.4 G/DL (ref 14–18)
HYALINE CASTS #/AREA URNS LPF: ABNORMAL /LPF
IMM GRANULOCYTES # BLD AUTO: 0.08 K/UL (ref 0–0.11)
IMM GRANULOCYTES NFR BLD AUTO: 0.7 % (ref 0–0.9)
KETONES UR STRIP.AUTO-MCNC: NEGATIVE MG/DL
LACTATE BLD-SCNC: 5 MMOL/L (ref 0.5–2)
LACTATE BLD-SCNC: 6.2 MMOL/L (ref 0.5–2)
LEUKOCYTE ESTERASE UR QL STRIP.AUTO: NEGATIVE
LIPASE SERPL-CCNC: 57 U/L (ref 11–82)
LYMPHOCYTES # BLD AUTO: 0.9 K/UL (ref 1–4.8)
LYMPHOCYTES NFR BLD: 7.4 % (ref 22–41)
MAGNESIUM SERPL-MCNC: 1.7 MG/DL (ref 1.5–2.5)
MCH RBC QN AUTO: 27.6 PG (ref 27–33)
MCHC RBC AUTO-ENTMCNC: 32.2 G/DL (ref 33.7–35.3)
MCV RBC AUTO: 85.7 FL (ref 81.4–97.8)
MICRO URNS: ABNORMAL
MONOCYTES # BLD AUTO: 0.73 K/UL (ref 0–0.85)
MONOCYTES NFR BLD AUTO: 6 % (ref 0–13.4)
NEUTROPHILS # BLD AUTO: 10.33 K/UL (ref 1.82–7.42)
NEUTROPHILS NFR BLD: 85.1 % (ref 44–72)
NITRITE UR QL STRIP.AUTO: NEGATIVE
NRBC # BLD AUTO: 0 K/UL
NRBC BLD-RTO: 0 /100 WBC
PCO2 BLDV: 22 MMHG (ref 41–51)
PH BLDV: 7.4 [PH] (ref 7.31–7.45)
PH UR STRIP.AUTO: 5.5 [PH] (ref 5–8)
PLATELET # BLD AUTO: 309 K/UL (ref 164–446)
PMV BLD AUTO: 10.2 FL (ref 9–12.9)
PO2 BLDV: 31.9 MMHG (ref 25–40)
POTASSIUM SERPL-SCNC: 3.7 MMOL/L (ref 3.6–5.5)
POTASSIUM SERPL-SCNC: 3.8 MMOL/L (ref 3.6–5.5)
PROT SERPL-MCNC: 6.7 G/DL (ref 6–8.2)
PROT UR QL STRIP: NEGATIVE MG/DL
RBC # BLD AUTO: 3.77 M/UL (ref 4.7–6.1)
RBC # URNS HPF: ABNORMAL /HPF
RBC UR QL AUTO: NEGATIVE
SAO2 % BLDV: 60.7 %
SARS-COV-2 RNA RESP QL NAA+PROBE: NOTDETECTED
SODIUM SERPL-SCNC: 129 MMOL/L (ref 135–145)
SODIUM SERPL-SCNC: 133 MMOL/L (ref 135–145)
SP GR UR STRIP.AUTO: 1.02
SPECIMEN SOURCE: NORMAL
UROBILINOGEN UR STRIP.AUTO-MCNC: 0.2 MG/DL
WBC # BLD AUTO: 12.1 K/UL (ref 4.8–10.8)
WBC #/AREA URNS HPF: ABNORMAL /HPF

## 2020-09-19 PROCEDURE — 96375 TX/PRO/DX INJ NEW DRUG ADDON: CPT

## 2020-09-19 PROCEDURE — 82010 KETONE BODYS QUAN: CPT

## 2020-09-19 PROCEDURE — C9803 HOPD COVID-19 SPEC COLLECT: HCPCS | Performed by: INTERNAL MEDICINE

## 2020-09-19 PROCEDURE — U0003 INFECTIOUS AGENT DETECTION BY NUCLEIC ACID (DNA OR RNA); SEVERE ACUTE RESPIRATORY SYNDROME CORONAVIRUS 2 (SARS-COV-2) (CORONAVIRUS DISEASE [COVID-19]), AMPLIFIED PROBE TECHNIQUE, MAKING USE OF HIGH THROUGHPUT TECHNOLOGIES AS DESCRIBED BY CMS-2020-01-R: HCPCS

## 2020-09-19 PROCEDURE — 71045 X-RAY EXAM CHEST 1 VIEW: CPT

## 2020-09-19 PROCEDURE — 700102 HCHG RX REV CODE 250 W/ 637 OVERRIDE(OP): Performed by: INTERNAL MEDICINE

## 2020-09-19 PROCEDURE — 87077 CULTURE AEROBIC IDENTIFY: CPT

## 2020-09-19 PROCEDURE — 81001 URINALYSIS AUTO W/SCOPE: CPT

## 2020-09-19 PROCEDURE — 80053 COMPREHEN METABOLIC PANEL: CPT

## 2020-09-19 PROCEDURE — 700101 HCHG RX REV CODE 250: Performed by: INTERNAL MEDICINE

## 2020-09-19 PROCEDURE — 82803 BLOOD GASES ANY COMBINATION: CPT

## 2020-09-19 PROCEDURE — 96366 THER/PROPH/DIAG IV INF ADDON: CPT

## 2020-09-19 PROCEDURE — 93005 ELECTROCARDIOGRAM TRACING: CPT | Performed by: EMERGENCY MEDICINE

## 2020-09-19 PROCEDURE — 99291 CRITICAL CARE FIRST HOUR: CPT

## 2020-09-19 PROCEDURE — 85025 COMPLETE CBC W/AUTO DIFF WBC: CPT

## 2020-09-19 PROCEDURE — 83735 ASSAY OF MAGNESIUM: CPT

## 2020-09-19 PROCEDURE — 80048 BASIC METABOLIC PNL TOTAL CA: CPT

## 2020-09-19 PROCEDURE — 99291 CRITICAL CARE FIRST HOUR: CPT | Performed by: INTERNAL MEDICINE

## 2020-09-19 PROCEDURE — 83605 ASSAY OF LACTIC ACID: CPT

## 2020-09-19 PROCEDURE — 87086 URINE CULTURE/COLONY COUNT: CPT

## 2020-09-19 PROCEDURE — 36415 COLL VENOUS BLD VENIPUNCTURE: CPT

## 2020-09-19 PROCEDURE — 87150 DNA/RNA AMPLIFIED PROBE: CPT

## 2020-09-19 PROCEDURE — 700111 HCHG RX REV CODE 636 W/ 250 OVERRIDE (IP): Performed by: EMERGENCY MEDICINE

## 2020-09-19 PROCEDURE — 87040 BLOOD CULTURE FOR BACTERIA: CPT

## 2020-09-19 PROCEDURE — 83690 ASSAY OF LIPASE: CPT

## 2020-09-19 PROCEDURE — 770022 HCHG ROOM/CARE - ICU (200)

## 2020-09-19 PROCEDURE — 82962 GLUCOSE BLOOD TEST: CPT | Mod: 91

## 2020-09-19 PROCEDURE — 96365 THER/PROPH/DIAG IV INF INIT: CPT

## 2020-09-19 PROCEDURE — 700105 HCHG RX REV CODE 258: Performed by: INTERNAL MEDICINE

## 2020-09-19 PROCEDURE — 96368 THER/DIAG CONCURRENT INF: CPT

## 2020-09-19 PROCEDURE — 700105 HCHG RX REV CODE 258: Performed by: EMERGENCY MEDICINE

## 2020-09-19 PROCEDURE — 700102 HCHG RX REV CODE 250 W/ 637 OVERRIDE(OP): Performed by: EMERGENCY MEDICINE

## 2020-09-19 RX ORDER — SODIUM CHLORIDE, SODIUM LACTATE, POTASSIUM CHLORIDE, CALCIUM CHLORIDE 600; 310; 30; 20 MG/100ML; MG/100ML; MG/100ML; MG/100ML
1000 INJECTION, SOLUTION INTRAVENOUS ONCE
Status: COMPLETED | OUTPATIENT
Start: 2020-09-19 | End: 2020-09-19

## 2020-09-19 RX ORDER — ACETAMINOPHEN 325 MG/1
650 TABLET ORAL EVERY 6 HOURS PRN
Status: DISCONTINUED | OUTPATIENT
Start: 2020-09-19 | End: 2020-09-25 | Stop reason: HOSPADM

## 2020-09-19 RX ORDER — ENALAPRILAT 1.25 MG/ML
1.25 INJECTION INTRAVENOUS EVERY 6 HOURS PRN
Status: DISCONTINUED | OUTPATIENT
Start: 2020-09-19 | End: 2020-09-25 | Stop reason: HOSPADM

## 2020-09-19 RX ORDER — SODIUM CHLORIDE, SODIUM LACTATE, POTASSIUM CHLORIDE, CALCIUM CHLORIDE 600; 310; 30; 20 MG/100ML; MG/100ML; MG/100ML; MG/100ML
3000 INJECTION, SOLUTION INTRAVENOUS ONCE
Status: COMPLETED | OUTPATIENT
Start: 2020-09-19 | End: 2020-09-20

## 2020-09-19 RX ORDER — LISINOPRIL 20 MG/1
40 TABLET ORAL DAILY
Status: DISCONTINUED | OUTPATIENT
Start: 2020-09-20 | End: 2020-09-20

## 2020-09-19 RX ORDER — AMOXICILLIN 250 MG
2 CAPSULE ORAL 2 TIMES DAILY
Status: DISCONTINUED | OUTPATIENT
Start: 2020-09-20 | End: 2020-09-25 | Stop reason: HOSPADM

## 2020-09-19 RX ORDER — ONDANSETRON 4 MG/1
4 TABLET, ORALLY DISINTEGRATING ORAL EVERY 4 HOURS PRN
Status: DISCONTINUED | OUTPATIENT
Start: 2020-09-19 | End: 2020-09-25 | Stop reason: HOSPADM

## 2020-09-19 RX ORDER — ATORVASTATIN CALCIUM 20 MG/1
20 TABLET, FILM COATED ORAL DAILY
Status: DISCONTINUED | OUTPATIENT
Start: 2020-09-20 | End: 2020-09-25 | Stop reason: HOSPADM

## 2020-09-19 RX ORDER — POTASSIUM CHLORIDE 7.45 MG/ML
10 INJECTION INTRAVENOUS ONCE
Status: COMPLETED | OUTPATIENT
Start: 2020-09-19 | End: 2020-09-19

## 2020-09-19 RX ORDER — DEXTROSE MONOHYDRATE 25 G/50ML
25-50 INJECTION, SOLUTION INTRAVENOUS PRN
Status: DISCONTINUED | OUTPATIENT
Start: 2020-09-19 | End: 2020-09-25 | Stop reason: HOSPADM

## 2020-09-19 RX ORDER — SODIUM CHLORIDE, SODIUM LACTATE, POTASSIUM CHLORIDE, CALCIUM CHLORIDE 600; 310; 30; 20 MG/100ML; MG/100ML; MG/100ML; MG/100ML
1000 INJECTION, SOLUTION INTRAVENOUS ONCE
Status: COMPLETED | OUTPATIENT
Start: 2020-09-20 | End: 2020-09-20

## 2020-09-19 RX ORDER — AMLODIPINE BESYLATE 10 MG/1
10 TABLET ORAL
Status: DISCONTINUED | OUTPATIENT
Start: 2020-09-20 | End: 2020-09-25 | Stop reason: HOSPADM

## 2020-09-19 RX ORDER — LABETALOL HYDROCHLORIDE 5 MG/ML
10 INJECTION, SOLUTION INTRAVENOUS EVERY 4 HOURS PRN
Status: DISCONTINUED | OUTPATIENT
Start: 2020-09-19 | End: 2020-09-25 | Stop reason: HOSPADM

## 2020-09-19 RX ORDER — BISACODYL 10 MG
10 SUPPOSITORY, RECTAL RECTAL
Status: DISCONTINUED | OUTPATIENT
Start: 2020-09-19 | End: 2020-09-25 | Stop reason: HOSPADM

## 2020-09-19 RX ORDER — HEPARIN SODIUM 5000 [USP'U]/ML
5000 INJECTION, SOLUTION INTRAVENOUS; SUBCUTANEOUS EVERY 8 HOURS
Status: DISCONTINUED | OUTPATIENT
Start: 2020-09-19 | End: 2020-09-20

## 2020-09-19 RX ORDER — POLYETHYLENE GLYCOL 3350 17 G/17G
1 POWDER, FOR SOLUTION ORAL
Status: DISCONTINUED | OUTPATIENT
Start: 2020-09-19 | End: 2020-09-25 | Stop reason: HOSPADM

## 2020-09-19 RX ORDER — ONDANSETRON 2 MG/ML
4 INJECTION INTRAMUSCULAR; INTRAVENOUS EVERY 4 HOURS PRN
Status: DISCONTINUED | OUTPATIENT
Start: 2020-09-19 | End: 2020-09-25 | Stop reason: HOSPADM

## 2020-09-19 RX ORDER — SODIUM CHLORIDE, SODIUM LACTATE, POTASSIUM CHLORIDE, CALCIUM CHLORIDE 600; 310; 30; 20 MG/100ML; MG/100ML; MG/100ML; MG/100ML
INJECTION, SOLUTION INTRAVENOUS CONTINUOUS
Status: DISCONTINUED | OUTPATIENT
Start: 2020-09-19 | End: 2020-09-20

## 2020-09-19 RX ADMIN — SODIUM CHLORIDE, POTASSIUM CHLORIDE, SODIUM LACTATE AND CALCIUM CHLORIDE 3000 ML: 600; 310; 30; 20 INJECTION, SOLUTION INTRAVENOUS at 20:57

## 2020-09-19 RX ADMIN — LABETALOL HYDROCHLORIDE 10 MG: 5 INJECTION, SOLUTION INTRAVENOUS at 20:41

## 2020-09-19 RX ADMIN — SODIUM CHLORIDE, POTASSIUM CHLORIDE, SODIUM LACTATE AND CALCIUM CHLORIDE: 600; 310; 30; 20 INJECTION, SOLUTION INTRAVENOUS at 22:38

## 2020-09-19 RX ADMIN — SODIUM CHLORIDE, POTASSIUM CHLORIDE, SODIUM LACTATE AND CALCIUM CHLORIDE 1000 ML: 600; 310; 30; 20 INJECTION, SOLUTION INTRAVENOUS at 19:47

## 2020-09-19 RX ADMIN — SODIUM CHLORIDE, POTASSIUM CHLORIDE, SODIUM LACTATE AND CALCIUM CHLORIDE 1000 ML: 600; 310; 30; 20 INJECTION, SOLUTION INTRAVENOUS at 19:15

## 2020-09-19 RX ADMIN — POTASSIUM CHLORIDE 10 MEQ: 10 INJECTION, SOLUTION INTRAVENOUS at 20:15

## 2020-09-19 RX ADMIN — SODIUM CHLORIDE 4 UNITS/HR: 9 INJECTION, SOLUTION INTRAVENOUS at 20:13

## 2020-09-19 ASSESSMENT — ENCOUNTER SYMPTOMS
DIAPHORESIS: 0
FEVER: 0
HEMOPTYSIS: 0
COUGH: 0
NERVOUS/ANXIOUS: 0
BLOOD IN STOOL: 0
DOUBLE VISION: 0
PALPITATIONS: 0
CHILLS: 0
SINUS PAIN: 0
BLURRED VISION: 0
NECK PAIN: 0
WEIGHT LOSS: 0
POLYDIPSIA: 0
HEADACHES: 0
WHEEZING: 0
VOMITING: 0
DIZZINESS: 0
DEPRESSION: 0
SPUTUM PRODUCTION: 0
FOCAL WEAKNESS: 0
PHOTOPHOBIA: 0
MYALGIAS: 0
SHORTNESS OF BREATH: 0
BRUISES/BLEEDS EASILY: 0
SPEECH CHANGE: 0
BACK PAIN: 0
HEARTBURN: 0
STRIDOR: 0
SENSORY CHANGE: 0
TINGLING: 0

## 2020-09-19 ASSESSMENT — FIBROSIS 4 INDEX
FIB4 SCORE: 0.88
FIB4 SCORE: .8695652173913043478

## 2020-09-20 PROBLEM — I16.0 HYPERTENSIVE URGENCY: Status: ACTIVE | Noted: 2020-09-20

## 2020-09-20 PROBLEM — N17.9 AKI (ACUTE KIDNEY INJURY) (HCC): Status: ACTIVE | Noted: 2020-09-20

## 2020-09-20 LAB
ANION GAP SERPL CALC-SCNC: 14 MMOL/L (ref 7–16)
ANION GAP SERPL CALC-SCNC: 18 MMOL/L (ref 7–16)
BUN SERPL-MCNC: 24 MG/DL (ref 8–22)
BUN SERPL-MCNC: 27 MG/DL (ref 8–22)
CALCIUM SERPL-MCNC: 9.5 MG/DL (ref 8.5–10.5)
CALCIUM SERPL-MCNC: 9.9 MG/DL (ref 8.5–10.5)
CHLORIDE SERPL-SCNC: 102 MMOL/L (ref 96–112)
CHLORIDE SERPL-SCNC: 108 MMOL/L (ref 96–112)
CO2 SERPL-SCNC: 16 MMOL/L (ref 20–33)
CO2 SERPL-SCNC: 20 MMOL/L (ref 20–33)
CREAT SERPL-MCNC: 2.02 MG/DL (ref 0.5–1.4)
CREAT SERPL-MCNC: 2.17 MG/DL (ref 0.5–1.4)
ERYTHROCYTE [DISTWIDTH] IN BLOOD BY AUTOMATED COUNT: 40 FL (ref 35.9–50)
GLUCOSE BLD-MCNC: 150 MG/DL (ref 65–99)
GLUCOSE BLD-MCNC: 162 MG/DL (ref 65–99)
GLUCOSE BLD-MCNC: 172 MG/DL (ref 65–99)
GLUCOSE BLD-MCNC: 183 MG/DL (ref 65–99)
GLUCOSE BLD-MCNC: 210 MG/DL (ref 65–99)
GLUCOSE BLD-MCNC: 271 MG/DL (ref 65–99)
GLUCOSE BLD-MCNC: 273 MG/DL (ref 65–99)
GLUCOSE BLD-MCNC: 321 MG/DL (ref 65–99)
GLUCOSE BLD-MCNC: 323 MG/DL (ref 65–99)
GLUCOSE BLD-MCNC: 389 MG/DL (ref 65–99)
GLUCOSE BLD-MCNC: 455 MG/DL (ref 65–99)
GLUCOSE SERPL-MCNC: 246 MG/DL (ref 65–99)
GLUCOSE SERPL-MCNC: 499 MG/DL (ref 65–99)
HCT VFR BLD AUTO: 31 % (ref 42–52)
HGB BLD-MCNC: 10.3 G/DL (ref 14–18)
LACTATE BLD-SCNC: 2.1 MMOL/L (ref 0.5–2)
LACTATE BLD-SCNC: 3.7 MMOL/L (ref 0.5–2)
MAGNESIUM SERPL-MCNC: 1.6 MG/DL (ref 1.5–2.5)
MAGNESIUM SERPL-MCNC: 1.6 MG/DL (ref 1.5–2.5)
MCH RBC QN AUTO: 27.5 PG (ref 27–33)
MCHC RBC AUTO-ENTMCNC: 33.2 G/DL (ref 33.7–35.3)
MCV RBC AUTO: 82.9 FL (ref 81.4–97.8)
PLATELET # BLD AUTO: 299 K/UL (ref 164–446)
PMV BLD AUTO: 9.8 FL (ref 9–12.9)
POTASSIUM SERPL-SCNC: 3.2 MMOL/L (ref 3.6–5.5)
POTASSIUM SERPL-SCNC: 3.6 MMOL/L (ref 3.6–5.5)
RBC # BLD AUTO: 3.74 M/UL (ref 4.7–6.1)
SODIUM SERPL-SCNC: 136 MMOL/L (ref 135–145)
SODIUM SERPL-SCNC: 142 MMOL/L (ref 135–145)
WBC # BLD AUTO: 12.3 K/UL (ref 4.8–10.8)

## 2020-09-20 PROCEDURE — 700105 HCHG RX REV CODE 258: Performed by: INTERNAL MEDICINE

## 2020-09-20 PROCEDURE — 700111 HCHG RX REV CODE 636 W/ 250 OVERRIDE (IP): Performed by: INTERNAL MEDICINE

## 2020-09-20 PROCEDURE — 700102 HCHG RX REV CODE 250 W/ 637 OVERRIDE(OP): Performed by: INTERNAL MEDICINE

## 2020-09-20 PROCEDURE — A9270 NON-COVERED ITEM OR SERVICE: HCPCS | Performed by: HOSPITALIST

## 2020-09-20 PROCEDURE — 85027 COMPLETE CBC AUTOMATED: CPT

## 2020-09-20 PROCEDURE — 83605 ASSAY OF LACTIC ACID: CPT

## 2020-09-20 PROCEDURE — 700102 HCHG RX REV CODE 250 W/ 637 OVERRIDE(OP): Performed by: HOSPITALIST

## 2020-09-20 PROCEDURE — 80048 BASIC METABOLIC PNL TOTAL CA: CPT

## 2020-09-20 PROCEDURE — A9270 NON-COVERED ITEM OR SERVICE: HCPCS | Performed by: INTERNAL MEDICINE

## 2020-09-20 PROCEDURE — 700101 HCHG RX REV CODE 250: Performed by: HOSPITALIST

## 2020-09-20 PROCEDURE — 770001 HCHG ROOM/CARE - MED/SURG/GYN PRIV*

## 2020-09-20 PROCEDURE — 99222 1ST HOSP IP/OBS MODERATE 55: CPT | Performed by: HOSPITALIST

## 2020-09-20 PROCEDURE — 83735 ASSAY OF MAGNESIUM: CPT | Mod: 91

## 2020-09-20 PROCEDURE — 82962 GLUCOSE BLOOD TEST: CPT

## 2020-09-20 RX ORDER — POTASSIUM CHLORIDE 7.45 MG/ML
10 INJECTION INTRAVENOUS
Status: COMPLETED | OUTPATIENT
Start: 2020-09-20 | End: 2020-09-20

## 2020-09-20 RX ORDER — ALPRAZOLAM 0.25 MG/1
0.25 TABLET ORAL 3 TIMES DAILY PRN
Status: DISCONTINUED | OUTPATIENT
Start: 2020-09-20 | End: 2020-09-20

## 2020-09-20 RX ORDER — SODIUM CHLORIDE AND POTASSIUM CHLORIDE 150; 900 MG/100ML; MG/100ML
INJECTION, SOLUTION INTRAVENOUS CONTINUOUS
Status: DISCONTINUED | OUTPATIENT
Start: 2020-09-20 | End: 2020-09-23

## 2020-09-20 RX ORDER — MAGNESIUM SULFATE HEPTAHYDRATE 40 MG/ML
4 INJECTION, SOLUTION INTRAVENOUS ONCE
Status: COMPLETED | OUTPATIENT
Start: 2020-09-20 | End: 2020-09-20

## 2020-09-20 RX ORDER — SODIUM CHLORIDE 9 MG/ML
INJECTION, SOLUTION INTRAVENOUS
Status: ACTIVE
Start: 2020-09-20 | End: 2020-09-20

## 2020-09-20 RX ORDER — DEXTROSE MONOHYDRATE 25 G/50ML
50 INJECTION, SOLUTION INTRAVENOUS
Status: DISCONTINUED | OUTPATIENT
Start: 2020-09-20 | End: 2020-09-25 | Stop reason: HOSPADM

## 2020-09-20 RX ORDER — LISINOPRIL 20 MG/1
40 TABLET ORAL DAILY
Status: DISCONTINUED | OUTPATIENT
Start: 2020-09-20 | End: 2020-09-25 | Stop reason: HOSPADM

## 2020-09-20 RX ORDER — INSULIN GLARGINE 100 [IU]/ML
17 INJECTION, SOLUTION SUBCUTANEOUS
Status: DISCONTINUED | OUTPATIENT
Start: 2020-09-20 | End: 2020-09-21

## 2020-09-20 RX ORDER — HYDRALAZINE HYDROCHLORIDE 10 MG/1
5 TABLET, FILM COATED ORAL EVERY 6 HOURS PRN
Status: DISCONTINUED | OUTPATIENT
Start: 2020-09-20 | End: 2020-09-25 | Stop reason: HOSPADM

## 2020-09-20 RX ORDER — LORAZEPAM 2 MG/ML
1-2 INJECTION INTRAMUSCULAR EVERY 4 HOURS PRN
Status: DISCONTINUED | OUTPATIENT
Start: 2020-09-20 | End: 2020-09-21

## 2020-09-20 RX ORDER — AMOXICILLIN AND CLAVULANATE POTASSIUM 875; 125 MG/1; MG/1
1 TABLET, FILM COATED ORAL EVERY 12 HOURS
Status: DISCONTINUED | OUTPATIENT
Start: 2020-09-20 | End: 2020-09-21

## 2020-09-20 RX ADMIN — ALPRAZOLAM 0.25 MG: 0.25 TABLET ORAL at 00:50

## 2020-09-20 RX ADMIN — POTASSIUM CHLORIDE 10 MEQ: 7.46 INJECTION, SOLUTION INTRAVENOUS at 11:36

## 2020-09-20 RX ADMIN — AMOXICILLIN AND CLAVULANATE POTASSIUM 1 TABLET: 875; 125 TABLET, FILM COATED ORAL at 22:09

## 2020-09-20 RX ADMIN — DOCUSATE SODIUM 50 MG AND SENNOSIDES 8.6 MG 2 TABLET: 8.6; 5 TABLET, FILM COATED ORAL at 17:39

## 2020-09-20 RX ADMIN — POTASSIUM CHLORIDE 10 MEQ: 7.46 INJECTION, SOLUTION INTRAVENOUS at 01:39

## 2020-09-20 RX ADMIN — POTASSIUM CHLORIDE AND SODIUM CHLORIDE: 900; 150 INJECTION, SOLUTION INTRAVENOUS at 17:40

## 2020-09-20 RX ADMIN — POTASSIUM CHLORIDE 10 MEQ: 7.46 INJECTION, SOLUTION INTRAVENOUS at 08:10

## 2020-09-20 RX ADMIN — POTASSIUM CHLORIDE 10 MEQ: 7.46 INJECTION, SOLUTION INTRAVENOUS at 04:28

## 2020-09-20 RX ADMIN — MAGNESIUM SULFATE IN WATER 4 G: 40 INJECTION, SOLUTION INTRAVENOUS at 10:38

## 2020-09-20 RX ADMIN — SODIUM CHLORIDE, POTASSIUM CHLORIDE, SODIUM LACTATE AND CALCIUM CHLORIDE: 600; 310; 30; 20 INJECTION, SOLUTION INTRAVENOUS at 09:20

## 2020-09-20 RX ADMIN — SODIUM CHLORIDE, POTASSIUM CHLORIDE, SODIUM LACTATE AND CALCIUM CHLORIDE: 600; 310; 30; 20 INJECTION, SOLUTION INTRAVENOUS at 03:44

## 2020-09-20 RX ADMIN — ONDANSETRON 4 MG: 4 TABLET, ORALLY DISINTEGRATING ORAL at 20:39

## 2020-09-20 RX ADMIN — POTASSIUM CHLORIDE 10 MEQ: 7.46 INJECTION, SOLUTION INTRAVENOUS at 09:18

## 2020-09-20 RX ADMIN — HYDRALAZINE HYDROCHLORIDE 5 MG: 10 TABLET, FILM COATED ORAL at 20:39

## 2020-09-20 RX ADMIN — POTASSIUM CHLORIDE 10 MEQ: 7.46 INJECTION, SOLUTION INTRAVENOUS at 03:19

## 2020-09-20 RX ADMIN — ATORVASTATIN CALCIUM 20 MG: 20 TABLET, FILM COATED ORAL at 04:27

## 2020-09-20 RX ADMIN — LORAZEPAM 2 MG: 2 INJECTION INTRAMUSCULAR; INTRAVENOUS at 04:59

## 2020-09-20 RX ADMIN — AMLODIPINE BESYLATE 10 MG: 10 TABLET ORAL at 04:27

## 2020-09-20 RX ADMIN — LISINOPRIL 40 MG: 20 TABLET ORAL at 01:39

## 2020-09-20 RX ADMIN — LABETALOL HYDROCHLORIDE 10 MG: 5 INJECTION, SOLUTION INTRAVENOUS at 22:11

## 2020-09-20 RX ADMIN — SODIUM CHLORIDE, POTASSIUM CHLORIDE, SODIUM LACTATE AND CALCIUM CHLORIDE 1000 ML: 600; 310; 30; 20 INJECTION, SOLUTION INTRAVENOUS at 00:26

## 2020-09-20 RX ADMIN — POTASSIUM CHLORIDE 10 MEQ: 7.46 INJECTION, SOLUTION INTRAVENOUS at 10:32

## 2020-09-20 RX ADMIN — ENOXAPARIN SODIUM 40 MG: 40 INJECTION SUBCUTANEOUS at 17:39

## 2020-09-20 RX ADMIN — HEPARIN SODIUM 5000 UNITS: 5000 INJECTION, SOLUTION INTRAVENOUS; SUBCUTANEOUS at 00:02

## 2020-09-20 RX ADMIN — HEPARIN SODIUM 5000 UNITS: 5000 INJECTION, SOLUTION INTRAVENOUS; SUBCUTANEOUS at 04:27

## 2020-09-20 RX ADMIN — INSULIN GLARGINE 17 UNITS: 100 INJECTION, SOLUTION SUBCUTANEOUS at 10:33

## 2020-09-20 RX ADMIN — DOCUSATE SODIUM 50 MG AND SENNOSIDES 8.6 MG 2 TABLET: 8.6; 5 TABLET, FILM COATED ORAL at 04:27

## 2020-09-20 ASSESSMENT — ENCOUNTER SYMPTOMS
SEIZURES: 0
HEMOPTYSIS: 0
BRUISES/BLEEDS EASILY: 0
NERVOUS/ANXIOUS: 0
MYALGIAS: 0
SORE THROAT: 0
NAUSEA: 1
EYE PAIN: 0
FEVER: 0
SPEECH CHANGE: 0
VOMITING: 0
PALPITATIONS: 0
ABDOMINAL PAIN: 0
HALLUCINATIONS: 0
CHILLS: 0
EYE DISCHARGE: 0
DIZZINESS: 1
SHORTNESS OF BREATH: 0
COUGH: 0
FLANK PAIN: 0
SPUTUM PRODUCTION: 0
DIARRHEA: 0
LOSS OF CONSCIOUSNESS: 0
FOCAL WEAKNESS: 0
BLOOD IN STOOL: 0
STRIDOR: 0
EYE REDNESS: 0

## 2020-09-20 NOTE — ASSESSMENT & PLAN NOTE
Likely reactive severe dehydration, hypertensive urgency   No focal sign of infection at this point   Now resolved.

## 2020-09-20 NOTE — ED NOTES
Pharmacy Medication Reconciliation    Unable to complete Medication Reconciliation at this time. Pt is not able to remember current medications. Pt reports he only takes one or two medications.    [facility] unable to verify current medications with pt home pharmacy due to they are closed.

## 2020-09-20 NOTE — ASSESSMENT & PLAN NOTE
Beta hydroxy 0.2  Hyperglycemic to 712.    Insulin drip started in ED; transitioned to lantus and SSI  Aggressive IVF resuscitation  9.2 prior HgA1c  Home meds: dulaglutide, lantus and metformin

## 2020-09-20 NOTE — ED TRIAGE NOTES
"Louis Orourke presents via ems, wearing a mask, reporting:  Chief Complaint   Patient presents with   • Hyperglycemia   • ALOC   BS >600. Pt was found sitting on sidewalk next to his motorcycle. Pt states \"I don't feel good\". Pt tachypenic, voiding frequently. 1250mL NS given PTA.       "

## 2020-09-20 NOTE — ED NOTES
LINENS CHANGED, PT CLEANED UP. FLUIDS RUNNING. EP TO BEDSIDE TO UPDATE PT THAT WILL BE ADMITTED TO ICU. PT INCONT OF URINE, CONDOM CATH APPLIED WITH EP PERMISSION. BROUGHT UP PT BP, NO NEW ORDERS RECEIVED AT THIS TIME. CALL LIGHT WITHIN REACH. WILL CONTINUE TO MONITOR

## 2020-09-20 NOTE — PROGRESS NOTES
64M h/o DM, HTN who presented with DKA and HTN crisis.  HTN resolved with his prescribed home medications.  Transitioned off DKA insulin gtt this morning with adeqaute BG control.  He is quite hard to understand due to mumbling, but tells me he stopped taking his medicines because he didn't like his PCP so didn't want to go back and thus couldn't get refills.  His HTN crisis and DKA were likely both the results of medication non-compliance.  Stable for floor transfer.

## 2020-09-20 NOTE — ASSESSMENT & PLAN NOTE
Patient still with increased glucose on Accu-Chek in a.m. > 200s.  We will increase patient's glargine from 20 U to 30-year-old and continue to monitor closely, adjust insulin as needed  Continue with sliding scale

## 2020-09-20 NOTE — PROGRESS NOTES
Triage note    64-year-old male with history of diabetes presented with DKA and hypertension crisis, treated with insulin drip and his blood pressure improved with home medications, likely history of noncompliance    Dr. Mahoney will follow the patient and answered all pages.

## 2020-09-20 NOTE — ASSESSMENT & PLAN NOTE
Hypertensive urgency to 200s systolic  Poorly compliant  Continue amlodipine, atorvastatin and lisinopril  Scheduled and PRN for goal <180 mmHg

## 2020-09-20 NOTE — CONSULTS
Hospital Medicine Consultation    Date of Service  9/20/2020    Referring Physician  Caitlin Verdugo M.D.     Consulting Physician  Shawn Mahoney M.D.    Reason for Consultation  Resume care outside ICU     History of Presenting Illness  64 y.o. male with a past medical history of polysubstance use including marijuana, methamphetamine, diabetes, noncompliance admitted 9/19 with diabetic ketoacidosis and hypertensive urgency.  He initially went to the intensive care unit, Lactic acid 6.2.  Anemia with Hb 10.4. Na 129 but glucose 719.    Potassium 2.7.  Cr 2.25, prior WNL.  He was started on aggressive intravenous fluids and intravenous insulin. He was then cleared to to move to the floor on 9/20. Initial labs/imagings show WBC 12.5.  Chest xray clear.     Creatinine improving slowly with rehydration.  Lactic acid trended down from 6.2 on admission down to 3.7, then down to 2.1.  Patient feels weak but is able to move all extremities     Review of Systems  Review of Systems   Constitutional: Positive for malaise/fatigue. Negative for chills and fever.   HENT: Negative for congestion and sore throat.    Eyes: Negative for pain, discharge and redness.   Respiratory: Negative for cough, hemoptysis, sputum production, shortness of breath and stridor.    Cardiovascular: Negative for chest pain, palpitations and leg swelling.   Gastrointestinal: Positive for nausea (Improving). Negative for abdominal pain, blood in stool, diarrhea and vomiting.   Genitourinary: Negative for flank pain, hematuria and urgency.   Musculoskeletal: Negative for myalgias.   Skin: Negative.    Neurological: Positive for dizziness (Improving). Negative for speech change, focal weakness, seizures and loss of consciousness.   Endo/Heme/Allergies: Does not bruise/bleed easily.   Psychiatric/Behavioral: Negative for hallucinations and suicidal ideas. The patient is not nervous/anxious.      Past Medical History   has a past medical history of  Hyperlipidemia, Hypertension, and Type II or unspecified type diabetes mellitus without mention of complication, not stated as uncontrolled.    Surgical History   has a past surgical history that includes irrigation & debridement ortho (Right, 8/12/2018) and toe amputation (Right, 8/12/2018).    Family History  family history includes Diabetes in his mother; Heart Disease in his mother; Lung Disease in his father; Stroke in his mother.    Social History   reports that he has never smoked. He has never used smokeless tobacco. He reports current alcohol use of about 4.2 oz of alcohol per week. He reports current drug use. Drugs: Marijuana and Methamphetamines.    Medications  Prior to Admission Medications   Prescriptions Last Dose Informant Patient Reported? Taking?   ARIPiprazole (ABILIFY) 10 MG Tab   No No   Sig: Take 1 Tab by mouth every day.   Cholecalciferol 4000 units Cap   No No   Sig: Take 1 Capsule by mouth every day.   Dulaglutide 0.75 MG/0.5ML Solution Pen-injector   No No   Sig: Inject 0.75 mg as instructed every Tuesday.   amLODIPine (NORVASC) 5 MG Tab   No No   Sig: TAKE 1 TABLET ORALLY ONCE DAILY   ammonium lactate (LAC-HYDRIN) 12 % Lotion   No No   Sig: Use 1/4 ribbon to each foot daily to decrease dryness and corns/cracks   atorvastatin (LIPITOR) 20 MG Tab   No No   Sig: Take 1 Tab by mouth every day.   fluticasone (FLONASE) 50 MCG/ACT nasal spray   No No   Sig: Spray 1 Spray in nose every day.   insulin glargine (LANTUS) 100 UNIT/ML Solution Pen-injector injection   No No   Sig: Inject 80 Units as instructed every evening.   lisinopril (PRINIVIL, ZESTRIL) 40 MG tablet   No No   Sig: Take 1 Tab by mouth every day.   loratadine (CLARITIN) 10 MG Tab   No No   Sig: Take 1 Tab by mouth every day.   metformin (GLUCOPHAGE) 1000 MG tablet   No No   Sig: Take 1 Tab by mouth 2 times a day, with meals.      Facility-Administered Medications: None     Allergies  No Known Allergies    Physical Exam  Temp:  [36.1  °C (97 °F)-36.6 °C (97.8 °F)] 36.5 °C (97.7 °F)  Pulse:  [] 102  Resp:  [12-42] 23  BP: (130-208)/() 156/97  SpO2:  [81 %-100 %] 97 %    Physical Exam  Constitutional:       General: He is not in acute distress.     Appearance: He is not ill-appearing or diaphoretic.   HENT:      Head: Atraumatic.      Right Ear: External ear normal.      Left Ear: External ear normal.      Nose: No congestion or rhinorrhea.      Mouth/Throat:      Mouth: Mucous membranes are dry.   Eyes:      General: No scleral icterus.        Right eye: No discharge.         Left eye: No discharge.      Pupils: Pupils are equal, round, and reactive to light.   Neck:      Musculoskeletal: Neck supple. No neck rigidity or muscular tenderness.   Cardiovascular:      Rate and Rhythm: Normal rate and regular rhythm.      Heart sounds: No friction rub. No gallop.    Pulmonary:      Effort: No respiratory distress.      Breath sounds: No stridor. No wheezing.   Abdominal:      Palpations: Abdomen is soft.      Tenderness: There is no abdominal tenderness. There is no guarding or rebound.   Musculoskeletal:      Right lower leg: No edema.      Left lower leg: No edema.   Skin:     General: Skin is dry.      Capillary Refill: Capillary refill takes 2 to 3 seconds.      Coloration: Skin is pale. Skin is not jaundiced.   Neurological:      Mental Status: He is alert and oriented to person, place, and time.      Coordination: Coordination normal.      Comments: Generalized weakness, power 5/5 in both upper and lower extremities   Psychiatric:         Mood and Affect: Mood normal.      Comments: Slowed       Fluids  Date 09/20/20 0700 - 09/21/20 0659   Shift 2276-9107 1619-4729 3492-9070 24 Hour Total   INTAKE   I.V. 118.5   118.5   IV Piggyback 295   295   Shift Total 413.5   413.5   OUTPUT   Urine 1200   1200   Shift Total 1200   1200   Weight (kg) 84.9 84.9 84.9 84.9       Laboratory  Recent Labs     09/19/20  1856 09/20/20  0015   WBC 12.1*  12.3*   RBC 3.77* 3.74*   HEMOGLOBIN 10.4* 10.3*   HEMATOCRIT 32.3* 31.0*   MCV 85.7 82.9   MCH 27.6 27.5   MCHC 32.2* 33.2*   RDW 41.8 40.0   PLATELETCT 309 299   MPV 10.2 9.8     Recent Labs     09/19/20  2045 09/20/20  0015 09/20/20  0605   SODIUM 133* 136 142   POTASSIUM 3.8 3.6 3.2*   CHLORIDE 100 102 108   CO2 14* 16* 20   GLUCOSE 720* 499* 246*   BUN 30* 27* 24*   CREATININE 2.40* 2.17* 2.02*   CALCIUM 9.5 9.9 9.5                     Imaging  DX-CHEST-PORTABLE (1 VIEW)   Final Result      No acute cardiopulmonary abnormality.        Assessment/Plan  Hypertensive urgency  Assessment & Plan  Admitted with hypertensive urgency, resolved   Amlodipine and lisinopril with hold parameters.  Labetalol and hydralazine for extreme hypertension    SUKHWINDER (acute kidney injury) (HCC)  Assessment & Plan  Mostly due to dehydration   Intravenous fluids  Avoid nephrotoxins, monitor inputs and outputs  We will consider renal ultrasound if renal function does not completely improve     Anemia, unspecified  Assessment & Plan  No evidence of gross bleeding.  Continue to monitor transfuse for hemoglobin of less than or equal to 7.  Age-appropriate cancer screening has recommended     Leukocytosis  Assessment & Plan  Likely reactive severe dehydration, hypertensive urgency   No focal sign of infection at this point   Continue to montior for signs of infection      Hypokalemia  Assessment & Plan  Replacing, continue to monitor and replace as needed    Uncontrolled diabetes mellitus (HCC)- (present on admission)  Assessment & Plan  With hyperglycemia ~720s  Glycated hemoglobin 9.5% nearly year ago  Long & short acting insulin  Accu-Checks, hypoglycemia protocol     Hypertension- (present on admission)  Assessment & Plan  Admitted with hypertensive urgency  Amlodipine and lisinopril with hold parameters.  Labetalol and hydralazine for extreme hypertension

## 2020-09-20 NOTE — ASSESSMENT & PLAN NOTE
Admitted with hypertensive urgency, resolved   Continue amlodipine and lisinopril with hold parameters.  IV labetalol and hydralazine PRN with parameters.

## 2020-09-20 NOTE — CONSULTS
Critical Care Consultation    Date of consult: 9/19/2020    Referring Physician  Silvio Gates M.D.    Reason for Consultation  Hyperglycemia and metabolic acidosis    History of Presenting Illness  64 y.o. male who presented 9/19/2020 with not feeling well. Poorly controlled diabetic type 2 with last hgaic 9.5.  He does have hx of etoh use, drug use w/ methamphetamines and marijuana.  AG elevated with co2 13 with beta hydroxy negative.  Hx hypertension with sbp over 200s.  Patient poor historian regarding medications hx.  Also hx of dyslipidemia.  WBC 12.5.  Chest xray clear.  Lactic acid 6.2.  Anemia with hb 10.4.  Sodium 129 but glucose 719.  k 2.7.  Cr 2.25, prior normal.  vbg normal ph.  He has a high work of breathing. No hx of heart failure.  I ordered additional 3 L IVF.  Maintenance fluids 200 ml/hr LR.  He has not used any methamphetamines recently.  Limited etoh use, not daily.  Rode motorcycle to hospital and he is functional at baseline. Limited use of home medications. No recent signs of infection, no pneumonia, no urinary symptoms.    Code Status  Full code    Review of Systems  Review of Systems   Constitutional: Positive for malaise/fatigue. Negative for chills, diaphoresis, fever and weight loss.   HENT: Negative for congestion and sinus pain.    Eyes: Negative for blurred vision, double vision and photophobia.   Respiratory: Negative for cough, hemoptysis, sputum production, shortness of breath, wheezing and stridor.    Cardiovascular: Negative for chest pain, palpitations and leg swelling.   Gastrointestinal: Negative for blood in stool, heartburn, melena and vomiting.   Genitourinary: Negative for dysuria and urgency.   Musculoskeletal: Negative for back pain, myalgias and neck pain.   Skin: Negative for itching and rash.   Neurological: Negative for dizziness, tingling, sensory change, speech change, focal weakness and headaches.   Endo/Heme/Allergies: Negative for polydipsia. Does not  bruise/bleed easily.   Psychiatric/Behavioral: Negative for depression. The patient is not nervous/anxious.        Past Medical History   has a past medical history of Hyperlipidemia, Hypertension, and Type II or unspecified type diabetes mellitus without mention of complication, not stated as uncontrolled.    Surgical History   has a past surgical history that includes irrigation & debridement ortho (Right, 8/12/2018) and toe amputation (Right, 8/12/2018).    Family History  family history includes Diabetes in his mother; Heart Disease in his mother; Lung Disease in his father; Stroke in his mother.    Social History   reports that he has never smoked. He has never used smokeless tobacco. He reports current alcohol use of about 4.2 oz of alcohol per week. He reports current drug use. Drugs: Marijuana and Methamphetamines.    Medications  Home Medications     Reviewed by Stephanie Stanton (Pharmacy Tech) on 09/19/20 at 2003  Med List Status: Unable to Obtain   Medication Last Dose Status   amLODIPine (NORVASC) 5 MG Tab  Active   ammonium lactate (LAC-HYDRIN) 12 % Lotion  Active   ARIPiprazole (ABILIFY) 10 MG Tab  Active   atorvastatin (LIPITOR) 20 MG Tab  Active   Cholecalciferol 4000 units Cap  Active   Dulaglutide 0.75 MG/0.5ML Solution Pen-injector  Active   fluticasone (FLONASE) 50 MCG/ACT nasal spray  Active   insulin glargine (LANTUS) 100 UNIT/ML Solution Pen-injector injection  Active   lisinopril (PRINIVIL, ZESTRIL) 40 MG tablet  Active   loratadine (CLARITIN) 10 MG Tab  Active   metformin (GLUCOPHAGE) 1000 MG tablet  Active              Current Facility-Administered Medications   Medication Dose Route Frequency Provider Last Rate Last Dose   • potassium chloride (KCL) ivpb 10 mEq  10 mEq Intravenous Once Silvio Gates M.D.       • insulin regular human (HUMULIN/NOVOLIN R) 62.5 Units in  mL Infusion  4 Units/hr Intravenous Continuous Silvio Gates M.D.         Current Outpatient Medications    Medication Sig Dispense Refill   • metformin (GLUCOPHAGE) 1000 MG tablet Take 1 Tab by mouth 2 times a day, with meals. 60 Tab 3   • insulin glargine (LANTUS) 100 UNIT/ML Solution Pen-injector injection Inject 80 Units as instructed every evening. 8 PEN 1   • lisinopril (PRINIVIL, ZESTRIL) 40 MG tablet Take 1 Tab by mouth every day. 30 Tab 5   • atorvastatin (LIPITOR) 20 MG Tab Take 1 Tab by mouth every day. 30 Tab 5   • ARIPiprazole (ABILIFY) 10 MG Tab Take 1 Tab by mouth every day. 30 Tab 2   • amLODIPine (NORVASC) 5 MG Tab TAKE 1 TABLET ORALLY ONCE DAILY 30 Tab 5   • fluticasone (FLONASE) 50 MCG/ACT nasal spray Spray 1 Spray in nose every day. 16 g 11   • Dulaglutide 0.75 MG/0.5ML Solution Pen-injector Inject 0.75 mg as instructed every Tuesday. 4 PEN 1   • Cholecalciferol 4000 units Cap Take 1 Capsule by mouth every day. 30 Cap 2   • loratadine (CLARITIN) 10 MG Tab Take 1 Tab by mouth every day. 30 Tab 2   • ammonium lactate (LAC-HYDRIN) 12 % Lotion Use 1/4 ribbon to each foot daily to decrease dryness and corns/cracks 1 Bottle 2       Allergies  No Known Allergies    Vital Signs last 24 hours  Pulse:  [126-145] 126  Resp:  [22-42] 25  BP: (153-208)/(103-127) 208/127  SpO2:  [98 %-100 %] 99 %    Physical Exam  Physical Exam  Vitals signs and nursing note reviewed.   Constitutional:       General: He is not in acute distress.     Appearance: He is ill-appearing. He is not toxic-appearing or diaphoretic.   HENT:      Head: Normocephalic and atraumatic.      Right Ear: External ear normal.      Left Ear: External ear normal.      Nose: No congestion or rhinorrhea.      Mouth/Throat:      Mouth: Mucous membranes are moist.      Pharynx: Oropharynx is clear. No oropharyngeal exudate or posterior oropharyngeal erythema.   Eyes:      General: No scleral icterus.        Right eye: No discharge.         Left eye: No discharge.      Extraocular Movements: Extraocular movements intact.      Conjunctiva/sclera:  Conjunctivae normal.      Pupils: Pupils are equal, round, and reactive to light.   Neck:      Musculoskeletal: Normal range of motion. No neck rigidity or muscular tenderness.   Cardiovascular:      Rate and Rhythm: Normal rate and regular rhythm.      Pulses: Normal pulses.      Heart sounds: Normal heart sounds. No murmur.   Pulmonary:      Breath sounds: No stridor. No wheezing, rhonchi or rales.   Chest:      Chest wall: No tenderness.   Abdominal:      General: There is no distension.      Palpations: There is no mass.      Tenderness: There is no abdominal tenderness. There is no guarding.   Musculoskeletal:         General: No swelling, tenderness or deformity.      Right lower leg: No edema.      Left lower leg: No edema.   Lymphadenopathy:      Cervical: No cervical adenopathy.   Skin:     Coloration: Skin is not jaundiced or pale.      Findings: No bruising, erythema, lesion or rash.   Neurological:      General: No focal deficit present.      Mental Status: He is alert and oriented to person, place, and time. Mental status is at baseline.      Cranial Nerves: No cranial nerve deficit.      Sensory: No sensory deficit.      Motor: No weakness.      Coordination: Coordination normal.      Gait: Gait normal.   Psychiatric:         Mood and Affect: Mood normal.         Behavior: Behavior normal.         Thought Content: Thought content normal.         Judgment: Judgment normal.         Fluids    Intake/Output Summary (Last 24 hours) at 9/19/2020 2008  Last data filed at 9/19/2020 1931  Gross per 24 hour   Intake 1000 ml   Output --   Net 1000 ml       Laboratory  Recent Results (from the past 48 hour(s))   Lactic acid (lactate)    Collection Time: 09/19/20  6:56 PM   Result Value Ref Range    Lactic Acid 6.2 (HH) 0.5 - 2.0 mmol/L   CBC WITH DIFFERENTIAL    Collection Time: 09/19/20  6:56 PM   Result Value Ref Range    WBC 12.1 (H) 4.8 - 10.8 K/uL    RBC 3.77 (L) 4.70 - 6.10 M/uL    Hemoglobin 10.4 (L) 14.0 -  18.0 g/dL    Hematocrit 32.3 (L) 42.0 - 52.0 %    MCV 85.7 81.4 - 97.8 fL    MCH 27.6 27.0 - 33.0 pg    MCHC 32.2 (L) 33.7 - 35.3 g/dL    RDW 41.8 35.9 - 50.0 fL    Platelet Count 309 164 - 446 K/uL    MPV 10.2 9.0 - 12.9 fL    Neutrophils-Polys 85.10 (H) 44.00 - 72.00 %    Lymphocytes 7.40 (L) 22.00 - 41.00 %    Monocytes 6.00 0.00 - 13.40 %    Eosinophils 0.50 0.00 - 6.90 %    Basophils 0.30 0.00 - 1.80 %    Immature Granulocytes 0.70 0.00 - 0.90 %    Nucleated RBC 0.00 /100 WBC    Neutrophils (Absolute) 10.33 (H) 1.82 - 7.42 K/uL    Lymphs (Absolute) 0.90 (L) 1.00 - 4.80 K/uL    Monos (Absolute) 0.73 0.00 - 0.85 K/uL    Eos (Absolute) 0.06 0.00 - 0.51 K/uL    Baso (Absolute) 0.04 0.00 - 0.12 K/uL    Immature Granulocytes (abs) 0.08 0.00 - 0.11 K/uL    NRBC (Absolute) 0.00 K/uL   COMP METABOLIC PANEL    Collection Time: 09/19/20  6:56 PM   Result Value Ref Range    Sodium 129 (L) 135 - 145 mmol/L    Potassium 3.7 3.6 - 5.5 mmol/L    Chloride 94 (L) 96 - 112 mmol/L    Co2 13 (L) 20 - 33 mmol/L    Anion Gap 22.0 (H) 7.0 - 16.0    Glucose 719 (HH) 65 - 99 mg/dL    Bun 31 (H) 8 - 22 mg/dL    Creatinine 2.25 (H) 0.50 - 1.40 mg/dL    Calcium 9.4 8.5 - 10.5 mg/dL    AST(SGOT) 12 12 - 45 U/L    ALT(SGPT) 9 2 - 50 U/L    Alkaline Phosphatase 128 (H) 30 - 99 U/L    Total Bilirubin 0.2 0.1 - 1.5 mg/dL    Albumin 3.8 3.2 - 4.9 g/dL    Total Protein 6.7 6.0 - 8.2 g/dL    Globulin 2.9 1.9 - 3.5 g/dL    A-G Ratio 1.3 g/dL   LIPASE    Collection Time: 09/19/20  6:56 PM   Result Value Ref Range    Lipase 57 11 - 82 U/L   BETA-HYDROXYBUTYRIC ACID    Collection Time: 09/19/20  6:56 PM   Result Value Ref Range    beta-Hydroxybutyric Acid 0.20 0.02 - 0.27 mmol/L   ESTIMATED GFR    Collection Time: 09/19/20  6:56 PM   Result Value Ref Range    GFR If African American 35 (A) >60 mL/min/1.73 m 2    GFR If Non  29 (A) >60 mL/min/1.73 m 2   URINALYSIS    Collection Time: 09/19/20  7:10 PM    Specimen: Urine   Result Value  Ref Range    Color Yellow     Character Cloudy (A)     Specific Gravity 1.017 <1.035    Ph 5.5 5.0 - 8.0    Glucose >=1000 (A) Negative mg/dL    Ketones Negative Negative mg/dL    Protein Negative Negative mg/dL    Bilirubin Negative Negative    Urobilinogen, Urine 0.2 Negative    Nitrite Negative Negative    Leukocyte Esterase Negative Negative    Occult Blood Negative Negative    Micro Urine Req Microscopic    URINE MICROSCOPIC (W/UA)    Collection Time: 20  7:10 PM   Result Value Ref Range    WBC 2-5 (A) /hpf    RBC 2-5 (A) /hpf    Bacteria Negative None /hpf    Epithelial Cells Few /hpf    Hyaline Cast 0-2 /lpf   EKG    Collection Time: 20  7:27 PM   Result Value Ref Range    Report       [facility] Emergency Dept.    Test Date:  2020  Pt Name:    HOSSEIN PRINCE                 Department: ER  MRN:        [id number]                      Room:       [facility]  Gender:     Male                         Technician: [id number]  :        1955                   Requested By:ZENY MAHER  Order #:    [id number]                    Reading MD:    Measurements  Intervals                                Axis  Rate:       126                          P:          68  DC:         132                          QRS:        -78  QRSD:       110                          T:          38  QT:         332  QTc:        481    Interpretive Statements  SINUS TACHYCARDIA  VENTRICULAR PREMATURE COMPLEX  LEFT ANTERIOR FASCICULAR BLOCK  No previous ECG available for comparison     VENOUS BLOOD GAS    Collection Time: 20  7:28 PM   Result Value Ref Range    Venous Bg Ph 7.40 7.31 - 7.45    Venous Bg Pco2 22.0 (L) 41.0 - 51.0 mmHg    Venous Bg Po2 31.9 25.0 - 40.0 mmHg    Venous Bg O2 Saturation 60.7 %    Venous Bg Hco3 13 (L) 24 - 28 mmol/L    Venous Bg Base Excess -10 mmol/L    Body Temp see below Centigrade   ACCU-CHEK GLUCOSE    Collection Time: 20  7:46 PM   Result Value Ref Range    Glucose -  Accu-Ck >600 (HH) 65 - 99 mg/dL       Imaging  DX-CHEST-PORTABLE (1 VIEW)   Final Result      No acute cardiopulmonary abnormality.          Assessment/Plan  Uncontrolled diabetes mellitus (HCC)- (present on admission)  Assessment & Plan  Beta hydroxy 0.2  Hyperglycemic to 712.    Insulin drip started in ed  Aggressive IVF resuscitation  9.2 prior hgaic  Supposed to be on dulaglutide, lantus and metformin      Hypertension- (present on admission)  Assessment & Plan  Hypertensive urgency  To 200s systolic  Poorly compliant  Supposed to be on amlodipine, atorvastatin and lisinopril  Scheduled and Prn for goal less than 180 overnight    Anemia, unspecified  Assessment & Plan  No signs bleeding      Leukocytosis  Assessment & Plan  Likely volume contraction  No obvious source infection  Blood cx ordered      Discussed patient condition and risk of morbidity and/or mortality with RN, RT, Pharmacy, Code status disscussed, Charge nurse / hot rounds and Patient.      The patient remains critically ill.  Aggressive IVF and insulin drip for poorly controlled diabetes with HONK episode and metabolic acidosis.  Critical care time = 35 minutes in directly providing and coordinating critical care and extensive data review.  No time overlap and excludes procedures.

## 2020-09-20 NOTE — ED PROVIDER NOTES
"ED Provider Note    ER PROVIDER NOTE        CHIEF COMPLAINT  Chief Complaint   Patient presents with   • Hyperglycemia   • ALOC       HPI  Louis Orourke is a 64 y.o. male who presents to the emergency department complaining of fatigue and \"not feeling well\".  Patient is unsure exactly how long he has not felt well he states recent few days, time he notes his \"diabetes\".  He last checked his blood sugar a few weeks ago although has not been taking his medications as directed.  He states no nausea or vomiting, no abdominal pain.  No chest pain or shortness of breath.  No fevers or chills or cough or congestion.  No headaches.     REVIEW OF SYSTEMS  Pertinent positives include weakness. Pertinent negatives include no headache. See HPI for details. All other systems reviewed and are negative.    PAST MEDICAL HISTORY   has a past medical history of Hyperlipidemia, Hypertension, and Type II or unspecified type diabetes mellitus without mention of complication, not stated as uncontrolled.    SURGICAL HISTORY   has a past surgical history that includes irrigation & debridement ortho (Right, 8/12/2018) and toe amputation (Right, 8/12/2018).    FAMILY HISTORY  Family History   Problem Relation Age of Onset   • Stroke Mother    • Heart Disease Mother    • Diabetes Mother    • Lung Disease Father        SOCIAL HISTORY  Social History     Socioeconomic History   • Marital status: Single     Spouse name: Not on file   • Number of children: Not on file   • Years of education: Not on file   • Highest education level: Not on file   Occupational History   • Not on file   Social Needs   • Financial resource strain: Not on file   • Food insecurity     Worry: Not on file     Inability: Not on file   • Transportation needs     Medical: Not on file     Non-medical: Not on file   Tobacco Use   • Smoking status: Never Smoker   • Smokeless tobacco: Never Used   Substance and Sexual Activity   • Alcohol use: Yes     Alcohol/week: 4.2 oz " "    Types: 7 Cans of beer per week   • Drug use: Yes     Types: Marijuana, Methamphetamines     Comment: Once in a while Crank about once a month, years ago LSD   • Sexual activity: Not Currently   Lifestyle   • Physical activity     Days per week: Not on file     Minutes per session: Not on file   • Stress: Not on file   Relationships   • Social connections     Talks on phone: Not on file     Gets together: Not on file     Attends Buddhist service: Not on file     Active member of club or organization: Not on file     Attends meetings of clubs or organizations: Not on file     Relationship status: Not on file   • Intimate partner violence     Fear of current or ex partner: Not on file     Emotionally abused: Not on file     Physically abused: Not on file     Forced sexual activity: Not on file   Other Topics Concern   • Not on file   Social History Narrative   • Not on file      Social History     Substance and Sexual Activity   Drug Use Yes   • Types: Marijuana, Methamphetamines    Comment: Once in a while Crank about once a month, years ago LSD       CURRENT MEDICATIONS  Home Medications     Reviewed by Stephanie Stanton (Pharmacy Tech) on 09/19/20 at 2003  Med List Status: Unable to Obtain   Medication Last Dose Status   amLODIPine (NORVASC) 5 MG Tab  Active   ammonium lactate (LAC-HYDRIN) 12 % Lotion  Active   ARIPiprazole (ABILIFY) 10 MG Tab  Active   atorvastatin (LIPITOR) 20 MG Tab  Active   Cholecalciferol 4000 units Cap  Active   Dulaglutide 0.75 MG/0.5ML Solution Pen-injector  Active   fluticasone (FLONASE) 50 MCG/ACT nasal spray  Active   insulin glargine (LANTUS) 100 UNIT/ML Solution Pen-injector injection  Active   lisinopril (PRINIVIL, ZESTRIL) 40 MG tablet  Active   loratadine (CLARITIN) 10 MG Tab  Active   metformin (GLUCOPHAGE) 1000 MG tablet  Active                ALLERGIES  No Known Allergies    PHYSICAL EXAM  VITAL SIGNS: BP (!) 195/111   Pulse (!) 101   Resp 20   Ht 1.778 m (5' 10\")   Wt " 91.6 kg (202 lb)   SpO2 100%   BMI 28.98 kg/m²   Pulse ox interpretation: I interpret this pulse ox as normal.    Constitutional: Alert, ill-appearing  HENT: No signs of trauma, Bilateral external ears normal, Nose normal.  Mucous membranes dry  Eyes: Pupils are equal and reactive, Conjunctiva normal, Non-icteric.   Neck: Normal range of motion, No tenderness, Supple, No stridor.   Lymphatic: No lymphadenopathy noted.   Cardiovascular: Tachycardic, regular, no murmurs.   Thorax & Lungs: Normal breath sounds, No respiratory distress, No wheezing, No chest tenderness.  Tachypneic  Abdomen: Bowel sounds normal, Soft, No tenderness, No masses, No pulsatile masses. No peritoneal signs.  Skin: Warm, Dry, No erythema, No rash.   Back: No bony tenderness, No CVA tenderness.   Extremities: Intact distal pulses, No edema, No tenderness, No cyanosis,   Musculoskeletal: Good range of motion in all major joints. No tenderness to palpation or major deformities noted.   Neurologic: Alert patient knows his name and the place although not date, moves all 4 extremities well to command  Psychiatric: Anxious appearing    DIAGNOSTIC STUDIES / PROCEDURES    EKG  Results for orders placed or performed during the hospital encounter of 09/19/20   Lactic acid (lactate)   Result Value Ref Range    Lactic Acid 6.2 (HH) 0.5 - 2.0 mmol/L   Lactic acid (lactate): Repeat if initial lactic acid result is greater than 2   Result Value Ref Range    Lactic Acid 5.0 (HH) 0.5 - 2.0 mmol/L   CBC WITH DIFFERENTIAL   Result Value Ref Range    WBC 12.1 (H) 4.8 - 10.8 K/uL    RBC 3.77 (L) 4.70 - 6.10 M/uL    Hemoglobin 10.4 (L) 14.0 - 18.0 g/dL    Hematocrit 32.3 (L) 42.0 - 52.0 %    MCV 85.7 81.4 - 97.8 fL    MCH 27.6 27.0 - 33.0 pg    MCHC 32.2 (L) 33.7 - 35.3 g/dL    RDW 41.8 35.9 - 50.0 fL    Platelet Count 309 164 - 446 K/uL    MPV 10.2 9.0 - 12.9 fL    Neutrophils-Polys 85.10 (H) 44.00 - 72.00 %    Lymphocytes 7.40 (L) 22.00 - 41.00 %    Monocytes  6.00 0.00 - 13.40 %    Eosinophils 0.50 0.00 - 6.90 %    Basophils 0.30 0.00 - 1.80 %    Immature Granulocytes 0.70 0.00 - 0.90 %    Nucleated RBC 0.00 /100 WBC    Neutrophils (Absolute) 10.33 (H) 1.82 - 7.42 K/uL    Lymphs (Absolute) 0.90 (L) 1.00 - 4.80 K/uL    Monos (Absolute) 0.73 0.00 - 0.85 K/uL    Eos (Absolute) 0.06 0.00 - 0.51 K/uL    Baso (Absolute) 0.04 0.00 - 0.12 K/uL    Immature Granulocytes (abs) 0.08 0.00 - 0.11 K/uL    NRBC (Absolute) 0.00 K/uL   COMP METABOLIC PANEL   Result Value Ref Range    Sodium 129 (L) 135 - 145 mmol/L    Potassium 3.7 3.6 - 5.5 mmol/L    Chloride 94 (L) 96 - 112 mmol/L    Co2 13 (L) 20 - 33 mmol/L    Anion Gap 22.0 (H) 7.0 - 16.0    Glucose 719 (HH) 65 - 99 mg/dL    Bun 31 (H) 8 - 22 mg/dL    Creatinine 2.25 (H) 0.50 - 1.40 mg/dL    Calcium 9.4 8.5 - 10.5 mg/dL    AST(SGOT) 12 12 - 45 U/L    ALT(SGPT) 9 2 - 50 U/L    Alkaline Phosphatase 128 (H) 30 - 99 U/L    Total Bilirubin 0.2 0.1 - 1.5 mg/dL    Albumin 3.8 3.2 - 4.9 g/dL    Total Protein 6.7 6.0 - 8.2 g/dL    Globulin 2.9 1.9 - 3.5 g/dL    A-G Ratio 1.3 g/dL   URINALYSIS    Specimen: Urine   Result Value Ref Range    Color Yellow     Character Cloudy (A)     Specific Gravity 1.017 <1.035    Ph 5.5 5.0 - 8.0    Glucose >=1000 (A) Negative mg/dL    Ketones Negative Negative mg/dL    Protein Negative Negative mg/dL    Bilirubin Negative Negative    Urobilinogen, Urine 0.2 Negative    Nitrite Negative Negative    Leukocyte Esterase Negative Negative    Occult Blood Negative Negative    Micro Urine Req Microscopic    VENOUS BLOOD GAS   Result Value Ref Range    Venous Bg Ph 7.40 7.31 - 7.45    Venous Bg Pco2 22.0 (L) 41.0 - 51.0 mmHg    Venous Bg Po2 31.9 25.0 - 40.0 mmHg    Venous Bg O2 Saturation 60.7 %    Venous Bg Hco3 13 (L) 24 - 28 mmol/L    Venous Bg Base Excess -10 mmol/L    Body Temp see below Centigrade   LIPASE   Result Value Ref Range    Lipase 57 11 - 82 U/L   BETA-HYDROXYBUTYRIC ACID   Result Value Ref Range     beta-Hydroxybutyric Acid 0.20 0.02 - 0.27 mmol/L   URINE MICROSCOPIC (W/UA)   Result Value Ref Range    WBC 2-5 (A) /hpf    RBC 2-5 (A) /hpf    Bacteria Negative None /hpf    Epithelial Cells Few /hpf    Hyaline Cast 0-2 /lpf   ESTIMATED GFR   Result Value Ref Range    GFR If African American 35 (A) >60 mL/min/1.73 m 2    GFR If Non  29 (A) >60 mL/min/1.73 m 2   Routine (COVID/SARS COV-2 In-House PCR up to 24 hours)    Specimen: Nasopharyngeal; Respirate   Result Value Ref Range    COVID Order Status Received    SARS-CoV-2, PCR (In-House)   Result Value Ref Range    SARS-CoV-2 Source NP Swab     SARS-CoV-2 by PCR NotDetected    ACCU-CHEK GLUCOSE   Result Value Ref Range    Glucose - Accu-Ck >600 () 65 - 99 mg/dL   EKG   Result Value Ref Range    Report       [facility] Emergency Dept.    Test Date:  2020  Pt Name:    HOSSEIN PRINCE                 Department: ER  MRN:        [id number]                      Room:       [facility]  Gender:     Male                         Technician: [id number]  :        [id number]                   Requested By:ZENY MAHER  Order #:    [id number]                    Reading MD: ZENY MAHER MD    Measurements  Intervals                                Axis  Rate:       126                          P:          68  AK:         132                          QRS:        -78  QRSD:       110                          T:          38  QT:         332  QTc:        481    Interpretive Statements  SINUS TACHYCARDIA  VENTRICULAR PREMATURE COMPLEX  LEFT ANTERIOR FASCICULAR BLOCK  No previous ECG available for comparison  Electronically Signed On 2020 22:15:43 PDT by ZENY MAHER MD         RADIOLOGY  DX-CHEST-PORTABLE (1 VIEW)   Final Result      No acute cardiopulmonary abnormality.        The radiologist's interpretation of all radiological studies have been reviewed and images independently viewed by me.    COURSE & MEDICAL DECISION MAKING  Nursing  notes, VS, PMSFHx reviewed in chart.    6:49 PM Patient seen and examined at bedside. Patient will be treated with IV fluid. Ordered for CBC, CMP, lipase, VBG, beta hydroxybutyrate, ECG, x-ray, urinalysis to evaluate his symptoms.  At this point, his exam and symptoms to suggest DKA, will start IV fluids, pending metabolic panel for insulin initiation    7:40 PM  Metabolic panel has returned, potassium 3.7, glucose greater than 700, patient on fluids as well as insulin drip and 10 mEq of potassium ordered  Reevaluation, patient still confused heart rate is improving    8:06 PM  Discussed case with Dr. Iqbal for admission to the ICU      HYDRATION: Based on the patient's presentation of Hyperglycemia the patient was given IV fluids. IV Hydration was used because oral hydration was not as rapid as required. Upon recheck following hydration, the patient was Improved.     This patient was cared for during the COVID-19 pandemic.  History and physical exam may be limited/truncated by the inherent challenges of PPE and the need to decrease staff exposure to novel coronavirus.  Some aspects of disease management may be different to protect staff and help slow the spread of disease. I verified that, if possible, the patient was wearing a mask and I was wearing appropriate PPE every time I encountered the patient.     Current [facility] COVID19 protocol followed      The total critical care time spent on this patient was 40 minutes, resuscitating patient, speaking with admitting physician, and interpreting test results. This 40 minutes is exclusive of separately billable procedures.      Decision Making:  This is a 64 y.o. male presenting with hyperglycemia and some confusion.  Patient does appear to be in a nonketotic hyperosmolar state.  He has been started on aggressive IV fluids, as well as insulin and will be admitted to the ICU.  His potassium was 3.7 so he was given 10 mEq of this as well.  Patient has had no fevers or  infectious symptoms, has a negative chest x-ray and urinalysis while he does have a lactic acidosis his presentation is not suggestive of infectious etiology for symptoms.  Likely this is due to medication noncompliance.  Patient does have some renal dysfunction as well likely from his significant dehydration      Patient is admitted in critical condition    FINAL IMPRESSION  1. Dehydration    2. Acute kidney injury (HCC)    3. Diabetic hyperosmolar non-ketotic state (HCC)    4. Lactic acidosis          The note accurately reflects work and decisions made by me.  Silvio Gates M.D.  9/19/2020  10:17 PM

## 2020-09-20 NOTE — ASSESSMENT & PLAN NOTE
Creatinine still not improving, however good urine output last 24 hours.  We will continue to monitor and avoid nephrotoxins, monitor inputs and outputs

## 2020-09-20 NOTE — ASSESSMENT & PLAN NOTE
Admitted with hypertensive urgency, which has resolved  Continue amlodipine and lisinopril with hold parameters.  PRN with hydralazine and IV labetalol with parameters.

## 2020-09-20 NOTE — ASSESSMENT & PLAN NOTE
No evidence of gross bleeding.  Continue to monitor transfuse for hemoglobin of less than or equal to 7.  Age-appropriate cancer screening has recommended   TSH and B12 are within normal limits per  Ordered iron panel.

## 2020-09-21 ENCOUNTER — APPOINTMENT (OUTPATIENT)
Dept: RADIOLOGY | Facility: MEDICAL CENTER | Age: 65
DRG: 638 | End: 2020-09-21
Attending: HOSPITALIST
Payer: COMMERCIAL

## 2020-09-21 PROBLEM — N13.9 ACUTE URINARY OBSTRUCTION: Status: ACTIVE | Noted: 2020-09-21

## 2020-09-21 PROBLEM — F10.939 ALCOHOL WITHDRAWAL (HCC): Status: ACTIVE | Noted: 2020-09-21

## 2020-09-21 LAB
ANION GAP SERPL CALC-SCNC: 14 MMOL/L (ref 7–16)
BACTERIA BLD CULT: ABNORMAL
BACTERIA BLD CULT: ABNORMAL
BACTERIA UR CULT: NORMAL
BASOPHILS # BLD AUTO: 0.3 % (ref 0–1.8)
BASOPHILS # BLD: 0.03 K/UL (ref 0–0.12)
BUN SERPL-MCNC: 19 MG/DL (ref 8–22)
CALCIUM SERPL-MCNC: 10 MG/DL (ref 8.5–10.5)
CHLORIDE SERPL-SCNC: 105 MMOL/L (ref 96–112)
CO2 SERPL-SCNC: 20 MMOL/L (ref 20–33)
CREAT SERPL-MCNC: 2.25 MG/DL (ref 0.5–1.4)
EOSINOPHIL # BLD AUTO: 0.22 K/UL (ref 0–0.51)
EOSINOPHIL NFR BLD: 2.1 % (ref 0–6.9)
ERYTHROCYTE [DISTWIDTH] IN BLOOD BY AUTOMATED COUNT: 42.5 FL (ref 35.9–50)
GLUCOSE BLD-MCNC: 138 MG/DL (ref 65–99)
GLUCOSE BLD-MCNC: 218 MG/DL (ref 65–99)
GLUCOSE BLD-MCNC: 261 MG/DL (ref 65–99)
GLUCOSE BLD-MCNC: 279 MG/DL (ref 65–99)
GLUCOSE BLD-MCNC: 292 MG/DL (ref 65–99)
GLUCOSE SERPL-MCNC: 196 MG/DL (ref 65–99)
HCT VFR BLD AUTO: 32.9 % (ref 42–52)
HGB BLD-MCNC: 10.7 G/DL (ref 14–18)
IMM GRANULOCYTES # BLD AUTO: 0.05 K/UL (ref 0–0.11)
IMM GRANULOCYTES NFR BLD AUTO: 0.5 % (ref 0–0.9)
LACTATE BLD-SCNC: 2 MMOL/L (ref 0.5–2)
LYMPHOCYTES # BLD AUTO: 1.2 K/UL (ref 1–4.8)
LYMPHOCYTES NFR BLD: 11.6 % (ref 22–41)
MCH RBC QN AUTO: 27.6 PG (ref 27–33)
MCHC RBC AUTO-ENTMCNC: 32.5 G/DL (ref 33.7–35.3)
MCV RBC AUTO: 84.8 FL (ref 81.4–97.8)
MONOCYTES # BLD AUTO: 0.73 K/UL (ref 0–0.85)
MONOCYTES NFR BLD AUTO: 7.1 % (ref 0–13.4)
NEUTROPHILS # BLD AUTO: 8.12 K/UL (ref 1.82–7.42)
NEUTROPHILS NFR BLD: 78.4 % (ref 44–72)
NRBC # BLD AUTO: 0 K/UL
NRBC BLD-RTO: 0 /100 WBC
PLATELET # BLD AUTO: 317 K/UL (ref 164–446)
PMV BLD AUTO: 9.7 FL (ref 9–12.9)
POTASSIUM SERPL-SCNC: 3.6 MMOL/L (ref 3.6–5.5)
RBC # BLD AUTO: 3.88 M/UL (ref 4.7–6.1)
SIGNIFICANT IND 70042: ABNORMAL
SIGNIFICANT IND 70042: NORMAL
SITE SITE: ABNORMAL
SITE SITE: NORMAL
SODIUM SERPL-SCNC: 139 MMOL/L (ref 135–145)
SOURCE SOURCE: ABNORMAL
SOURCE SOURCE: NORMAL
WBC # BLD AUTO: 10.4 K/UL (ref 4.8–10.8)

## 2020-09-21 PROCEDURE — 700111 HCHG RX REV CODE 636 W/ 250 OVERRIDE (IP): Performed by: INTERNAL MEDICINE

## 2020-09-21 PROCEDURE — 700105 HCHG RX REV CODE 258: Performed by: INTERNAL MEDICINE

## 2020-09-21 PROCEDURE — 700105 HCHG RX REV CODE 258: Performed by: HOSPITALIST

## 2020-09-21 PROCEDURE — 82962 GLUCOSE BLOOD TEST: CPT | Mod: 91

## 2020-09-21 PROCEDURE — 306560 TORSO SUPPORT,POSEY RESTR LG: Performed by: HOSPITALIST

## 2020-09-21 PROCEDURE — 99232 SBSQ HOSP IP/OBS MODERATE 35: CPT | Performed by: HOSPITALIST

## 2020-09-21 PROCEDURE — 99291 CRITICAL CARE FIRST HOUR: CPT | Performed by: INTERNAL MEDICINE

## 2020-09-21 PROCEDURE — A9270 NON-COVERED ITEM OR SERVICE: HCPCS | Performed by: INTERNAL MEDICINE

## 2020-09-21 PROCEDURE — 80048 BASIC METABOLIC PNL TOTAL CA: CPT

## 2020-09-21 PROCEDURE — 700102 HCHG RX REV CODE 250 W/ 637 OVERRIDE(OP): Performed by: HOSPITALIST

## 2020-09-21 PROCEDURE — 700101 HCHG RX REV CODE 250: Performed by: INTERNAL MEDICINE

## 2020-09-21 PROCEDURE — 51798 US URINE CAPACITY MEASURE: CPT

## 2020-09-21 PROCEDURE — 76775 US EXAM ABDO BACK WALL LIM: CPT

## 2020-09-21 PROCEDURE — 770022 HCHG ROOM/CARE - ICU (200)

## 2020-09-21 PROCEDURE — 700101 HCHG RX REV CODE 250: Performed by: HOSPITALIST

## 2020-09-21 PROCEDURE — 700111 HCHG RX REV CODE 636 W/ 250 OVERRIDE (IP): Performed by: HOSPITALIST

## 2020-09-21 PROCEDURE — 85025 COMPLETE CBC W/AUTO DIFF WBC: CPT

## 2020-09-21 PROCEDURE — HZ2ZZZZ DETOXIFICATION SERVICES FOR SUBSTANCE ABUSE TREATMENT: ICD-10-PCS | Performed by: HOSPITALIST

## 2020-09-21 PROCEDURE — 83605 ASSAY OF LACTIC ACID: CPT

## 2020-09-21 PROCEDURE — 700102 HCHG RX REV CODE 250 W/ 637 OVERRIDE(OP): Performed by: INTERNAL MEDICINE

## 2020-09-21 PROCEDURE — A9270 NON-COVERED ITEM OR SERVICE: HCPCS | Performed by: HOSPITALIST

## 2020-09-21 RX ORDER — LORAZEPAM 2 MG/ML
2 INJECTION INTRAMUSCULAR EVERY 4 HOURS
Status: DISCONTINUED | OUTPATIENT
Start: 2020-09-21 | End: 2020-09-23

## 2020-09-21 RX ORDER — FOLIC ACID 1 MG/1
1 TABLET ORAL DAILY
Status: DISCONTINUED | OUTPATIENT
Start: 2020-09-22 | End: 2020-09-25 | Stop reason: HOSPADM

## 2020-09-21 RX ORDER — LORAZEPAM 1 MG/1
3 TABLET ORAL
Status: DISCONTINUED | OUTPATIENT
Start: 2020-09-21 | End: 2020-09-21

## 2020-09-21 RX ORDER — LORAZEPAM 2 MG/ML
1.5 INJECTION INTRAMUSCULAR
Status: DISCONTINUED | OUTPATIENT
Start: 2020-09-21 | End: 2020-09-21

## 2020-09-21 RX ORDER — LORAZEPAM 1 MG/1
2 TABLET ORAL
Status: DISCONTINUED | OUTPATIENT
Start: 2020-09-21 | End: 2020-09-21

## 2020-09-21 RX ORDER — M-VIT,TX,IRON,MINS/CALC/FOLIC 27MG-0.4MG
1 TABLET ORAL DAILY
Status: DISCONTINUED | OUTPATIENT
Start: 2020-09-21 | End: 2020-09-21

## 2020-09-21 RX ORDER — INSULIN GLARGINE 100 [IU]/ML
20 INJECTION, SOLUTION SUBCUTANEOUS
Status: DISCONTINUED | OUTPATIENT
Start: 2020-09-22 | End: 2020-09-25

## 2020-09-21 RX ORDER — HALOPERIDOL 5 MG/ML
2.5-5 INJECTION INTRAMUSCULAR EVERY 6 HOURS PRN
Status: DISCONTINUED | OUTPATIENT
Start: 2020-09-21 | End: 2020-09-25 | Stop reason: HOSPADM

## 2020-09-21 RX ORDER — LORAZEPAM 2 MG/ML
1 INJECTION INTRAMUSCULAR
Status: DISCONTINUED | OUTPATIENT
Start: 2020-09-21 | End: 2020-09-21

## 2020-09-21 RX ORDER — LORAZEPAM 1 MG/1
1 TABLET ORAL EVERY 4 HOURS PRN
Status: DISCONTINUED | OUTPATIENT
Start: 2020-09-21 | End: 2020-09-21

## 2020-09-21 RX ORDER — LORAZEPAM 2 MG/ML
1-2 INJECTION INTRAMUSCULAR
Status: DISCONTINUED | OUTPATIENT
Start: 2020-09-21 | End: 2020-09-23

## 2020-09-21 RX ORDER — TAMSULOSIN HYDROCHLORIDE 0.4 MG/1
0.4 CAPSULE ORAL
Status: DISCONTINUED | OUTPATIENT
Start: 2020-09-22 | End: 2020-09-25 | Stop reason: HOSPADM

## 2020-09-21 RX ORDER — THIAMINE MONONITRATE (VIT B1) 100 MG
100 TABLET ORAL DAILY
Status: DISCONTINUED | OUTPATIENT
Start: 2020-09-22 | End: 2020-09-25 | Stop reason: HOSPADM

## 2020-09-21 RX ORDER — LORAZEPAM 1 MG/1
4 TABLET ORAL
Status: DISCONTINUED | OUTPATIENT
Start: 2020-09-21 | End: 2020-09-21

## 2020-09-21 RX ORDER — LORAZEPAM 2 MG/ML
2 INJECTION INTRAMUSCULAR
Status: DISCONTINUED | OUTPATIENT
Start: 2020-09-21 | End: 2020-09-21

## 2020-09-21 RX ORDER — LORAZEPAM 2 MG/ML
0.5 INJECTION INTRAMUSCULAR EVERY 4 HOURS PRN
Status: DISCONTINUED | OUTPATIENT
Start: 2020-09-21 | End: 2020-09-21

## 2020-09-21 RX ORDER — DEXMEDETOMIDINE HYDROCHLORIDE 4 UG/ML
.1-1 INJECTION, SOLUTION INTRAVENOUS CONTINUOUS
Status: DISCONTINUED | OUTPATIENT
Start: 2020-09-21 | End: 2020-09-23

## 2020-09-21 RX ORDER — FOLIC ACID 1 MG/1
1 TABLET ORAL DAILY
Status: DISCONTINUED | OUTPATIENT
Start: 2020-09-21 | End: 2020-09-21

## 2020-09-21 RX ORDER — LORAZEPAM 1 MG/1
0.5 TABLET ORAL EVERY 4 HOURS PRN
Status: DISCONTINUED | OUTPATIENT
Start: 2020-09-21 | End: 2020-09-21

## 2020-09-21 RX ADMIN — LORAZEPAM 2 MG: 2 INJECTION INTRAMUSCULAR; INTRAVENOUS at 22:23

## 2020-09-21 RX ADMIN — SODIUM CHLORIDE 3 G: 900 INJECTION INTRAVENOUS at 21:13

## 2020-09-21 RX ADMIN — LORAZEPAM 1.5 MG: 2 INJECTION INTRAMUSCULAR; INTRAVENOUS at 13:36

## 2020-09-21 RX ADMIN — ATORVASTATIN CALCIUM 20 MG: 20 TABLET, FILM COATED ORAL at 07:23

## 2020-09-21 RX ADMIN — DEXMEDETOMIDINE HYDROCHLORIDE 0.2 MCG/KG/HR: 100 INJECTION, SOLUTION INTRAVENOUS at 22:56

## 2020-09-21 RX ADMIN — AMLODIPINE BESYLATE 10 MG: 10 TABLET ORAL at 07:23

## 2020-09-21 RX ADMIN — LORAZEPAM 2 MG: 2 INJECTION INTRAMUSCULAR; INTRAVENOUS at 10:11

## 2020-09-21 RX ADMIN — THIAMINE HYDROCHLORIDE: 100 INJECTION, SOLUTION INTRAMUSCULAR; INTRAVENOUS at 15:50

## 2020-09-21 RX ADMIN — THIAMINE HYDROCHLORIDE 100 MG: 100 INJECTION, SOLUTION INTRAMUSCULAR; INTRAVENOUS at 11:38

## 2020-09-21 RX ADMIN — HYDRALAZINE HYDROCHLORIDE 5 MG: 10 TABLET, FILM COATED ORAL at 11:38

## 2020-09-21 RX ADMIN — LORAZEPAM 2 MG: 1 TABLET ORAL at 11:38

## 2020-09-21 RX ADMIN — LABETALOL HYDROCHLORIDE 10 MG: 5 INJECTION, SOLUTION INTRAVENOUS at 19:27

## 2020-09-21 RX ADMIN — FOLIC ACID 1 MG: 1 TABLET ORAL at 11:38

## 2020-09-21 RX ADMIN — LISINOPRIL 40 MG: 20 TABLET ORAL at 07:23

## 2020-09-21 RX ADMIN — INSULIN GLARGINE 17 UNITS: 100 INJECTION, SOLUTION SUBCUTANEOUS at 06:48

## 2020-09-21 RX ADMIN — POTASSIUM CHLORIDE AND SODIUM CHLORIDE: 900; 150 INJECTION, SOLUTION INTRAVENOUS at 18:11

## 2020-09-21 RX ADMIN — HALOPERIDOL LACTATE 5 MG: 5 INJECTION, SOLUTION INTRAMUSCULAR at 13:35

## 2020-09-21 RX ADMIN — ENOXAPARIN SODIUM 40 MG: 40 INJECTION SUBCUTANEOUS at 16:03

## 2020-09-21 RX ADMIN — AMOXICILLIN AND CLAVULANATE POTASSIUM 1 TABLET: 875; 125 TABLET, FILM COATED ORAL at 07:23

## 2020-09-21 RX ADMIN — LORAZEPAM 2 MG: 2 INJECTION INTRAMUSCULAR; INTRAVENOUS at 06:08

## 2020-09-21 RX ADMIN — POTASSIUM CHLORIDE AND SODIUM CHLORIDE: 900; 150 INJECTION, SOLUTION INTRAVENOUS at 07:24

## 2020-09-21 ASSESSMENT — LIFESTYLE VARIABLES
NAUSEA AND VOMITING: NO NAUSEA AND NO VOMITING
PAROXYSMAL SWEATS: NO SWEAT VISIBLE
TOTAL SCORE: 16
AGITATION: *
ORIENTATION AND CLOUDING OF SENSORIUM: DATE DISORIENTATION BY MORE THAN TWO CALENDAR DAYS
NAUSEA AND VOMITING: NO NAUSEA AND NO VOMITING
AUDITORY DISTURBANCES: NOT PRESENT
TOTAL SCORE: 12
AUDITORY DISTURBANCES: NOT PRESENT
TREMOR: NO TREMOR
ANXIETY: *
HEADACHE, FULLNESS IN HEAD: VERY MILD
ORIENTATION AND CLOUDING OF SENSORIUM: DATE DISORIENTATION BY MORE THAN TWO CALENDAR DAYS
AGITATION: *
TREMOR: NO TREMOR
VISUAL DISTURBANCES: NOT PRESENT
PAROXYSMAL SWEATS: NO SWEAT VISIBLE
ANXIETY: *
HEADACHE, FULLNESS IN HEAD: VERY MILD
VISUAL DISTURBANCES: NOT PRESENT

## 2020-09-21 ASSESSMENT — ENCOUNTER SYMPTOMS
TREMORS: 1
FEVER: 0
STRIDOR: 0
EYE REDNESS: 0
VOMITING: 0
HEMOPTYSIS: 0
COUGH: 0
EYE DISCHARGE: 0
FLANK PAIN: 0
ABDOMINAL PAIN: 0

## 2020-09-21 ASSESSMENT — COGNITIVE AND FUNCTIONAL STATUS - GENERAL
SUGGESTED CMS G CODE MODIFIER DAILY ACTIVITY: CN
TOILETING: TOTAL
WALKING IN HOSPITAL ROOM: TOTAL
SUGGESTED CMS G CODE MODIFIER MOBILITY: CM
MOBILITY SCORE: 7
DRESSING REGULAR LOWER BODY CLOTHING: TOTAL
MOVING TO AND FROM BED TO CHAIR: UNABLE
MOVING FROM LYING ON BACK TO SITTING ON SIDE OF FLAT BED: UNABLE
DRESSING REGULAR UPPER BODY CLOTHING: TOTAL
STANDING UP FROM CHAIR USING ARMS: TOTAL
DAILY ACTIVITIY SCORE: 6
CLIMB 3 TO 5 STEPS WITH RAILING: TOTAL
TURNING FROM BACK TO SIDE WHILE IN FLAT BAD: A LOT
HELP NEEDED FOR BATHING: TOTAL
EATING MEALS: TOTAL
PERSONAL GROOMING: TOTAL

## 2020-09-21 NOTE — PROGRESS NOTES
1430:  Post ordered ativan and haldol administration, Pt now RASS -4, abdominal breathing, and snoring loudly; SpaO2 remains >/= 92% on 3L via oxymask flow.  Dr. Carcamo notified.      1530: Pt RASS score remains -4, appears to have increased work of breathing since 1430, spaO2 still remains >/= 92% on 3L via oxymask.  Dr. Carcamo at bedside.  Pt waking up to painful stimuli and demonstrates strong cough.  Dr. Carcamo states to continue with plan of care at this time.

## 2020-09-21 NOTE — THERAPY
Missed Therapy     Patient Name: Louis Orourke  Age:  64 y.o., Sex:  male  Medical Record #: [id number]  Today's Date: 9/21/2020    Discussed missed therapy with nursing staff.       09/21/20 1035   Interdisciplinary Plan of Care Collaboration   IDT Collaboration with  Nursing   Collaboration Comments OT consult received and acknowledged. Per nursing staff, hold OT eval today as pt is entering withdrawals and confused. Will initiate eval as pt becomes able/appropriate to participate.

## 2020-09-21 NOTE — ASSESSMENT & PLAN NOTE
Continue CIWA protocol with Ativan.  Continuous cardiac monitoring  Monitor electrolytes and replace accordingly, particularly magnesium, potassium and phosphorus  Fall, seizure, aspiration precautions.  Thiamine folic acid and multivitamin.  Patient will need close follow-up in the outpatient for alcohol cessation.

## 2020-09-21 NOTE — PROGRESS NOTES
Hospital Medicine Daily Progress Note    Date of Service  9/21/2020    Chief Complaint  64 y.o. male admitted 9/19/2020 with generalized weakness    Hospital Course      64 y.o. male with a past medical history of polysubstance use including marijuana, methamphetamine, diabetes, noncompliance admitted 9/19 with diabetic ketoacidosis and hypertensive urgency.  He initially went to the intensive care unit, Lactic acid 6.2.  Anemia with Hb 10.4. Na 129 but glucose 719.    Potassium 2.7.  Cr 2.25, prior WNL.  He was started on aggressive intravenous fluids and intravenous insulin. He was then cleared to to move to the floor on 9/20. Initial labs/imagings show WBC 12.5.  Chest xray clear.     Creatinine improving slowly with rehydration.  Lactic acid trended down from 6.2 on admission down to 3.7, then down to 2.1.           Interval Problem Update  Tachycardic 90s-100, appears to be going through alcohol withdrawal.  We will start CIWA protocol we will check potassium and magnesium and replace accordingly.  Had transfer orders to medical floor placed on 9/20, I discussed with intensive care, Dr. Carcamo, but patient will likely need to stay in the ICU given his high CIWA scores and severe agitation.  He also may need some form of sedation.   No significant improvement in renal function despite hydration, will check renal ultrasound.  Discussed with patient, patient's nurse, intensive care Dr Carcamo.       Consultants/Specialty  Intensive care    Code Status  Full Code    Disposition  Going through alcohol withdrawals, may need to stay on ICU     Review of Systems  Review of Systems   Unable to perform ROS: Mental status change (, Encephalopathy going through alcohol withdrawal)   Constitutional: Negative for fever.   Eyes: Negative for discharge and redness.   Respiratory: Negative for cough, hemoptysis and stridor.    Cardiovascular: Negative for chest pain.   Gastrointestinal: Negative for abdominal pain and vomiting.    Genitourinary: Negative for flank pain and hematuria.   Skin: Negative for itching.   Neurological: Positive for tremors.   Psychiatric/Behavioral:        Unable to assess reliably      Physical Exam  Temp:  [36.2 °C (97.2 °F)-36.7 °C (98 °F)] 36.2 °C (97.2 °F)  Pulse:  [] 100  BP: (154-178)/() 154/104  SpO2:  [96 %-99 %] 99 %    Physical Exam  Constitutional:       General: He is not in acute distress.     Appearance: He is not ill-appearing or diaphoretic.   HENT:      Head: Atraumatic.      Right Ear: External ear normal.      Left Ear: External ear normal.      Nose: No congestion or rhinorrhea.      Mouth/Throat:      Mouth: Mucous membranes are dry.   Eyes:      General: No scleral icterus.        Right eye: No discharge.         Left eye: No discharge.      Pupils: Pupils are equal, round, and reactive to light.   Neck:      Musculoskeletal: Neck supple. No neck rigidity or muscular tenderness.   Cardiovascular:      Rate and Rhythm: Normal rate and regular rhythm.      Heart sounds: No friction rub. No gallop.    Pulmonary:      Effort: No respiratory distress.      Breath sounds: No stridor. No wheezing.   Abdominal:      Palpations: Abdomen is soft.      Tenderness: There is no abdominal tenderness. There is no guarding or rebound.   Musculoskeletal:      Right lower leg: No edema.      Left lower leg: No edema.   Skin:     General: Skin is dry.      Capillary Refill: Capillary refill takes 2 to 3 seconds.      Coloration: Skin is pale. Skin is not jaundiced.   Neurological:      Mental Status: He is alert. He is disoriented.      Coordination: Coordination abnormal.      Comments: Slowed speech, unchanged from yesterday  Confused, disoriented, encephalopathic,tremors  Generalized weakness, power 5/5 in both upper and lower extremities   Psychiatric:      Comments: Agitated       Fluids    Intake/Output Summary (Last 24 hours) at 9/21/2020 1246  Last data filed at 9/21/2020 0600  Gross per 24  hour   Intake 1533.84 ml   Output 2800 ml   Net -1266.16 ml       Laboratory  Recent Labs     09/19/20  1856 09/20/20  0015 09/21/20  0515   WBC 12.1* 12.3* 10.4   RBC 3.77* 3.74* 3.88*   HEMOGLOBIN 10.4* 10.3* 10.7*   HEMATOCRIT 32.3* 31.0* 32.9*   MCV 85.7 82.9 84.8   MCH 27.6 27.5 27.6   MCHC 32.2* 33.2* 32.5*   RDW 41.8 40.0 42.5   PLATELETCT 309 299 317   MPV 10.2 9.8 9.7     Recent Labs     09/20/20  0015 09/20/20  0605 09/21/20  0515   SODIUM 136 142 139   POTASSIUM 3.6 3.2* 3.6   CHLORIDE 102 108 105   CO2 16* 20 20   GLUCOSE 499* 246* 196*   BUN 27* 24* 19   CREATININE 2.17* 2.02* 2.25*   CALCIUM 9.9 9.5 10.0                   Imaging  DX-CHEST-PORTABLE (1 VIEW)   Final Result      No acute cardiopulmonary abnormality.      US-RENAL    (Results Pending)      Assessment/Plan  Alcohol withdrawal (HCC)  Assessment & Plan  [facility] protocol   Continuous cardiac monitoring  Monitor electrolytes and replace accordingly, particularly magnesium, potassium and phosphorus  Fall, seizure, aspiration precautions.  Thiamine folic acid and multivitamin.  Counseling was mentally clear.     Hypertensive urgency  Assessment & Plan  Admitted with hypertensive urgency, resolved   Amlodipine and lisinopril with hold parameters.  Labetalol and hydralazine for extreme hypertension    SUKHWINDER (acute kidney injury) (HCC)  Assessment & Plan  Mostly due to dehydration   Intravenous fluids  Avoid nephrotoxins, monitor inputs and outputs  Will consider renal ultrasound      Anemia, unspecified  Assessment & Plan  No evidence of gross bleeding.  Continue to monitor transfuse for hemoglobin of less than or equal to 7.  Age-appropriate cancer screening has recommended     Leukocytosis  Assessment & Plan  Likely reactive severe dehydration, hypertensive urgency   No focal sign of infection at this point   Continue to montior for signs of infection      Hypokalemia  Assessment & Plan  Replacing, continue to monitor and replace as  needed    Uncontrolled diabetes mellitus (HCC)- (present on admission)  Assessment & Plan  With hyperglycemia ~720s  Glycated hemoglobin 9.5% nearly year ago  Long & short acting insulin, glargine increased 9/21/2020   Accu-Checks, hypoglycemia protocol     Hypertension- (present on admission)  Assessment & Plan  Admitted with hypertensive urgency  Amlodipine and lisinopril with hold parameters.  Labetalol and hydralazine for extreme hypertension     VTE prophylaxis: Lovenox

## 2020-09-21 NOTE — PROGRESS NOTES
"Critical Care Progress Note    Date of admission  9/19/2020    Chief Complaint  64 y.o. male admitted 9/19/2020 with DKA    Hospital Course    \"64 y.o. male who presented 9/19/2020 with not feeling well. Poorly controlled diabetic type 2 with last hgaic 9.5.  He does have hx of etoh use, drug use w/ methamphetamines and marijuana.  AG elevated with co2 13 with beta hydroxy negative.  Hx hypertension with sbp over 200s.  Patient poor historian regarding medications hx.  Also hx of dyslipidemia.  WBC 12.5.  Chest xray clear.  Lactic acid 6.2.  Anemia with hb 10.4.  Sodium 129 but glucose 719.  k 2.7.  Cr 2.25, prior normal.  vbg normal ph.  He has a high work of breathing. No hx of heart failure.  I ordered additional 3 L IVF.  Maintenance fluids 200 ml/hr LR.  He has not used any methamphetamines recently.  Limited etoh use, not daily.  Rode motorcycle to hospital and he is functional at baseline. Limited use of home medications. No recent signs of infection, no pneumonia, no urinary symptoms.\"      Interval Problem Update  Reviewed last 24 hour events:   - Reconsulted for agitated delirium 2/2 EtOh withdrawal   - Neuro: Confused, agitated    - HR: 90s-120s   - SBP: 130s-160s   - GI: NPO   - UOP: 4L/24 hrs   - Saavedra: condom   - Tm: 36.7   - Lines: PIV   - PPx: GI not indicated, DVT lovenox   - 3L oxymask   - CXR (personally reviewed and compared to prior): no new    Updates: given Haldol/ativan, RASS -3 to -4 following administration. Renal US with B/L hydro and bladder distention c/w obstructive uropathy      Review of Systems  Review of Systems   Unable to perform ROS: Mental acuity        Vital Signs for last 24 hours   Temp:  [36.2 °C (97.2 °F)-36.7 °C (98 °F)] 36.2 °C (97.2 °F)  Pulse:  [] 100  BP: (154-178)/() 154/104  SpO2:  [96 %-99 %] 99 %    Hemodynamic parameters for last 24 hours       Respiratory Information for the last 24 hours       Physical Exam   Physical Exam  Vitals signs and nursing " note reviewed.   Constitutional:       General: He is in acute distress.      Appearance: He is ill-appearing.   HENT:      Head: Normocephalic and atraumatic.      Right Ear: External ear normal.      Left Ear: External ear normal.      Nose: Nose normal.      Mouth/Throat:      Mouth: Mucous membranes are dry.      Pharynx: Oropharynx is clear.   Eyes:      Extraocular Movements: Extraocular movements intact.      Pupils: Pupils are equal, round, and reactive to light.   Neck:      Musculoskeletal: Normal range of motion.   Cardiovascular:      Rate and Rhythm: Regular rhythm. Tachycardia present.   Pulmonary:      Effort: Tachypnea present. No respiratory distress.      Breath sounds: No wheezing or rales.      Comments: Sonorous respirations prior to NPA placement  Abdominal:      General: Abdomen is flat. Bowel sounds are normal.      Palpations: Abdomen is soft.   Neurological:      General: No focal deficit present.      Mental Status: He is confused.      GCS: GCS eye subscore is 2. GCS verbal subscore is 2. GCS motor subscore is 5.      Motor: Motor function is intact.   Psychiatric:         Behavior: Behavior is uncooperative and agitated.         Medications  Current Facility-Administered Medications   Medication Dose Route Frequency Provider Last Rate Last Dose   • [START ON 9/22/2020] insulin glargine (Lantus) injection  20 Units Subcutaneous QAM INSULIN Shawn Mahoney M.D.       • haloperidol lactate (HALDOL) injection 2.5-5 mg  2.5-5 mg Intravenous Q6HRS PRN Shawn Mahoney M.D.   5 mg at 09/21/20 1335   • dexmedetomidine (PRECEDEX) 400 mcg/100mL NS premix infusion  0.1-1 mcg/kg/hr Intravenous Continuous Raf Carcamo Jr., D.O.       • LORazepam (ATIVAN) injection 2 mg  2 mg Intravenous Q4HRS Raf Carcamo Jr. D.O.       • LORazepam (ATIVAN) injection 1-2 mg  1-2 mg Intravenous Q2HRS PRN Raf Carcamo Jr. D.O.       • detox IV 1000 mL (D5LR + magnesium 1 g + thiamine 100 mg + folic acid 1  mg) infusion   Intravenous Once Raf Carcamo Jr., D.O.        And   • [START ON 9/22/2020] thiamine tablet 100 mg  100 mg Oral DAILY Raf Carcamo Jr., D.O.        And   • [START ON 9/22/2020] multivitamin (THERAGRAN) tablet 1 Tab  1 Tab Oral DAILY Raf Carcamo Jr., D.O.        And   • [START ON 9/22/2020] folic acid (FOLVITE) tablet 1 mg  1 mg Oral DAILY Raf Carcamo Jr., D.O.       • lisinopril (PRINIVIL) tablet 40 mg  40 mg Oral DAILY Waqas Iqbal M.D.   40 mg at 09/21/20 0723   • insulin regular (HumuLIN R,NovoLIN R) injection  3-14 Units Subcutaneous 4X/DAY JANET Verdugo M.D.   Stopped at 09/21/20 1100    And   • glucose 4 g chewable tablet 16 g  16 g Oral Q15 MIN PRN Caitlin Verdugo M.D.        And   • dextrose 50% (D50W) injection 50 mL  50 mL Intravenous Q15 MIN PRN Caitlin Verdugo M.D.       • enoxaparin (LOVENOX) inj 40 mg  40 mg Subcutaneous Q EVENING Caitlin Verdugo M.D.   40 mg at 09/20/20 1739   • hydrALAZINE (APRESOLINE) tablet 5 mg  5 mg Oral Q6HRS PRN Shawn Mahoney M.D.   5 mg at 09/21/20 1138   • 0.9 % NaCl with KCl 20 mEq infusion   Intravenous Continuous Shawn Mahoney M.D. 83 mL/hr at 09/21/20 0743     • amoxicillin-clavulanate (AUGMENTIN) 875-125 MG per tablet 1 Tab  1 Tab Oral Q12HRS Waqas Iqbal M.D.   1 Tab at 09/21/20 0723   • labetalol (NORMODYNE/TRANDATE) injection 10 mg  10 mg Intravenous Q4HRS PRN Waqas Iqbal M.D.   10 mg at 09/20/20 2211   • enalaprilat (VASOTEC) injection 1.25 mg  1.25 mg Intravenous Q6HRS PRN Waqas Iqbal M.D.       • dextrose 50% (D50W) injection 25-50 mL  25-50 mL Intravenous PRN Waqas Iqbal M.D.       • senna-docusate (PERICOLACE or SENOKOT S) 8.6-50 MG per tablet 2 Tab  2 Tab Oral BID Waqas Iqbal M.D.   2 Tab at 09/20/20 1739    And   • polyethylene glycol/lytes (MIRALAX) PACKET 1 Packet  1 Packet Oral QDAY PRN Waqas Iqbal M.D.        And   • magnesium hydroxide (MILK OF MAGNESIA) suspension 30 mL  30 mL Oral QDAY PRN  Waqas Iqbal M.D.        And   • bisacodyl (DULCOLAX) suppository 10 mg  10 mg Rectal QDAY PRN Waqas Iqbal M.D.       • acetaminophen (TYLENOL) tablet 650 mg  650 mg Oral Q6HRS PRN Waqas Iqbal M.D.       • ondansetron (ZOFRAN) syringe/vial injection 4 mg  4 mg Intravenous Q4HRS PRN Waqas Iqbal M.D.       • ondansetron (ZOFRAN ODT) dispertab 4 mg  4 mg Oral Q4HRS PRN Waqas Iqbal M.D.   4 mg at 09/20/20 2039   • amLODIPine (NORVASC) tablet 10 mg  10 mg Oral Q DAY Waqas Iqbal M.D.   10 mg at 09/21/20 0723   • atorvastatin (LIPITOR) tablet 20 mg  20 mg Oral DAILY Waqas Iqbal M.D.   20 mg at 09/21/20 0723       Fluids    Intake/Output Summary (Last 24 hours) at 9/21/2020 1345  Last data filed at 9/21/2020 0600  Gross per 24 hour   Intake 1143.67 ml   Output 2800 ml   Net -1656.33 ml       Laboratory          Recent Labs     09/19/20 2045 09/20/20  0015 09/20/20  0605 09/21/20  0515   SODIUM 133* 136 142 139   POTASSIUM 3.8 3.6 3.2* 3.6   CHLORIDE 100 102 108 105   CO2 14* 16* 20 20   BUN 30* 27* 24* 19   CREATININE 2.40* 2.17* 2.02* 2.25*   MAGNESIUM 1.7 1.6 1.6  --    CALCIUM 9.5 9.9 9.5 10.0     Recent Labs     09/19/20 1856 09/20/20  0015 09/20/20  0605 09/21/20  0515   ALTSGPT 9  --   --   --   --    ASTSGOT 12  --   --   --   --    ALKPHOSPHAT 128*  --   --   --   --    TBILIRUBIN 0.2  --   --   --   --    LIPASE 57  --   --   --   --    GLUCOSE 719*   < > 499* 246* 196*    < > = values in this interval not displayed.     Recent Labs     09/19/20  1856 09/20/20 0015 09/21/20  0515   WBC 12.1* 12.3* 10.4   NEUTSPOLYS 85.10*  --  78.40*   LYMPHOCYTES 7.40*  --  11.60*   MONOCYTES 6.00  --  7.10   EOSINOPHILS 0.50  --  2.10   BASOPHILS 0.30  --  0.30   ASTSGOT 12  --   --    ALTSGPT 9  --   --    ALKPHOSPHAT 128*  --   --    TBILIRUBIN 0.2  --   --      Recent Labs     09/19/20 1856 09/20/20 0015 09/21/20 0515   RBC 3.77* 3.74* 3.88*   HEMOGLOBIN 10.4* 10.3* 10.7*   HEMATOCRIT 32.3* 31.0*  32.9*   PLATELETCT 309 299 317       Imaging  Ultrasound:  Reviewed    Assessment/Plan  Alcohol withdrawal (HCC)- (present on admission)  Assessment & Plan  Severe agitated delirium  Ativan for baseline benzo  PRN precedex  Vitamins  Seizure/fall precautions  Monitor airway    SUKHWINDER (acute kidney injury) (HCC)- (present on admission)  Assessment & Plan  Post-obstructive  Place urrutia  Renal dose meds, avoid nephrotoxins  Strict I/Os  Follow renal function    Anemia, unspecified- (present on admission)  Assessment & Plan  No signs bleeding  Monitor    Leukocytosis  Assessment & Plan  Likely volume contraction  No obvious source infection  Blood cx ordered  Improved    Hypokalemia  Assessment & Plan  Replete to maintain >4    Uncontrolled diabetes mellitus (HCC)- (present on admission)  Assessment & Plan  Beta hydroxy 0.2  Hyperglycemic to 712.    Insulin drip started in ED; transitioned to lantus and SSI  Aggressive IVF resuscitation  9.2 prior HgA1c  Home meds: dulaglutide, lantus and metformin      Hypertension- (present on admission)  Assessment & Plan  Hypertensive urgency to 200s systolic  Poorly compliant  Continue amlodipine, atorvastatin and lisinopril  Scheduled and PRN for goal <180 mmHg    Acute urinary obstruction  Assessment & Plan  Place urrutia  Start flomax       VTE:  Lovenox  Ulcer: Not Indicated  Lines: Urrutia Catheter  Ongoing indication addressed    I have performed a physical exam and reviewed and updated ROS and Plan today (9/21/2020). In review of yesterday's note (9/20/2020), there are no changes except as documented above.     Discussed patient condition and risk of morbidity and/or mortality with Hospitalist, RN, RT, Pharmacy, Dietary, , Charge nurse / hot rounds and Patient     The patient remains critically ill.  Critical care time = 49 minutes in directly providing and coordinating critical care and extensive data review.  No time overlap and excludes procedures.

## 2020-09-21 NOTE — PROGRESS NOTES
Late entry :    Dr. Mahoney with hospitalist service at bedside.  This RN discusses Pt's increasing agitation and confusion, RASS score remains >/= +2 even with PRN ativan orders, Pt continues to be disoriented and speech (per report from jorge luis RN) continues to be mumbled and slurred.  Dr. Mahoney states he will place CIWA orders.

## 2020-09-21 NOTE — PROGRESS NOTES
Updated Dr. Mahoney Pt is still very agitated, RASS +3 to +4, ativan does not seem to affect RASS score.  Pt in bilateral soft wrist restraints and lap belt and still constantly making attempts to get out of bed.  This RN also updated Dr. Carcamo with intensivist team service.  Orders received, see MAR.

## 2020-09-22 LAB
ANION GAP SERPL CALC-SCNC: 12 MMOL/L (ref 7–16)
BASOPHILS # BLD AUTO: 0.2 % (ref 0–1.8)
BASOPHILS # BLD: 0.02 K/UL (ref 0–0.12)
BUN SERPL-MCNC: 18 MG/DL (ref 8–22)
CALCIUM SERPL-MCNC: 9.1 MG/DL (ref 8.5–10.5)
CHLORIDE SERPL-SCNC: 116 MMOL/L (ref 96–112)
CO2 SERPL-SCNC: 19 MMOL/L (ref 20–33)
CREAT SERPL-MCNC: 1.91 MG/DL (ref 0.5–1.4)
EOSINOPHIL # BLD AUTO: 0.21 K/UL (ref 0–0.51)
EOSINOPHIL NFR BLD: 2.3 % (ref 0–6.9)
ERYTHROCYTE [DISTWIDTH] IN BLOOD BY AUTOMATED COUNT: 45 FL (ref 35.9–50)
GLUCOSE BLD-MCNC: 100 MG/DL (ref 65–99)
GLUCOSE BLD-MCNC: 111 MG/DL (ref 65–99)
GLUCOSE BLD-MCNC: 193 MG/DL (ref 65–99)
GLUCOSE SERPL-MCNC: 118 MG/DL (ref 65–99)
HCT VFR BLD AUTO: 33 % (ref 42–52)
HGB BLD-MCNC: 10.6 G/DL (ref 14–18)
IMM GRANULOCYTES # BLD AUTO: 0.02 K/UL (ref 0–0.11)
IMM GRANULOCYTES NFR BLD AUTO: 0.2 % (ref 0–0.9)
LYMPHOCYTES # BLD AUTO: 0.66 K/UL (ref 1–4.8)
LYMPHOCYTES NFR BLD: 7.3 % (ref 22–41)
MCH RBC QN AUTO: 28.1 PG (ref 27–33)
MCHC RBC AUTO-ENTMCNC: 32.1 G/DL (ref 33.7–35.3)
MCV RBC AUTO: 87.5 FL (ref 81.4–97.8)
MONOCYTES # BLD AUTO: 0.56 K/UL (ref 0–0.85)
MONOCYTES NFR BLD AUTO: 6.2 % (ref 0–13.4)
NEUTROPHILS # BLD AUTO: 7.63 K/UL (ref 1.82–7.42)
NEUTROPHILS NFR BLD: 83.8 % (ref 44–72)
NRBC # BLD AUTO: 0 K/UL
NRBC BLD-RTO: 0 /100 WBC
PLATELET # BLD AUTO: 281 K/UL (ref 164–446)
PMV BLD AUTO: 9.9 FL (ref 9–12.9)
POTASSIUM SERPL-SCNC: 4.2 MMOL/L (ref 3.6–5.5)
RBC # BLD AUTO: 3.77 M/UL (ref 4.7–6.1)
SODIUM SERPL-SCNC: 147 MMOL/L (ref 135–145)
TSH SERPL DL<=0.005 MIU/L-ACNC: 1.06 UIU/ML (ref 0.38–5.33)
VIT B12 SERPL-MCNC: 785 PG/ML (ref 211–911)
WBC # BLD AUTO: 9.1 K/UL (ref 4.8–10.8)

## 2020-09-22 PROCEDURE — 700111 HCHG RX REV CODE 636 W/ 250 OVERRIDE (IP): Performed by: INTERNAL MEDICINE

## 2020-09-22 PROCEDURE — 700102 HCHG RX REV CODE 250 W/ 637 OVERRIDE(OP): Performed by: INTERNAL MEDICINE

## 2020-09-22 PROCEDURE — 700105 HCHG RX REV CODE 258: Performed by: INTERNAL MEDICINE

## 2020-09-22 PROCEDURE — 97162 PT EVAL MOD COMPLEX 30 MIN: CPT

## 2020-09-22 PROCEDURE — 97166 OT EVAL MOD COMPLEX 45 MIN: CPT

## 2020-09-22 PROCEDURE — 700101 HCHG RX REV CODE 250: Performed by: INTERNAL MEDICINE

## 2020-09-22 PROCEDURE — 85025 COMPLETE CBC W/AUTO DIFF WBC: CPT

## 2020-09-22 PROCEDURE — 770022 HCHG ROOM/CARE - ICU (200)

## 2020-09-22 PROCEDURE — 700101 HCHG RX REV CODE 250: Performed by: HOSPITALIST

## 2020-09-22 PROCEDURE — 82607 VITAMIN B-12: CPT

## 2020-09-22 PROCEDURE — 82962 GLUCOSE BLOOD TEST: CPT | Mod: 91

## 2020-09-22 PROCEDURE — 80048 BASIC METABOLIC PNL TOTAL CA: CPT

## 2020-09-22 PROCEDURE — 51798 US URINE CAPACITY MEASURE: CPT

## 2020-09-22 PROCEDURE — 99291 CRITICAL CARE FIRST HOUR: CPT | Performed by: INTERNAL MEDICINE

## 2020-09-22 PROCEDURE — A9270 NON-COVERED ITEM OR SERVICE: HCPCS | Performed by: INTERNAL MEDICINE

## 2020-09-22 PROCEDURE — 84443 ASSAY THYROID STIM HORMONE: CPT

## 2020-09-22 RX ADMIN — DEXMEDETOMIDINE HYDROCHLORIDE 0.5 MCG/KG/HR: 100 INJECTION, SOLUTION INTRAVENOUS at 08:31

## 2020-09-22 RX ADMIN — POTASSIUM CHLORIDE AND SODIUM CHLORIDE: 900; 150 INJECTION, SOLUTION INTRAVENOUS at 17:14

## 2020-09-22 RX ADMIN — INSULIN GLARGINE 20 UNITS: 100 INJECTION, SOLUTION SUBCUTANEOUS at 08:31

## 2020-09-22 RX ADMIN — ENOXAPARIN SODIUM 40 MG: 40 INJECTION SUBCUTANEOUS at 17:14

## 2020-09-22 RX ADMIN — LORAZEPAM 2 MG: 2 INJECTION INTRAMUSCULAR; INTRAVENOUS at 04:48

## 2020-09-22 RX ADMIN — POTASSIUM CHLORIDE AND SODIUM CHLORIDE: 900; 150 INJECTION, SOLUTION INTRAVENOUS at 04:54

## 2020-09-22 RX ADMIN — LORAZEPAM 2 MG: 2 INJECTION INTRAMUSCULAR; INTRAVENOUS at 12:52

## 2020-09-22 RX ADMIN — SODIUM CHLORIDE 3 G: 900 INJECTION INTRAVENOUS at 04:48

## 2020-09-22 RX ADMIN — LORAZEPAM 2 MG: 2 INJECTION INTRAMUSCULAR; INTRAVENOUS at 17:13

## 2020-09-22 RX ADMIN — TAMSULOSIN HYDROCHLORIDE 0.4 MG: 0.4 CAPSULE ORAL at 12:52

## 2020-09-22 RX ADMIN — LORAZEPAM 2 MG: 2 INJECTION INTRAMUSCULAR; INTRAVENOUS at 21:46

## 2020-09-22 ASSESSMENT — GAIT ASSESSMENTS
DEVIATION: BRADYKINETIC
DISTANCE (FEET): 2
GAIT LEVEL OF ASSIST: MODERATE ASSIST
ASSISTIVE DEVICE: HAND HELD ASSIST

## 2020-09-22 ASSESSMENT — COGNITIVE AND FUNCTIONAL STATUS - GENERAL
SUGGESTED CMS G CODE MODIFIER MOBILITY: CM
STANDING UP FROM CHAIR USING ARMS: A LOT
TOILETING: TOTAL
DRESSING REGULAR LOWER BODY CLOTHING: TOTAL
PERSONAL GROOMING: A LOT
MOVING FROM LYING ON BACK TO SITTING ON SIDE OF FLAT BED: UNABLE
WALKING IN HOSPITAL ROOM: A LOT
MOBILITY SCORE: 8
MOVING TO AND FROM BED TO CHAIR: UNABLE
SUGGESTED CMS G CODE MODIFIER DAILY ACTIVITY: CL
DAILY ACTIVITIY SCORE: 9
TURNING FROM BACK TO SIDE WHILE IN FLAT BAD: UNABLE
EATING MEALS: A LOT
DRESSING REGULAR UPPER BODY CLOTHING: A LOT
HELP NEEDED FOR BATHING: TOTAL
CLIMB 3 TO 5 STEPS WITH RAILING: TOTAL

## 2020-09-22 ASSESSMENT — ACTIVITIES OF DAILY LIVING (ADL): TOILETING: UNABLE TO DETERMINE AT THIS TIME

## 2020-09-22 NOTE — ASSESSMENT & PLAN NOTE
Severe agitated delirium, improving  Transition PRN precedex to lorazepam RASS targeted protocol  Vitamins  Seizure/fall precautions  Monitor airway

## 2020-09-22 NOTE — PROGRESS NOTES
Patient's urrutia output noted to change from yellow, clear to pink tinged. MD updated, orders received for q4 bladder scan to ensure no obstruction of catheter.

## 2020-09-22 NOTE — PROGRESS NOTES
12 hour chart check     Monitor Summary:     Sinus rhythm, rate of 80-98bpm, occasional PVCs    .14/.10/.30

## 2020-09-22 NOTE — ASSESSMENT & PLAN NOTE
Post-obstructive  urrutia in place  Renal dose meds, avoid nephrotoxins  Strict I/Os  Follow renal function, continues to improve

## 2020-09-22 NOTE — PROGRESS NOTES
"Critical Care Progress Note    Date of admission  9/19/2020    Chief Complaint  64 y.o. male admitted 9/19/2020 with DKA    Hospital Course    \"64 y.o. male who presented 9/19/2020 with not feeling well. Poorly controlled diabetic type 2 with last hgaic 9.5.  He does have hx of etoh use, drug use w/ methamphetamines and marijuana.  AG elevated with co2 13 with beta hydroxy negative.  Hx hypertension with sbp over 200s.  Patient poor historian regarding medications hx.  Also hx of dyslipidemia.  WBC 12.5.  Chest xray clear.  Lactic acid 6.2.  Anemia with hb 10.4.  Sodium 129 but glucose 719.  k 2.7.  Cr 2.25, prior normal.  vbg normal ph.  He has a high work of breathing. No hx of heart failure.  I ordered additional 3 L IVF.  Maintenance fluids 200 ml/hr LR.  He has not used any methamphetamines recently.  Limited etoh use, not daily.  Rode motorcycle to hospital and he is functional at baseline. Limited use of home medications. No recent signs of infection, no pneumonia, no urinary symptoms.\"      Interval Problem Update  Reviewed last 24 hour events:   -On Precedex intermittently overnight   - Neuro: More alert today   - HR: 90s-100s   - SBP: 80s-150s   - GI: Poor intake due to encephalopathy   - UOP: 6L/24 hrs   - Saavedra: yes   - Tm: 37.1   - Lines: PIV   - PPx: GI not indicated, DVT Lovenox   - 3L Oxymask   - CXR (personally reviewed and compared to prior): no new    Yesterday   - Reconsulted for agitated delirium 2/2 EtOh withdrawal   - Neuro: Confused, agitated    - HR: 90s-120s   - SBP: 130s-160s   - GI: NPO   - UOP: 4L/24 hrs   - Saavedra: condom   - Tm: 36.7   - Lines: PIV   - PPx: GI not indicated, DVT lovenox   - 3L oxymask   - CXR (personally reviewed and compared to prior): no new    Updates: given Haldol/ativan, RASS -3 to -4 following administration. Renal US with B/L hydro and bladder distention c/w obstructive uropathy      Review of Systems  Review of Systems   Unable to perform ROS: Mental acuity    "     Vital Signs for last 24 hours   Temp:  [35.8 °C (96.4 °F)-37.1 °C (98.7 °F)] 35.8 °C (96.4 °F)  Pulse:  [] 71  Resp:  [14-40] 20  BP: ()/() 104/70  SpO2:  [92 %-100 %] 97 %    Hemodynamic parameters for last 24 hours       Respiratory Information for the last 24 hours       Physical Exam   Physical Exam  Vitals signs and nursing note reviewed.   Constitutional:       General: He is in acute distress.      Appearance: He is ill-appearing.      Comments: Very somnolent however will open eyes and muttersounds   HENT:      Head: Normocephalic and atraumatic.      Right Ear: External ear normal.      Left Ear: External ear normal.      Nose: Nose normal.      Mouth/Throat:      Mouth: Mucous membranes are dry.      Pharynx: Oropharynx is clear.   Eyes:      Extraocular Movements: Extraocular movements intact.      Pupils: Pupils are equal, round, and reactive to light.   Neck:      Musculoskeletal: Normal range of motion.   Cardiovascular:      Rate and Rhythm: Regular rhythm. Tachycardia present.      Pulses: Normal pulses.   Pulmonary:      Effort: Tachypnea present. No respiratory distress.      Breath sounds: No wheezing or rales.      Comments: Sonorous respirations prior to NPA placement  Abdominal:      General: Abdomen is flat. Bowel sounds are normal.      Palpations: Abdomen is soft.   Musculoskeletal:         General: No signs of injury.   Skin:     General: Skin is warm and dry.      Capillary Refill: Capillary refill takes less than 2 seconds.   Neurological:      General: No focal deficit present.      Mental Status: He is confused.      GCS: GCS eye subscore is 2. GCS verbal subscore is 2. GCS motor subscore is 5.      Motor: Motor function is intact.      Comments: Somnolent and confused with intermittent agitation   Psychiatric:         Behavior: Behavior is uncooperative and agitated.         Medications  Current Facility-Administered Medications   Medication Dose Route Frequency  Provider Last Rate Last Dose   • insulin glargine (Lantus) injection  20 Units Subcutaneous QAM INSULIN Shawn Mahoney M.D.   20 Units at 09/22/20 0831   • haloperidol lactate (HALDOL) injection 2.5-5 mg  2.5-5 mg Intravenous Q6HRS PRN Shawn Mahoney M.D.   5 mg at 09/21/20 1335   • dexmedetomidine (PRECEDEX) 400 mcg/100mL NS premix infusion  0.1-1 mcg/kg/hr Intravenous Continuous Raf Carcamo Jr. D.O. 10.6 mL/hr at 09/22/20 0831 0.5 mcg/kg/hr at 09/22/20 0831   • LORazepam (ATIVAN) injection 2 mg  2 mg Intravenous Q4HRS OSKAR Allen Jr..O.   2 mg at 09/22/20 0448   • LORazepam (ATIVAN) injection 1-2 mg  1-2 mg Intravenous Q2HRS PRN Raf Carcamo Jr. D.O.       • thiamine tablet 100 mg  100 mg Oral DAILY Raf Carcamo Jr. D.O.   Stopped at 09/22/20 0600    And   • multivitamin (THERAGRAN) tablet 1 Tab  1 Tab Oral DAILY OSKAR Allen Jr..OMike   Stopped at 09/22/20 0600    And   • folic acid (FOLVITE) tablet 1 mg  1 mg Oral DAILY OSKAR Allen Jr..O.   Stopped at 09/22/20 0600   • tamsulosin (FLOMAX) capsule 0.4 mg  0.4 mg Oral AFTER BREAKFAST Raf Carcamo Jr., D.O.       • ampicillin/sulbactam (UNASYN) 3 g in  mL IVPB  3 g Intravenous Q6HRS Sulaiman Gordon M.D.   Stopped at 09/22/20 0518   • lisinopril (PRINIVIL) tablet 40 mg  40 mg Oral DAILY Waqas Iqbal M.D.   Stopped at 09/22/20 0600   • insulin regular (HumuLIN R,NovoLIN R) injection  3-14 Units Subcutaneous 4X/DAY ACHTAHIR Verdugo M.D.   Stopped at 09/22/20 0700    And   • glucose 4 g chewable tablet 16 g  16 g Oral Q15 MIN PRN Caitlin Verdugo M.D.        And   • dextrose 50% (D50W) injection 50 mL  50 mL Intravenous Q15 MIN PRN Caitlin Verdugo M.D.       • enoxaparin (LOVENOX) inj 40 mg  40 mg Subcutaneous Q EVENING Caitlin Verdugo M.D.   40 mg at 09/21/20 1603   • hydrALAZINE (APRESOLINE) tablet 5 mg  5 mg Oral Q6HRS PRN Shawn Mahoney M.D.   5 mg at 09/21/20 1138   • 0.9 % NaCl with KCl 20 mEq infusion    Intravenous Continuous Shawn Mahoney M.D. 83 mL/hr at 09/22/20 0709     • labetalol (NORMODYNE/TRANDATE) injection 10 mg  10 mg Intravenous Q4HRS PRN Waqas Iqbal M.D.   10 mg at 09/21/20 1927   • enalaprilat (VASOTEC) injection 1.25 mg  1.25 mg Intravenous Q6HRS PRN Waqas Iqbal M.D.       • dextrose 50% (D50W) injection 25-50 mL  25-50 mL Intravenous PRN Waqas Iqbal M.D.       • senna-docusate (PERICOLACE or SENOKOT S) 8.6-50 MG per tablet 2 Tab  2 Tab Oral BID Waqas Iqbal M.D.   Stopped at 09/21/20 1800    And   • polyethylene glycol/lytes (MIRALAX) PACKET 1 Packet  1 Packet Oral QDAY PRN Waqas Iqbal M.D.        And   • magnesium hydroxide (MILK OF MAGNESIA) suspension 30 mL  30 mL Oral QDAY PRN Waqas Iqbal M.D.        And   • bisacodyl (DULCOLAX) suppository 10 mg  10 mg Rectal QDAY PRN Waqas Iqbal M.D.       • acetaminophen (TYLENOL) tablet 650 mg  650 mg Oral Q6HRS PRN Waqas Iqbal M.D.       • ondansetron (ZOFRAN) syringe/vial injection 4 mg  4 mg Intravenous Q4HRS PRN Waqas Iqbal M.D.       • ondansetron (ZOFRAN ODT) dispertab 4 mg  4 mg Oral Q4HRS PRN Waqas Iqbal M.D.   4 mg at 09/20/20 2039   • amLODIPine (NORVASC) tablet 10 mg  10 mg Oral Q DAY Waqas Iqbal M.D.   Stopped at 09/22/20 0600   • atorvastatin (LIPITOR) tablet 20 mg  20 mg Oral DAILY Waqas Iqbal M.D.   Stopped at 09/22/20 0600       Fluids    Intake/Output Summary (Last 24 hours) at 9/22/2020 0852  Last data filed at 9/22/2020 0600  Gross per 24 hour   Intake 3792.47 ml   Output 5950 ml   Net -2157.53 ml       Laboratory          Recent Labs     09/19/20 2045 09/20/20  0015 09/20/20  0605 09/21/20  0515 09/22/20  0330   SODIUM 133* 136 142 139 147*   POTASSIUM 3.8 3.6 3.2* 3.6 4.2   CHLORIDE 100 102 108 105 116*   CO2 14* 16* 20 20 19*   BUN 30* 27* 24* 19 18   CREATININE 2.40* 2.17* 2.02* 2.25* 1.91*   MAGNESIUM 1.7 1.6 1.6  --   --    CALCIUM 9.5 9.9 9.5 10.0 9.1     Recent Labs     09/19/20  1856   09/20/20  0605 09/21/20  0515 09/22/20  0330   ALTSGPT 9  --   --   --   --    ASTSGOT 12  --   --   --   --    ALKPHOSPHAT 128*  --   --   --   --    TBILIRUBIN 0.2  --   --   --   --    LIPASE 57  --   --   --   --    GLUCOSE 719*   < > 246* 196* 118*    < > = values in this interval not displayed.     Recent Labs     09/19/20  1856 09/20/20  0015 09/21/20  0515 09/22/20  0330   WBC 12.1* 12.3* 10.4 9.1   NEUTSPOLYS 85.10*  --  78.40* 83.80*   LYMPHOCYTES 7.40*  --  11.60* 7.30*   MONOCYTES 6.00  --  7.10 6.20   EOSINOPHILS 0.50  --  2.10 2.30   BASOPHILS 0.30  --  0.30 0.20   ASTSGOT 12  --   --   --    ALTSGPT 9  --   --   --    ALKPHOSPHAT 128*  --   --   --    TBILIRUBIN 0.2  --   --   --      Recent Labs     09/20/20  0015 09/21/20 0515 09/22/20  0330   RBC 3.74* 3.88* 3.77*   HEMOGLOBIN 10.3* 10.7* 10.6*   HEMATOCRIT 31.0* 32.9* 33.0*   PLATELETCT 299 317 281       Imaging  Ultrasound:  Reviewed    Assessment/Plan  Alcohol withdrawal (HCC)- (present on admission)  Assessment & Plan  Severe agitated delirium  Ativan for baseline benzo  PRN precedex with RASS targeted protocol  Vitamins  Seizure/fall precautions  Monitor airway    SUKHWINDER (acute kidney injury) (HCC)- (present on admission)  Assessment & Plan  Post-obstructive  urrutia in place  Renal dose meds, avoid nephrotoxins  Strict I/Os  Follow renal function, currently improving    Anemia, unspecified- (present on admission)  Assessment & Plan  No signs bleeding  Monitor    Leukocytosis  Assessment & Plan  Likely volume contraction  No obvious source infection  Blood cx ordered  Resolved    Hypokalemia  Assessment & Plan  Replete to maintain >4    Uncontrolled diabetes mellitus (HCC)- (present on admission)  Assessment & Plan  Beta hydroxy 0.2  Hyperglycemic to 712.    Insulin drip started in ED; transitioned to lantus and SSI  Aggressive IVF resuscitation  9.2 prior HgA1c  Home meds: dulaglutide, lantus and metformin      Hypertension- (present on  admission)  Assessment & Plan  Hypertensive urgency to 200s systolic  Poorly compliant  Continue amlodipine, atorvastatin and lisinopril  Scheduled and PRN for goal <180 mmHg    Acute urinary obstruction  Assessment & Plan  Saavedra in place  Continue flomax       VTE:  Lovenox  Ulcer: Not Indicated  Lines: Saavedra Catheter  Ongoing indication addressed    I have performed a physical exam and reviewed and updated ROS and Plan today (9/22/2020). In review of yesterday's note (9/21/2020), there are no changes except as documented above.     Ongoing titration of sedative infusions for severe agitated delirium. This patient is critically ill, at high risk for decompensation leading to worsening vital organ dysfunction and death without critical care interventions.    Discussed patient condition and risk of morbidity and/or mortality with Hospitalist, RN, RT, Pharmacy, Dietary, , Charge nurse / hot rounds and Patient     The patient remains critically ill.  Critical care time = 34 minutes in directly providing and coordinating critical care and extensive data review.  No time overlap and excludes procedures.

## 2020-09-22 NOTE — DISCHARGE PLANNING
Care Transition Team Discharge Planning    Anticipated Discharge Disposition: TBD    Action: Per AM rounds, pt has urrutia in place due to retention. Pt admitted for DKA w/ history of diabetes Pt later started withdrawing from both meth and ETOH. Pt is now talking, but confused and A/O x1 to self only.      Barriers to Discharge: TBD    Plan: f/u w/ medical team

## 2020-09-22 NOTE — DIETARY
Nutrition Services: Update   Day 3 of admit.  Louis Orourke is a 64 y.o. male with admitting DX of Hyperglycemia due to diabetes mellitus, and metabolic acidosis.  Consult received and acknowledged for T2DM education.  Pt not appropriate for education at this time 2/2 meth and ETOH withdrawal, oriented to self.  +3L oxymask.  Will defer education at this time.  Poor PO intake noted with worsening mentation - MD added Boost Glucose Control.    Will continue to monitor for adequate intake and f/u regarding education as appropriate.

## 2020-09-22 NOTE — PROGRESS NOTES
Dr. Carcamo at bedside assessing Pt.  Pt arouses, opens eyes, moans, mumbles words, and coughs to physical stimulation.  Still utilizing abdominal muscles when breathing, SpaO2 remains >/= 92% on 3L via oxymask flow.  Dr. Carcamo placing nasal trumpet in right nare. Work of breathing improved.  Dr. Carcamo states to continue with plan of care at this time.

## 2020-09-22 NOTE — THERAPY
Physical Therapy   Initial Evaluation     Patient Name: Louis Orourke  Age:  64 y.o., Sex:  male  Medical Record #: [id number]  Today's Date: 9/22/2020     Precautions: Fall Risk    Assessment  Patient is 64 y.o. male admitted for DKA with hx of multi substance abuse (ETOH, Methamphetamines and marijuana). Pt very lethargic upon initiating PT evaluation due to Precedex and finally became more alert and participatory after 8 minutes following initial mobility to EOB and return BTB. Pt then was able to initiate mobility to EOB and intermittently maintain seated balance at EOB. Would experience LOB to R most consistently. Stood EOB with Max A and demonstrated difficulty initiating side step with R LE. PT to follow while in house.     Plan    Recommend Physical Therapy 3 times per week until therapy goals are met for the following treatments:  Bed Mobility, Equipment, Gait Training, Neuro Re-Education / Balance, Self Care/Home Evaluation, Stair Training, Therapeutic Activities and Therapeutic Exercises    DC Equipment Recommendations: (P) Unable to determine at this time  Discharge Recommendations: (P) Recommend post-acute placement for additional physical therapy services prior to discharge home         09/22/20 1112   Precautions   Precautions Fall Risk   Vitals   O2 Delivery Device Oxymask   Pain 0 - 10 Group   Therapist Pain Assessment During Activity;Nurse Notified  (no evidence of pain during PT session)   Prior Living Situation   Prior Services Unable To Determine At This Time   Comments pt unable to provide any PLOF/Hx at this time. Minimally alert to participate with therapy   Prior Level of Functional Mobility   Bed Mobility Unable To Determine At This Time   Cognition    Speech/ Communication Delayed Responses;Slurred   Level of Consciousness Lethargic   Comments pt very lethargic upon PT arrival. RN turned down Precedex with minimal response. Turned down more with pt finally becoming more participatory and  responsive after 8 min of intervention   Active ROM Lower Body    Active ROM Lower Body  X   Comments B LE limited by weakness and impaired command folllowing, appears WFL   Strength Lower Body   Lower Body Strength  X   Comments Gross, generalized weakness. Unable to formally assess 2/2 impaired command following. Grossly 3+/5 with knee extension and hip flexion   Balance Assessment   Sitting Balance (Static) Poor -   Sitting Balance (Dynamic) Trace   Standing Balance (Static) Trace   Standing Balance (Dynamic) Trace   Weight Shift Sitting Poor   Weight Shift Standing Poor   Comments seated balance slowly improved with time and facilitation at EOB,   Gait Analysis   Gait Level Of Assist Moderate Assist  (x2 people for safety)   Assistive Device Hand Held Assist   Distance (Feet) 2  (side stepping at EOB)   Deviation Bradykinetic  (impaired initiation, difficulty managing R LE)   # of Stairs Climbed 0   Weight Bearing Status No restritions   Bed Mobility    Supine to Sit Maximal Assist  (-> Mod A as pt became more alert)   Sit to Supine Moderate Assist   Scooting Maximal Assist  (-> Mod A with alertness)   Rolling Moderate Assist to Lt.;Moderate Assist to Rt.   Functional Mobility   Sit to Stand Moderate Assist   Bed, Chair, Wheelchair Transfer Unable to Participate   Short Term Goals    Short Term Goal # 1 pt will perform supine <> sit with Min A in 6 visits   Short Term Goal # 2 pt will perform sit <> stand and functional transfers with LRAD and Mod A to allow for functional mobility in 6 visits   Short Term Goal # 3 pt will ambulate > 15 ft with LRAD and Mod A to improve function in 6 visits   Problem List    Problems Impaired Bed Mobility;Impaired Transfers;Impaired Ambulation;Functional Strength Deficit;Impaired Balance;Decreased Activity Tolerance;Safety Awareness Deficits / Cognition   Anticipated Discharge Equipment and Recommendations   DC Equipment Recommendations Unable to determine at this time    Discharge Recommendations Recommend post-acute placement for additional physical therapy services prior to discharge home

## 2020-09-22 NOTE — THERAPY
Occupational Therapy   Initial Evaluation     Patient Name: Louis Orourke  Age:  64 y.o., Sex:  male  Medical Record #: [id number]  Today's Date: 9/22/2020     Precautions  Precautions: Fall Risk    Assessment  Patient is 64 y.o. male with a past medical history of polysubstance use including marijuana, methamphetamine, diabetes, noncompliance admitted 9/19 with diabetic ketoacidosis and hypertensive urgency. Pt presents to OT eval very lethargic, requiring significant assistance with functional mobility and ADLs. During first trial, pt required TotalA to move from supine to EOB and had difficulty keeping eyes open and fell asleep several times throughout mobility. Once pt returned to supine, pt became more alert and moved to EOB with modA. Once pt more alert, pt following 1-step commands consistently. Pt demonstrated bilateral  strength of 3+/5, shoulder flexion approximately 80-90 degrees bilaterally. Pt able to stand EOB with HHA x2, however had difficulty coordinating steps. Pt required maxA for LE dressing, however grooming/hygiene deferred due to pts lethargy. Pt unable to get to BSC this session due to impaired balance/coordination. Pt with minimal verbal output this session therefore unable to obtain PLOF/home setup. Pt grunted yes/no and asked for water.     Plan    Recommend Occupational Therapy 3 times per week until therapy goals are met for the following treatments:  Adaptive Equipment, Cognitive Skill Development, Manual Therapy Techniques, Neuro Re-Education / Balance, Self Care/Activities of Daily Living, Therapeutic Activities and Therapeutic Exercises.    DC Equipment Recommendations: Unable to determine at this time  Discharge Recommendations: Recommend post-acute placement for additional occupational therapy services prior to discharge home     Subjective    Pt very lethargic at first and become more alert/cooperative after 10 mins of sitting EOB with verbal/tactile cues.       Objective       09/22/20 1110   Prior Living Situation   Prior Services Unable To Determine At This Time   Equipment Owned Unable to Determine At This Time   Lives with - Patient's Self Care Capacity Unable To Determine At This Time   Comments Pt minimally verbal and not able to answer questions regarding home setup/PLOF. Will attempt again as pt is more alert and able to answer questions.   Prior Level of ADL Function   Self Feeding Unable To Determine At This Time   Grooming / Hygiene Unable To Determine At This Time   Bathing Unable To Determine At This Time   Dressing Unable To Determine At This Time   Toileting Unable To Determine At This Time   Prior Level of IADL Function   Medication Management Unable To Determine At This Time   Laundry Unable To Determine At This Time   Kitchen Mobility Unable To Determine At This Time   Finances Unable To Determine At This Time   Home Management Unable To Determine At This Time   Shopping Unable To Determine At This Time   Prior Level Of Mobility Unable to Determine At This Time   Driving / Transportation Unable To Determine At This Time   Occupation (Pre-Hospital Vocational) Unable To Determine At This Time   Leisure Interests Unable To Determine At This Time   Cognition    Cognition / Consciousness X   Speech/ Communication Delayed Responses;Slurred   Level of Consciousness Lethargic   Ability To Follow Commands 1 Step   Initiation Impaired   Comments Pt very lethargic throughout eval despite sternal rub and verbal/tactile cues. Pt took approximately 10 minutes to become more alert/participatory. Pt minimally verbal/grunting throughout with occasional requests for water or yes/no. Pt follows 1-step commands consistently when more alert, required max verbal cues for initiation due to lethargy.   Active ROM Upper Body   Comments AROM appears WFL due to pt attempting to reach for oxymask, however formal testing difficult due to pts lethargy, may have R-sided AROM deficits    Strength Upper Body   Upper Body Strength  X   Gross Strength Generalized Weakness, Equal Bilaterally.    Comments 3+/5 bilateral  strength, unable to test other muscle groups due to pts lethargy   Coordination Upper Body   Coordination Not Tested   Comments Unable to test due to pts lethargy/participation   Balance Assessment   Comments Seated balance improved throughout session, required min physical assist to maintain seated balance, HHA x2 for standing balance   Bed Mobility    Supine to Sit Moderate Assist   Comments cues for sequencing/initiation   ADL Assessment   Eating Maximal Assist  (to bring cup to mouth)   Grooming   (deferred due to pts lethargy/participation)   Lower Body Dressing Maximal Assist  (socks)   Toileting   (unable to get to BSC, catheter in place)   Functional Mobility   Sit to Stand Minimal Assist   Toilet Transfers Unable to Participate   Mobility supine to EOB, STS x1, attempted right lateral steps with HHA x2   Visual Perception   Visual Perception  Not Tested   Comments Pt tracking therapists throughout, verbal cues to keep eyes open, R eye appears to drift    Activity Tolerance   Comments limited by cognition, balance   Patient / Family Goals   Patient / Family Goal #1 Unable to state   Short Term Goals   Short Term Goal # 1 Pt will complete toilet transfer using BSC is needed with Alex by discharge.   Short Term Goal # 2 Pt will complete grooming/hygiene with Alex by discharge.   Short Term Goal # 3 Pt will complete LE dressing with Alex by discharge.

## 2020-09-22 NOTE — CARE PLAN
Problem: Nutritional:  Goal: Achieve adequate nutritional intake  Description: Patient will consume 50% or > of meals  Outcome: PROGRESSING SLOWER THAN EXPECTED

## 2020-09-22 NOTE — CARE PLAN
Problem: Safety - Medical Restraint  Goal: Remains free of injury from restraints (Restraint for Interference with Medical Device)  Description: INTERVENTIONS:  1. Determine that other, less restrictive measures have been tried or would not be effective before applying the restraint  2. Evaluate the patient's condition at the time of restraint application  3. Inform patient/family regarding the reason for restraint  4. Q2H: Monitor safety, psychosocial status, comfort, nutrition and hydration  Outcome: PROGRESSING AS EXPECTED  Flowsheets (Taken 9/21/2020 1821)  Addressed this shift: Remains free of injury from restraints (restraint for interference with medical device):   Determine that other, less restrictive measures have been tried or would not be effective before applying the restraint   Evaluate the patient's condition at the time of restraint application   Inform patient/family regarding the reason for restraint   Every 2 hours: Monitor safety, psychosocial status, comfort, nutrition and hydration  Note: Pt remains free from injury from restraint use.      Problem: Skin Integrity  Goal: Risk for impaired skin integrity will decrease  Outcome: PROGRESSING AS EXPECTED  Intervention: Assess risk factors for impaired skin integrity and/or pressure ulcers  Note: Pt displays no s/s of skin breakdown.        Problem: Bowel/Gastric:  Goal: Normal bowel function is maintained or improved  Flowsheets (Taken 9/21/2020 1200)  Last BM: 09/21/20  Number of Times Stooled: 1

## 2020-09-23 LAB
ANION GAP SERPL CALC-SCNC: 9 MMOL/L (ref 7–16)
BASOPHILS # BLD AUTO: 0.2 % (ref 0–1.8)
BASOPHILS # BLD: 0.02 K/UL (ref 0–0.12)
BUN SERPL-MCNC: 18 MG/DL (ref 8–22)
CALCIUM SERPL-MCNC: 8.9 MG/DL (ref 8.5–10.5)
CHLORIDE SERPL-SCNC: 114 MMOL/L (ref 96–112)
CO2 SERPL-SCNC: 19 MMOL/L (ref 20–33)
CREAT SERPL-MCNC: 1.6 MG/DL (ref 0.5–1.4)
EOSINOPHIL # BLD AUTO: 0.41 K/UL (ref 0–0.51)
EOSINOPHIL NFR BLD: 4.5 % (ref 0–6.9)
ERYTHROCYTE [DISTWIDTH] IN BLOOD BY AUTOMATED COUNT: 45.3 FL (ref 35.9–50)
GLUCOSE BLD-MCNC: 110 MG/DL (ref 65–99)
GLUCOSE BLD-MCNC: 239 MG/DL (ref 65–99)
GLUCOSE SERPL-MCNC: 119 MG/DL (ref 65–99)
HCT VFR BLD AUTO: 32.6 % (ref 42–52)
HGB BLD-MCNC: 10.2 G/DL (ref 14–18)
IMM GRANULOCYTES # BLD AUTO: 0.03 K/UL (ref 0–0.11)
IMM GRANULOCYTES NFR BLD AUTO: 0.3 % (ref 0–0.9)
LYMPHOCYTES # BLD AUTO: 1.43 K/UL (ref 1–4.8)
LYMPHOCYTES NFR BLD: 15.8 % (ref 22–41)
MCH RBC QN AUTO: 27.8 PG (ref 27–33)
MCHC RBC AUTO-ENTMCNC: 31.3 G/DL (ref 33.7–35.3)
MCV RBC AUTO: 88.8 FL (ref 81.4–97.8)
MONOCYTES # BLD AUTO: 0.82 K/UL (ref 0–0.85)
MONOCYTES NFR BLD AUTO: 9.1 % (ref 0–13.4)
NEUTROPHILS # BLD AUTO: 6.34 K/UL (ref 1.82–7.42)
NEUTROPHILS NFR BLD: 70.1 % (ref 44–72)
NRBC # BLD AUTO: 0 K/UL
NRBC BLD-RTO: 0 /100 WBC
PLATELET # BLD AUTO: 279 K/UL (ref 164–446)
PMV BLD AUTO: 10.4 FL (ref 9–12.9)
POTASSIUM SERPL-SCNC: 4.4 MMOL/L (ref 3.6–5.5)
RBC # BLD AUTO: 3.67 M/UL (ref 4.7–6.1)
SODIUM SERPL-SCNC: 142 MMOL/L (ref 135–145)
WBC # BLD AUTO: 9.1 K/UL (ref 4.8–10.8)

## 2020-09-23 PROCEDURE — 85025 COMPLETE CBC W/AUTO DIFF WBC: CPT

## 2020-09-23 PROCEDURE — 80048 BASIC METABOLIC PNL TOTAL CA: CPT

## 2020-09-23 PROCEDURE — A9270 NON-COVERED ITEM OR SERVICE: HCPCS | Performed by: INTERNAL MEDICINE

## 2020-09-23 PROCEDURE — 82962 GLUCOSE BLOOD TEST: CPT | Mod: 91

## 2020-09-23 PROCEDURE — 700111 HCHG RX REV CODE 636 W/ 250 OVERRIDE (IP): Performed by: INTERNAL MEDICINE

## 2020-09-23 PROCEDURE — 770022 HCHG ROOM/CARE - ICU (200)

## 2020-09-23 PROCEDURE — 700102 HCHG RX REV CODE 250 W/ 637 OVERRIDE(OP): Performed by: INTERNAL MEDICINE

## 2020-09-23 PROCEDURE — 700101 HCHG RX REV CODE 250: Performed by: HOSPITALIST

## 2020-09-23 PROCEDURE — 99233 SBSQ HOSP IP/OBS HIGH 50: CPT | Performed by: INTERNAL MEDICINE

## 2020-09-23 RX ORDER — LORAZEPAM 2 MG/ML
1 INJECTION INTRAMUSCULAR
Status: DISCONTINUED | OUTPATIENT
Start: 2020-09-23 | End: 2020-09-24

## 2020-09-23 RX ORDER — LORAZEPAM 2 MG/ML
4 INJECTION INTRAMUSCULAR
Status: DISCONTINUED | OUTPATIENT
Start: 2020-09-23 | End: 2020-09-24

## 2020-09-23 RX ORDER — LORAZEPAM 2 MG/ML
2 INJECTION INTRAMUSCULAR
Status: DISCONTINUED | OUTPATIENT
Start: 2020-09-23 | End: 2020-09-24

## 2020-09-23 RX ORDER — LORAZEPAM 2 MG/ML
3 INJECTION INTRAMUSCULAR
Status: DISCONTINUED | OUTPATIENT
Start: 2020-09-23 | End: 2020-09-24

## 2020-09-23 RX ADMIN — THERA TABS 1 TABLET: TAB at 06:22

## 2020-09-23 RX ADMIN — ENOXAPARIN SODIUM 40 MG: 40 INJECTION SUBCUTANEOUS at 17:17

## 2020-09-23 RX ADMIN — LORAZEPAM 2 MG: 2 INJECTION INTRAMUSCULAR; INTRAVENOUS at 02:00

## 2020-09-23 RX ADMIN — INSULIN GLARGINE 20 UNITS: 100 INJECTION, SOLUTION SUBCUTANEOUS at 06:33

## 2020-09-23 RX ADMIN — AMLODIPINE BESYLATE 10 MG: 10 TABLET ORAL at 06:21

## 2020-09-23 RX ADMIN — Medication 100 MG: at 06:22

## 2020-09-23 RX ADMIN — LORAZEPAM 2 MG: 2 INJECTION INTRAMUSCULAR; INTRAVENOUS at 06:23

## 2020-09-23 RX ADMIN — DOCUSATE SODIUM 50 MG AND SENNOSIDES 8.6 MG 2 TABLET: 8.6; 5 TABLET, FILM COATED ORAL at 06:22

## 2020-09-23 RX ADMIN — TAMSULOSIN HYDROCHLORIDE 0.4 MG: 0.4 CAPSULE ORAL at 09:57

## 2020-09-23 RX ADMIN — ATORVASTATIN CALCIUM 20 MG: 20 TABLET, FILM COATED ORAL at 06:22

## 2020-09-23 RX ADMIN — FOLIC ACID 1 MG: 1 TABLET ORAL at 06:22

## 2020-09-23 RX ADMIN — LISINOPRIL 40 MG: 20 TABLET ORAL at 06:22

## 2020-09-23 RX ADMIN — POTASSIUM CHLORIDE AND SODIUM CHLORIDE: 900; 150 INJECTION, SOLUTION INTRAVENOUS at 04:58

## 2020-09-23 ASSESSMENT — LIFESTYLE VARIABLES
VISUAL DISTURBANCES: NOT PRESENT
PAROXYSMAL SWEATS: NO SWEAT VISIBLE
AUDITORY DISTURBANCES: NOT PRESENT
NAUSEA AND VOMITING: NO NAUSEA AND NO VOMITING
NAUSEA AND VOMITING: NO NAUSEA AND NO VOMITING
AUDITORY DISTURBANCES: NOT PRESENT
HEADACHE, FULLNESS IN HEAD: NOT PRESENT
ORIENTATION AND CLOUDING OF SENSORIUM: CANNOT DO SERIAL ADDITIONS OR IS UNCERTAIN ABOUT DATE
TREMOR: NO TREMOR
TOTAL SCORE: 1
ORIENTATION AND CLOUDING OF SENSORIUM: CANNOT DO SERIAL ADDITIONS OR IS UNCERTAIN ABOUT DATE
AGITATION: NORMAL ACTIVITY
TOTAL SCORE: 2
ANXIETY: NO ANXIETY (AT EASE)
TREMOR: NO TREMOR
AGITATION: SOMEWHAT MORE THAN NORMAL ACTIVITY
PAROXYSMAL SWEATS: NO SWEAT VISIBLE
VISUAL DISTURBANCES: NOT PRESENT
ANXIETY: NO ANXIETY (AT EASE)
HEADACHE, FULLNESS IN HEAD: NOT PRESENT

## 2020-09-23 ASSESSMENT — FIBROSIS 4 INDEX
FIB4 SCORE: 0.93
FIB4 SCORE: 0.93

## 2020-09-23 ASSESSMENT — PATIENT HEALTH QUESTIONNAIRE - PHQ9
1. LITTLE INTEREST OR PLEASURE IN DOING THINGS: NOT AT ALL
SUM OF ALL RESPONSES TO PHQ9 QUESTIONS 1 AND 2: 0
2. FEELING DOWN, DEPRESSED, IRRITABLE, OR HOPELESS: NOT AT ALL

## 2020-09-23 NOTE — PROGRESS NOTES
12 hour chart check     Monitor Summary:    Sinus rhythm, rate of 70-86bpm, occasional PVCs:     .16/.10/.44

## 2020-09-23 NOTE — CARE PLAN
Problem: Safety - Medical Restraint  Goal: Remains free of injury from restraints (Restraint for Interference with Medical Device)  Description: INTERVENTIONS:  1. Determine that other, less restrictive measures have been tried or would not be effective before applying the restraint  2. Evaluate the patient's condition at the time of restraint application  3. Inform patient/family regarding the reason for restraint  4. Q2H: Monitor safety, psychosocial status, comfort, nutrition and hydration  Outcome: PROGRESSING AS EXPECTED  Goal: Free from restraint(s) (Restraint for Interference with Medical Device)  Description: INTERVENTIONS:  1. ONCE/SHIFT or MINIMUM Q12H: Assess and document the continuing need for restraints  2. Q24H: Continued use of restraint requires LIP to perform face to face examination and written order  3. Identify and implement measures to help patient regain control  Outcome: PROGRESSING AS EXPECTED  Flowsheets (Taken 9/22/2020 0411)  Addressed this shift: Free from restraint(s) (restraint for interference with medical device):   ONCE/SHIFT or MINIMUM Every 12 hours: Assess and document the continuing need for restraints   Every 24 hours: Continued use of restraint requires Licensed Independent Practitioner to perform face to face examination and written order   Identify and implement measures to help patient regain control  Note: Pt remains free from need for medical restraint use.  Pt able to follow directions and RASS score remains -1 to 0.       Problem: Respiratory:  Goal: Respiratory status will improve  Outcome: PROGRESSING AS EXPECTED  Intervention: Administer and titrate oxygen therapy  Note: SpaO2 remains >/= 92% on room air.

## 2020-09-23 NOTE — PROGRESS NOTES
"Critical Care Progress Note    Date of admission  9/19/2020    Chief Complaint  64 y.o. male admitted 9/19/2020 with DKA    Hospital Course    \"64 y.o. male who presented 9/19/2020 with not feeling well. Poorly controlled diabetic type 2 with last hgaic 9.5.  He does have hx of etoh use, drug use w/ methamphetamines and marijuana.  AG elevated with co2 13 with beta hydroxy negative.  Hx hypertension with sbp over 200s.  Patient poor historian regarding medications hx.  Also hx of dyslipidemia.  WBC 12.5.  Chest xray clear.  Lactic acid 6.2.  Anemia with hb 10.4.  Sodium 129 but glucose 719.  k 2.7.  Cr 2.25, prior normal.  vbg normal ph.  He has a high work of breathing. No hx of heart failure.  I ordered additional 3 L IVF.  Maintenance fluids 200 ml/hr LR.  He has not used any methamphetamines recently.  Limited etoh use, not daily.  Rode motorcycle to hospital and he is functional at baseline. Limited use of home medications. No recent signs of infection, no pneumonia, no urinary symptoms.\"      Interval Problem Update  Reviewed last 24 hour events:   - on dex overnight   - Neuro: confused but alert, transition to RASS based ativan   - HR: SR-ST, freq PVCs   - SBP: 100s-120s   - GI: improving intake   - UOP: 4.8L/24 hrs   - Saavedra: yes   - Tm: afeb   - Lines: PIV   - PPx: GI not indicated, DVT lovenox   - RA   - CXR (personally reviewed and compared to prior): no new    Yesterday   -On Precedex intermittently overnight   - Neuro: More alert today   - HR: 90s-100s   - SBP: 80s-150s   - GI: Poor intake due to encephalopathy   - UOP: 6L/24 hrs   - Saavedra: yes   - Tm: 37.1   - Lines: PIV   - PPx: GI not indicated, DVT Lovenox   - 3L Oxymask   - CXR (personally reviewed and compared to prior): no new    Review of Systems  Review of Systems   Unable to perform ROS: Mental acuity        Vital Signs for last 24 hours   Temp:  [35.7 °C (96.2 °F)-36.1 °C (97 °F)] 36.1 °C (97 °F)  Pulse:  [] 85  Resp:  [14-28] 22  BP: " ()/() 117/89  SpO2:  [88 %-100 %] 97 %    Hemodynamic parameters for last 24 hours       Respiratory Information for the last 24 hours       Physical Exam   Physical Exam  Vitals signs and nursing note reviewed.   Constitutional:       General: He is in acute distress.      Appearance: He is ill-appearing.      Comments: Awake and eating lunch   HENT:      Head: Normocephalic and atraumatic.      Right Ear: External ear normal.      Left Ear: External ear normal.      Nose: Nose normal.      Mouth/Throat:      Mouth: Mucous membranes are dry.      Pharynx: Oropharynx is clear.   Eyes:      Extraocular Movements: Extraocular movements intact.      Pupils: Pupils are equal, round, and reactive to light.   Neck:      Musculoskeletal: Normal range of motion.   Cardiovascular:      Rate and Rhythm: Regular rhythm. Tachycardia present.      Pulses: Normal pulses.   Pulmonary:      Effort: Pulmonary effort is normal. Tachypnea present. No respiratory distress.      Breath sounds: Normal breath sounds. No wheezing or rales.   Abdominal:      General: Abdomen is flat. Bowel sounds are normal.      Palpations: Abdomen is soft.   Musculoskeletal:         General: No signs of injury.   Skin:     General: Skin is warm and dry.      Capillary Refill: Capillary refill takes less than 2 seconds.   Neurological:      General: No focal deficit present.      Mental Status: He is disoriented and confused.      GCS: GCS eye subscore is 2. GCS verbal subscore is 2. GCS motor subscore is 5.      Cranial Nerves: No cranial nerve deficit.      Sensory: No sensory deficit.      Motor: Motor function is intact. No weakness.      Comments: Confused with intermittently agitated   Psychiatric:         Behavior: Behavior is uncooperative and agitated.         Medications  Current Facility-Administered Medications   Medication Dose Route Frequency Provider Last Rate Last Dose   • insulin glargine (Lantus) injection  20 Units  Subcutaneous QAM INSULIN Shawn Mahoney M.D.   20 Units at 09/23/20 0633   • haloperidol lactate (HALDOL) injection 2.5-5 mg  2.5-5 mg Intravenous Q6HRS PRN Shawn Mahoney M.D.   5 mg at 09/21/20 1335   • dexmedetomidine (PRECEDEX) 400 mcg/100mL NS premix infusion  0.1-1 mcg/kg/hr Intravenous Continuous Raf Carcamo Jr. D.OMike   Stopped at 09/23/20 0611   • LORazepam (ATIVAN) injection 2 mg  2 mg Intravenous Q4HRS Raf Carcamo Jr. D.O.   2 mg at 09/23/20 0623   • LORazepam (ATIVAN) injection 1-2 mg  1-2 mg Intravenous Q2HRS PRN Raf Carcamo Jr., D.O.       • thiamine tablet 100 mg  100 mg Oral DAILY Raf Carcamo Jr. D.O.   100 mg at 09/23/20 0622    And   • multivitamin (THERAGRAN) tablet 1 Tab  1 Tab Oral DAILY Raf Carcamo Jr., D.O.   1 Tab at 09/23/20 0622    And   • folic acid (FOLVITE) tablet 1 mg  1 mg Oral DAILY Raf Carcamo Jr., D.O.   1 mg at 09/23/20 0622   • tamsulosin (FLOMAX) capsule 0.4 mg  0.4 mg Oral AFTER BREAKFAST Raf Carcamo Jr., D.O.   0.4 mg at 09/22/20 1252   • lisinopril (PRINIVIL) tablet 40 mg  40 mg Oral DAILY Waqas Iqbal M.D.   40 mg at 09/23/20 0622   • insulin regular (HumuLIN R,NovoLIN R) injection  3-14 Units Subcutaneous 4X/DAY JANET Verdugo M.D.   Stopped at 09/23/20 0700    And   • glucose 4 g chewable tablet 16 g  16 g Oral Q15 MIN PRN Caitlin Verdugo M.D.        And   • dextrose 50% (D50W) injection 50 mL  50 mL Intravenous Q15 MIN PRN Caitlin Verdugo M.D.       • enoxaparin (LOVENOX) inj 40 mg  40 mg Subcutaneous Q EVENING Caitlin Verdugo M.D.   40 mg at 09/22/20 1714   • hydrALAZINE (APRESOLINE) tablet 5 mg  5 mg Oral Q6HRS PRN Shawn Mahoney M.D.   5 mg at 09/21/20 1138   • 0.9 % NaCl with KCl 20 mEq infusion   Intravenous Continuous Shawn Mahoney M.D. 83 mL/hr at 09/23/20 0458     • labetalol (NORMODYNE/TRANDATE) injection 10 mg  10 mg Intravenous Q4HRS PRN Waqas Iqbal M.D.   10 mg at 09/21/20 1927   • enalaprilat (VASOTEC) injection  1.25 mg  1.25 mg Intravenous Q6HRS PRN Waqas Iqbal M.D.       • dextrose 50% (D50W) injection 25-50 mL  25-50 mL Intravenous PRN Waqas Iqbal M.D.       • senna-docusate (PERICOLACE or SENOKOT S) 8.6-50 MG per tablet 2 Tab  2 Tab Oral BID Waqas Iqbal M.D.   2 Tab at 09/23/20 0622    And   • polyethylene glycol/lytes (MIRALAX) PACKET 1 Packet  1 Packet Oral QDAY PRN Waqas Iqbal M.D.        And   • magnesium hydroxide (MILK OF MAGNESIA) suspension 30 mL  30 mL Oral QDAY PRN Waqas Iqbal M.D.        And   • bisacodyl (DULCOLAX) suppository 10 mg  10 mg Rectal QDAY PRN Waqas Iqbal M.D.       • acetaminophen (TYLENOL) tablet 650 mg  650 mg Oral Q6HRS PRN Waqas Iqbal M.D.       • ondansetron (ZOFRAN) syringe/vial injection 4 mg  4 mg Intravenous Q4HRS PRN Waqas Iqbal M.D.       • ondansetron (ZOFRAN ODT) dispertab 4 mg  4 mg Oral Q4HRS PRN Waqas Iqbal M.D.   4 mg at 09/20/20 2039   • amLODIPine (NORVASC) tablet 10 mg  10 mg Oral Q DAY Waqas Iqbal M.D.   10 mg at 09/23/20 0621   • atorvastatin (LIPITOR) tablet 20 mg  20 mg Oral DAILY Waqas Iqbal M.D.   20 mg at 09/23/20 0622       Fluids    Intake/Output Summary (Last 24 hours) at 9/23/2020 0948  Last data filed at 9/23/2020 0900  Gross per 24 hour   Intake 4829.51 ml   Output 4685 ml   Net 144.51 ml       Laboratory          Recent Labs     09/21/20  0515 09/22/20  0330 09/23/20  0440   SODIUM 139 147* 142   POTASSIUM 3.6 4.2 4.4   CHLORIDE 105 116* 114*   CO2 20 19* 19*   BUN 19 18 18   CREATININE 2.25* 1.91* 1.60*   CALCIUM 10.0 9.1 8.9     Recent Labs     09/21/20  0515 09/22/20  0330 09/23/20  0440   GLUCOSE 196* 118* 119*     Recent Labs     09/21/20  0515 09/22/20  0330 09/23/20  0440   WBC 10.4 9.1 9.1   NEUTSPOLYS 78.40* 83.80* 70.10   LYMPHOCYTES 11.60* 7.30* 15.80*   MONOCYTES 7.10 6.20 9.10   EOSINOPHILS 2.10 2.30 4.50   BASOPHILS 0.30 0.20 0.20     Recent Labs     09/21/20  0515 09/22/20  0330 09/23/20  0440   RBC 3.88* 3.77*  3.67*   HEMOGLOBIN 10.7* 10.6* 10.2*   HEMATOCRIT 32.9* 33.0* 32.6*   PLATELETCT 317 281 279       Imaging  Ultrasound:  Reviewed    Assessment/Plan  Alcohol withdrawal (HCC)- (present on admission)  Assessment & Plan  Severe agitated delirium, improving  Transition PRN precedex to lorazepam RASS targeted protocol  Vitamins  Seizure/fall precautions  Monitor airway    SUKHWINDER (acute kidney injury) (HCC)- (present on admission)  Assessment & Plan  Post-obstructive  urrutia in place  Renal dose meds, avoid nephrotoxins  Strict I/Os  Follow renal function, continues to improve    Anemia, unspecified- (present on admission)  Assessment & Plan  No signs bleeding  Monitor    Leukocytosis  Assessment & Plan  Likely volume contraction  No obvious source infection  Resolved    Hypokalemia  Assessment & Plan  Replete to maintain >4    Uncontrolled diabetes mellitus (HCC)- (present on admission)  Assessment & Plan  Beta hydroxy 0.2  Hyperglycemic to 712.    Insulin drip started in ED; transitioned to lantus and SSI  Aggressive IVF resuscitation  9.2 prior HgA1c  Home meds: dulaglutide, lantus and metformin      Hypertension- (present on admission)  Assessment & Plan  Hypertensive urgency to 200s systolic  Poorly compliant  Continue amlodipine, atorvastatin and lisinopril  Scheduled and PRN for goal <180 mmHg    Acute urinary obstruction  Assessment & Plan  Urrutia in place  Continue flomax       VTE:  Lovenox  Ulcer: Not Indicated  Lines: Urrutia Catheter  Ongoing indication addressed    I have performed a physical exam and reviewed and updated ROS and Plan today (9/23/2020). In review of yesterday's note (9/22/2020), there are no changes except as documented above.     Discussed patient condition and risk of morbidity and/or mortality with RN, RT, Pharmacy, Dietary, , Charge nurse / hot rounds and Patient

## 2020-09-24 LAB
ALBUMIN SERPL BCP-MCNC: 3.2 G/DL (ref 3.2–4.9)
ALBUMIN/GLOB SERPL: 1 G/DL
ALP SERPL-CCNC: 111 U/L (ref 30–99)
ALT SERPL-CCNC: 13 U/L (ref 2–50)
ANION GAP SERPL CALC-SCNC: 11 MMOL/L (ref 7–16)
AST SERPL-CCNC: 15 U/L (ref 12–45)
BACTERIA BLD CULT: NORMAL
BILIRUB SERPL-MCNC: 0.2 MG/DL (ref 0.1–1.5)
BUN SERPL-MCNC: 22 MG/DL (ref 8–22)
CALCIUM SERPL-MCNC: 9.2 MG/DL (ref 8.5–10.5)
CHLORIDE SERPL-SCNC: 110 MMOL/L (ref 96–112)
CO2 SERPL-SCNC: 20 MMOL/L (ref 20–33)
CREAT SERPL-MCNC: 1.73 MG/DL (ref 0.5–1.4)
ERYTHROCYTE [DISTWIDTH] IN BLOOD BY AUTOMATED COUNT: 43.8 FL (ref 35.9–50)
GLOBULIN SER CALC-MCNC: 3.2 G/DL (ref 1.9–3.5)
GLUCOSE BLD-MCNC: 161 MG/DL (ref 65–99)
GLUCOSE BLD-MCNC: 184 MG/DL (ref 65–99)
GLUCOSE BLD-MCNC: 216 MG/DL (ref 65–99)
GLUCOSE BLD-MCNC: 316 MG/DL (ref 65–99)
GLUCOSE BLD-MCNC: 373 MG/DL (ref 65–99)
GLUCOSE SERPL-MCNC: 260 MG/DL (ref 65–99)
HCT VFR BLD AUTO: 33.3 % (ref 42–52)
HGB BLD-MCNC: 10.5 G/DL (ref 14–18)
MAGNESIUM SERPL-MCNC: 1.5 MG/DL (ref 1.5–2.5)
MCH RBC QN AUTO: 27.4 PG (ref 27–33)
MCHC RBC AUTO-ENTMCNC: 31.5 G/DL (ref 33.7–35.3)
MCV RBC AUTO: 86.9 FL (ref 81.4–97.8)
PLATELET # BLD AUTO: 339 K/UL (ref 164–446)
PMV BLD AUTO: 10.5 FL (ref 9–12.9)
POTASSIUM SERPL-SCNC: 3.8 MMOL/L (ref 3.6–5.5)
PROT SERPL-MCNC: 6.4 G/DL (ref 6–8.2)
RBC # BLD AUTO: 3.83 M/UL (ref 4.7–6.1)
SIGNIFICANT IND 70042: NORMAL
SITE SITE: NORMAL
SODIUM SERPL-SCNC: 141 MMOL/L (ref 135–145)
SOURCE SOURCE: NORMAL
WBC # BLD AUTO: 8.4 K/UL (ref 4.8–10.8)

## 2020-09-24 PROCEDURE — 700111 HCHG RX REV CODE 636 W/ 250 OVERRIDE (IP): Performed by: INTERNAL MEDICINE

## 2020-09-24 PROCEDURE — 700102 HCHG RX REV CODE 250 W/ 637 OVERRIDE(OP): Performed by: INTERNAL MEDICINE

## 2020-09-24 PROCEDURE — 82962 GLUCOSE BLOOD TEST: CPT | Mod: 91

## 2020-09-24 PROCEDURE — A9270 NON-COVERED ITEM OR SERVICE: HCPCS | Performed by: INTERNAL MEDICINE

## 2020-09-24 PROCEDURE — 770020 HCHG ROOM/CARE - TELE (206)

## 2020-09-24 PROCEDURE — 85027 COMPLETE CBC AUTOMATED: CPT

## 2020-09-24 PROCEDURE — 99233 SBSQ HOSP IP/OBS HIGH 50: CPT | Performed by: INTERNAL MEDICINE

## 2020-09-24 PROCEDURE — 80053 COMPREHEN METABOLIC PANEL: CPT

## 2020-09-24 PROCEDURE — 83735 ASSAY OF MAGNESIUM: CPT

## 2020-09-24 RX ORDER — LORAZEPAM 1 MG/1
1 TABLET ORAL EVERY 4 HOURS PRN
Status: DISCONTINUED | OUTPATIENT
Start: 2020-09-24 | End: 2020-09-25 | Stop reason: HOSPADM

## 2020-09-24 RX ORDER — LORAZEPAM 1 MG/1
0.5 TABLET ORAL EVERY 4 HOURS PRN
Status: DISCONTINUED | OUTPATIENT
Start: 2020-09-24 | End: 2020-09-25 | Stop reason: HOSPADM

## 2020-09-24 RX ORDER — LORAZEPAM 2 MG/1
4 TABLET ORAL
Status: DISCONTINUED | OUTPATIENT
Start: 2020-09-24 | End: 2020-09-25 | Stop reason: HOSPADM

## 2020-09-24 RX ORDER — LORAZEPAM 2 MG/ML
2 INJECTION INTRAMUSCULAR
Status: DISCONTINUED | OUTPATIENT
Start: 2020-09-24 | End: 2020-09-25 | Stop reason: HOSPADM

## 2020-09-24 RX ORDER — LORAZEPAM 2 MG/ML
1.5 INJECTION INTRAMUSCULAR
Status: DISCONTINUED | OUTPATIENT
Start: 2020-09-24 | End: 2020-09-25 | Stop reason: HOSPADM

## 2020-09-24 RX ORDER — LORAZEPAM 2 MG/1
2 TABLET ORAL
Status: DISCONTINUED | OUTPATIENT
Start: 2020-09-24 | End: 2020-09-25 | Stop reason: HOSPADM

## 2020-09-24 RX ORDER — LORAZEPAM 2 MG/ML
1 INJECTION INTRAMUSCULAR
Status: DISCONTINUED | OUTPATIENT
Start: 2020-09-24 | End: 2020-09-25 | Stop reason: HOSPADM

## 2020-09-24 RX ORDER — LORAZEPAM 2 MG/ML
0.5 INJECTION INTRAMUSCULAR EVERY 4 HOURS PRN
Status: DISCONTINUED | OUTPATIENT
Start: 2020-09-24 | End: 2020-09-25 | Stop reason: HOSPADM

## 2020-09-24 RX ADMIN — ACETAMINOPHEN 650 MG: 325 TABLET, FILM COATED ORAL at 22:47

## 2020-09-24 RX ADMIN — Medication 100 MG: at 04:19

## 2020-09-24 RX ADMIN — LORAZEPAM 2 MG: 2 INJECTION INTRAMUSCULAR; INTRAVENOUS at 01:00

## 2020-09-24 RX ADMIN — INSULIN GLARGINE 20 UNITS: 100 INJECTION, SOLUTION SUBCUTANEOUS at 05:17

## 2020-09-24 RX ADMIN — AMLODIPINE BESYLATE 10 MG: 10 TABLET ORAL at 04:19

## 2020-09-24 RX ADMIN — ATORVASTATIN CALCIUM 20 MG: 20 TABLET, FILM COATED ORAL at 04:19

## 2020-09-24 RX ADMIN — TAMSULOSIN HYDROCHLORIDE 0.4 MG: 0.4 CAPSULE ORAL at 08:58

## 2020-09-24 RX ADMIN — LISINOPRIL 40 MG: 20 TABLET ORAL at 04:19

## 2020-09-24 RX ADMIN — FOLIC ACID 1 MG: 1 TABLET ORAL at 04:19

## 2020-09-24 RX ADMIN — DOCUSATE SODIUM 50 MG AND SENNOSIDES 8.6 MG 2 TABLET: 8.6; 5 TABLET, FILM COATED ORAL at 04:20

## 2020-09-24 RX ADMIN — THERA TABS 1 TABLET: TAB at 04:19

## 2020-09-24 ASSESSMENT — LIFESTYLE VARIABLES
ANXIETY: MILDLY ANXIOUS
PAROXYSMAL SWEATS: BARELY PERCEPTIBLE SWEATING. PALMS MOIST
NAUSEA AND VOMITING: NO NAUSEA AND NO VOMITING
TOTAL SCORE: 3
PAROXYSMAL SWEATS: NO SWEAT VISIBLE
ORIENTATION AND CLOUDING OF SENSORIUM: CANNOT DO SERIAL ADDITIONS OR IS UNCERTAIN ABOUT DATE
PAROXYSMAL SWEATS: NO SWEAT VISIBLE
AGITATION: SOMEWHAT MORE THAN NORMAL ACTIVITY
ANXIETY: NO ANXIETY (AT EASE)
ORIENTATION AND CLOUDING OF SENSORIUM: CANNOT DO SERIAL ADDITIONS OR IS UNCERTAIN ABOUT DATE
TOTAL SCORE: 3
AUDITORY DISTURBANCES: NOT PRESENT
TREMOR: NO TREMOR
NAUSEA AND VOMITING: NO NAUSEA AND NO VOMITING
PAROXYSMAL SWEATS: NO SWEAT VISIBLE
TOTAL SCORE: 1
TREMOR: NO TREMOR
VISUAL DISTURBANCES: NOT PRESENT
ANXIETY: MILDLY ANXIOUS
VISUAL DISTURBANCES: NOT PRESENT
NAUSEA AND VOMITING: NO NAUSEA AND NO VOMITING
HEADACHE, FULLNESS IN HEAD: NOT PRESENT
ORIENTATION AND CLOUDING OF SENSORIUM: ORIENTED AND CAN DO SERIAL ADDITIONS
AUDITORY DISTURBANCES: NOT PRESENT
TREMOR: NO TREMOR
VISUAL DISTURBANCES: NOT PRESENT
HEADACHE, FULLNESS IN HEAD: NOT PRESENT
HEADACHE, FULLNESS IN HEAD: NOT PRESENT
AGITATION: NORMAL ACTIVITY
TREMOR: NO TREMOR
AUDITORY DISTURBANCES: NOT PRESENT
SUBSTANCE_ABUSE: 0
ORIENTATION AND CLOUDING OF SENSORIUM: CANNOT DO SERIAL ADDITIONS OR IS UNCERTAIN ABOUT DATE
HEADACHE, FULLNESS IN HEAD: NOT PRESENT
AGITATION: SOMEWHAT MORE THAN NORMAL ACTIVITY
AGITATION: NORMAL ACTIVITY
HEADACHE, FULLNESS IN HEAD: NOT PRESENT
TREMOR: TREMOR NOT VISIBLE BUT CAN BE FELT, FINGERTIP TO FINGERTIP
AUDITORY DISTURBANCES: NOT PRESENT
ANXIETY: MILDLY ANXIOUS
NAUSEA AND VOMITING: NO NAUSEA AND NO VOMITING
AGITATION: SOMEWHAT MORE THAN NORMAL ACTIVITY
NAUSEA AND VOMITING: NO NAUSEA AND NO VOMITING
ORIENTATION AND CLOUDING OF SENSORIUM: CANNOT DO SERIAL ADDITIONS OR IS UNCERTAIN ABOUT DATE
VISUAL DISTURBANCES: NOT PRESENT
TOTAL SCORE: 4
ANXIETY: NO ANXIETY (AT EASE)
VISUAL DISTURBANCES: NOT PRESENT
PAROXYSMAL SWEATS: NO SWEAT VISIBLE
TOTAL SCORE: 1
AUDITORY DISTURBANCES: NOT PRESENT

## 2020-09-24 ASSESSMENT — ENCOUNTER SYMPTOMS
CHILLS: 0
DIZZINESS: 0
WEIGHT LOSS: 0
EYE PAIN: 0
PHOTOPHOBIA: 0
DIARRHEA: 0
HALLUCINATIONS: 1
NECK PAIN: 0
ABDOMINAL PAIN: 0
SPEECH CHANGE: 0
NAUSEA: 0
BLURRED VISION: 0
PALPITATIONS: 0
CONSTIPATION: 0
VOMITING: 0
SPUTUM PRODUCTION: 0
FEVER: 0
MYALGIAS: 0
NERVOUS/ANXIOUS: 1
COUGH: 0
TINGLING: 0
DOUBLE VISION: 0
FOCAL WEAKNESS: 0
BACK PAIN: 0
SENSORY CHANGE: 0
ORTHOPNEA: 0
TREMORS: 0
SHORTNESS OF BREATH: 0
HEADACHES: 0

## 2020-09-24 NOTE — PROGRESS NOTES
Pt. Received at start of 1845 shift, out of bed to chair, calm and no complaints at this time. Will assess and continue to monitor.

## 2020-09-24 NOTE — CARE PLAN
Problem: Safety  Goal: Will remain free from falls  Outcome: PROGRESSING AS EXPECTED  Note: Bed locked and in low position, fall sock and fall band on, personal items and call bell within reach, reminder for pt to call if in need of help and to not get out of bed alone.      Problem: Bowel/Gastric:  Goal: Normal bowel function is maintained or improved  Outcome: PROGRESSING AS EXPECTED  Note: Early and frequent mobility, administer stool softeners as ordered, encourage food and fluid intake.

## 2020-09-24 NOTE — CONSULTS
Diabetes education: attempted to meet with pt this evening. Pt was sitting up in chair and looked at this CDE when spoke with him but he did not answer questions or respond to conversation.  Per chart pt has a hx of diabetes on Metformin, Lantus  And trulicity at home.   Pt was admitted with blood sugar of 720. Hg a1c not available.   Plan: Pt is not appropriate for education or discussion at this time. Please call [phone] if needs change.

## 2020-09-24 NOTE — CARE PLAN
Problem: Nutritional:  Goal: Achieve adequate nutritional intake  Description: Patient will consume 50% or > of meals  Outcome: MET     Pt eating well. Will decrease Boost supplements to 1x/day (vs BID).

## 2020-09-24 NOTE — PROGRESS NOTES
Diabetes education: Attempted to meet with pt this evening . Pt not appropriate. Please see consult note.    Plan: Pt is not appropriate for education or discussion at this time. Please call [phone] if needs change.

## 2020-09-24 NOTE — ASSESSMENT & PLAN NOTE
Found to have acute urine obstruction on ultrasound renal.  Has been managed with Saavedra and Flomax since 9/21/2020.  Patient is complaining of pain in catheter.  If patient continues to have urinary retention, may require reinsertion of Saavedra and possibly urology consult.  We will DC Saavedra and attempt trial without catheter  Monitor urine output closely for signs of retention  Continue Flomax

## 2020-09-24 NOTE — CONSULTS
Hospital Medicine Consultation    Date of Service  9/24/2020    Referring Physician  Dr. Carcamo    Consulting Physician  Marcelina Franks M.D.    Reason for Consultation    Management for poorly controlled diabetes and alcohol withdrawal    History of Presenting Illness    64 y.o. male with past medical history of poorly controlled diabetes and history of alcohol abuse and drug use presented to the hospital on 9/19/2020 with DKA.  On presentation he found to have elevated anion gap with bicarb of 13.  He also found to have alcohol withdrawal and he was started on Precedex.  For diabetic ketoacidosis he was started on insulin drip in ER and then transition to Lantus and sliding scale.  Today intensivist Dr. Carcamo requested his transfer from ICU to floor.  This morning he is hemodynamically stable.  I ordered lab work-up for this morning which include CBC and BMP.  His last anion gap was 9.0 from yesterday.    Currently he is on Lantus 20 units and insulin sliding scale.  For high blood pressure he is on amlodipine and lisinopril.  He is on p.o. vitamins CIWA protocol with Ativan for alcohol withdrawal.    I evaluated and examined him at the bedside.  He thinks Saavedra's catheter is a warm.  I explained him regarding importance of Saavedra's catheter.  He knows that he is in the hospital.  I discussed plan of care with bedside RN.      Review of Systems  Review of Systems   Constitutional: Negative for chills, fever and weight loss.   HENT: Negative for hearing loss and tinnitus.    Eyes: Negative for blurred vision, double vision, photophobia and pain.   Respiratory: Negative for cough, sputum production and shortness of breath.    Cardiovascular: Negative for chest pain, palpitations, orthopnea and leg swelling.   Gastrointestinal: Negative for abdominal pain, constipation, diarrhea, nausea and vomiting.   Genitourinary: Negative for dysuria, frequency and urgency.        Urinary retention   Musculoskeletal:  Negative for back pain, joint pain, myalgias and neck pain.   Skin: Negative for rash.   Neurological: Negative for dizziness, tingling, tremors, sensory change, speech change, focal weakness and headaches.   Psychiatric/Behavioral: Positive for hallucinations. Negative for substance abuse. The patient is nervous/anxious.    All other systems reviewed and are negative.      Past Medical History   has a past medical history of Hyperlipidemia, Hypertension, and Type II or unspecified type diabetes mellitus without mention of complication, not stated as uncontrolled.    Surgical History   has a past surgical history that includes irrigation & debridement ortho (Right, 8/12/2018) and toe amputation (Right, 8/12/2018).    Family History  family history includes Diabetes in his mother; Heart Disease in his mother; Lung Disease in his father; Stroke in his mother.    Social History   reports that he has never smoked. He has never used smokeless tobacco. He reports current alcohol use of about 4.2 oz of alcohol per week. He reports current drug use. Drugs: Marijuana and Methamphetamines.    Medications  Prior to Admission Medications   Prescriptions Last Dose Informant Patient Reported? Taking?   ARIPiprazole (ABILIFY) 10 MG Tab   No No   Sig: Take 1 Tab by mouth every day.   Cholecalciferol 4000 units Cap   No No   Sig: Take 1 Capsule by mouth every day.   Dulaglutide 0.75 MG/0.5ML Solution Pen-injector   No No   Sig: Inject 0.75 mg as instructed every Tuesday.   amLODIPine (NORVASC) 5 MG Tab   No No   Sig: TAKE 1 TABLET ORALLY ONCE DAILY   ammonium lactate (LAC-HYDRIN) 12 % Lotion   No No   Sig: Use 1/4 ribbon to each foot daily to decrease dryness and corns/cracks   atorvastatin (LIPITOR) 20 MG Tab   No No   Sig: Take 1 Tab by mouth every day.   fluticasone (FLONASE) 50 MCG/ACT nasal spray   No No   Sig: Spray 1 Spray in nose every day.   insulin glargine (LANTUS) 100 UNIT/ML Solution Pen-injector injection   No No    Sig: Inject 80 Units as instructed every evening.   lisinopril (PRINIVIL, ZESTRIL) 40 MG tablet   No No   Sig: Take 1 Tab by mouth every day.   loratadine (CLARITIN) 10 MG Tab   No No   Sig: Take 1 Tab by mouth every day.   metformin (GLUCOPHAGE) 1000 MG tablet   No No   Sig: Take 1 Tab by mouth 2 times a day, with meals.      Facility-Administered Medications: None       Allergies  No Known Allergies    Physical Exam  Temp:  [36.1 °C (97 °F)-36.8 °C (98.2 °F)] 36.1 °C (97 °F)  Pulse:  [] 91  Resp:  [14-31] 14  BP: (104-143)/(64-94) 120/78  SpO2:  [93 %-100 %] 100 %    Physical Exam  Vitals signs reviewed.   Constitutional:       General: He is not in acute distress.     Appearance: He is ill-appearing.   HENT:      Head: Normocephalic and atraumatic. No contusion.      Right Ear: External ear normal.      Left Ear: External ear normal.      Nose: Nose normal.      Mouth/Throat:      Pharynx: No oropharyngeal exudate.   Eyes:      General:         Right eye: No discharge.         Left eye: No discharge.      Pupils: Pupils are equal, round, and reactive to light.   Neck:      Musculoskeletal: No neck rigidity or muscular tenderness.   Cardiovascular:      Rate and Rhythm: Normal rate and regular rhythm.      Heart sounds: No murmur. No friction rub. No gallop.    Pulmonary:      Effort: Pulmonary effort is normal.      Breath sounds: No wheezing or rhonchi.   Abdominal:      General: Bowel sounds are normal. There is no distension.      Palpations: Abdomen is soft.      Tenderness: There is no abdominal tenderness. There is no rebound.   Musculoskeletal: Normal range of motion.         General: No swelling or tenderness.   Skin:     General: Skin is warm and dry.      Coloration: Skin is not jaundiced.   Neurological:      General: No focal deficit present.      Mental Status: He is alert and oriented to person, place, and time.      Cranial Nerves: No cranial nerve deficit.      Sensory: No sensory deficit.       Comments: Following commands.  He knows that he is in the hospital, he knows Mae is the president of [place] and this is 2020.  Motor 5/5 in all 4 extremities.   Psychiatric:         Mood and Affect: Mood is anxious.         Fluids  Date 09/24/20 0700 - 09/25/20 0659   Shift 6235-2609 0603-6693 2717-8767 24 Hour Total   INTAKE   Shift Total       OUTPUT   Urine 300   300   Shift Total 300   300   Weight (kg) 83.3 83.3 83.3 83.3       Laboratory  Recent Labs     09/22/20  0330 09/23/20  0440   WBC 9.1 9.1   RBC 3.77* 3.67*   HEMOGLOBIN 10.6* 10.2*   HEMATOCRIT 33.0* 32.6*   MCV 87.5 88.8   MCH 28.1 27.8   MCHC 32.1* 31.3*   RDW 45.0 45.3   PLATELETCT 281 279   MPV 9.9 10.4     Recent Labs     09/22/20  0330 09/23/20  0440   SODIUM 147* 142   POTASSIUM 4.2 4.4   CHLORIDE 116* 114*   CO2 19* 19*   GLUCOSE 118* 119*   BUN 18 18   CREATININE 1.91* 1.60*   CALCIUM 9.1 8.9                     Imaging  US-RENAL   Final Result      1.  Bilateral hydronephrosis and hydroureter      2.  Marked post void residual in the bladder of 1078 mL      3.  This combination of findings is consistent with urinary retention      No follow up imaging is recommended per consensus guidelines of the 2019 ACR Incidental Findings Committee for probably benign incidental simple appearing renal cystic lesion(s) based on imaging criteria.      DX-CHEST-PORTABLE (1 VIEW)   Final Result      No acute cardiopulmonary abnormality.          Assessment/Plan  Alcohol withdrawal (HCC)- (present on admission)  Assessment & Plan  Continue CIWA protocol with Ativan.  Continuous cardiac monitoring  Monitor electrolytes and replace accordingly, particularly magnesium, potassium and phosphorus  Fall, seizure, aspiration precautions.  Thiamine folic acid and multivitamin.  Counseling was mentally clear.     Hypertensive urgency  Assessment & Plan  Admitted with hypertensive urgency, resolved   Continue amlodipine and lisinopril with hold  parameters.  IV labetalol and hydralazine PRN with parameters.    SUKHWINDER (acute kidney injury) (HCC)- (present on admission)  Assessment & Plan  Mostly due to dehydration   Avoid nephrotoxins, monitor inputs and outputs  He found to have urinary retention on ultrasound renal.  Saavedra in place.    Anemia, unspecified- (present on admission)  Assessment & Plan  No evidence of gross bleeding.  Continue to monitor transfuse for hemoglobin of less than or equal to 7.  Age-appropriate cancer screening has recommended   TSH and B12 are within normal limits per  Ordered iron panel.    Leukocytosis  Assessment & Plan  Likely reactive severe dehydration, hypertensive urgency   No focal sign of infection at this point   Now resolved.    Hypokalemia  Assessment & Plan  Continue to monitor in place as needed.    Uncontrolled diabetes mellitus (HCC)- (present on admission)  Assessment & Plan  With hyperglycemia ~720s  Glycated hemoglobin 9.5% nearly year ago  Long & short acting insulin, glargine increased 9/21/2020   This morning Accu-Chek showed blood glucose of 184  Continue to monitor closely.      Hypertension- (present on admission)  Assessment & Plan  Admitted with hypertensive urgency  Continue amlodipine and lisinopril with hold parameters.  PRN with hydralazine and IV labetalol with parameters.    Acute urinary obstruction  Assessment & Plan  Found to have acute urine obstruction on ultrasound renal.  Saavedra in place   Continue Flomax.  I discussed plan of care with bedside RN.  DVT prophylaxis: SCDs as patient has hematuria

## 2020-09-25 VITALS
OXYGEN SATURATION: 96 % | HEIGHT: 70 IN | RESPIRATION RATE: 18 BRPM | DIASTOLIC BLOOD PRESSURE: 77 MMHG | BODY MASS INDEX: 26.29 KG/M2 | SYSTOLIC BLOOD PRESSURE: 117 MMHG | HEART RATE: 100 BPM | WEIGHT: 183.64 LBS | TEMPERATURE: 96.8 F

## 2020-09-25 LAB
ALBUMIN SERPL BCP-MCNC: 2.9 G/DL (ref 3.2–4.9)
ALBUMIN/GLOB SERPL: 1 G/DL
ALP SERPL-CCNC: 108 U/L (ref 30–99)
ALT SERPL-CCNC: 15 U/L (ref 2–50)
ANION GAP SERPL CALC-SCNC: 9 MMOL/L (ref 7–16)
AST SERPL-CCNC: 13 U/L (ref 12–45)
BILIRUB SERPL-MCNC: 0.2 MG/DL (ref 0.1–1.5)
BUN SERPL-MCNC: 23 MG/DL (ref 8–22)
CALCIUM SERPL-MCNC: 8.8 MG/DL (ref 8.5–10.5)
CHLORIDE SERPL-SCNC: 110 MMOL/L (ref 96–112)
CO2 SERPL-SCNC: 20 MMOL/L (ref 20–33)
CREAT SERPL-MCNC: 1.93 MG/DL (ref 0.5–1.4)
ERYTHROCYTE [DISTWIDTH] IN BLOOD BY AUTOMATED COUNT: 43.2 FL (ref 35.9–50)
GLOBULIN SER CALC-MCNC: 3 G/DL (ref 1.9–3.5)
GLUCOSE BLD-MCNC: 164 MG/DL (ref 65–99)
GLUCOSE BLD-MCNC: 231 MG/DL (ref 65–99)
GLUCOSE BLD-MCNC: 251 MG/DL (ref 65–99)
GLUCOSE BLD-MCNC: 255 MG/DL (ref 65–99)
GLUCOSE BLD-MCNC: 294 MG/DL (ref 65–99)
GLUCOSE SERPL-MCNC: 257 MG/DL (ref 65–99)
HCT VFR BLD AUTO: 30.1 % (ref 42–52)
HGB BLD-MCNC: 9.8 G/DL (ref 14–18)
HGB RETIC QN AUTO: 25.9 PG/CELL (ref 29–35)
IMM RETICS NFR: 10.7 % (ref 9.3–17.4)
IRON SATN MFR SERPL: 23 % (ref 15–55)
IRON SERPL-MCNC: 28 UG/DL (ref 50–180)
MAGNESIUM SERPL-MCNC: 1.5 MG/DL (ref 1.5–2.5)
MCH RBC QN AUTO: 27.8 PG (ref 27–33)
MCHC RBC AUTO-ENTMCNC: 32.6 G/DL (ref 33.7–35.3)
MCV RBC AUTO: 85.3 FL (ref 81.4–97.8)
PLATELET # BLD AUTO: 337 K/UL (ref 164–446)
PMV BLD AUTO: 10.2 FL (ref 9–12.9)
POTASSIUM SERPL-SCNC: 4 MMOL/L (ref 3.6–5.5)
PROT SERPL-MCNC: 5.9 G/DL (ref 6–8.2)
RBC # BLD AUTO: 3.53 M/UL (ref 4.7–6.1)
RETICS # AUTO: 0.05 M/UL (ref 0.04–0.06)
RETICS/RBC NFR: 1.3 % (ref 0.8–2.1)
SODIUM SERPL-SCNC: 139 MMOL/L (ref 135–145)
TIBC SERPL-MCNC: 122 UG/DL (ref 250–450)
UIBC SERPL-MCNC: 94 UG/DL (ref 110–370)
WBC # BLD AUTO: 6.3 K/UL (ref 4.8–10.8)

## 2020-09-25 PROCEDURE — 700102 HCHG RX REV CODE 250 W/ 637 OVERRIDE(OP): Performed by: INTERNAL MEDICINE

## 2020-09-25 PROCEDURE — 82962 GLUCOSE BLOOD TEST: CPT | Mod: 91

## 2020-09-25 PROCEDURE — 85046 RETICYTE/HGB CONCENTRATE: CPT

## 2020-09-25 PROCEDURE — 83540 ASSAY OF IRON: CPT

## 2020-09-25 PROCEDURE — A9270 NON-COVERED ITEM OR SERVICE: HCPCS | Performed by: INTERNAL MEDICINE

## 2020-09-25 PROCEDURE — 97535 SELF CARE MNGMENT TRAINING: CPT

## 2020-09-25 PROCEDURE — 83735 ASSAY OF MAGNESIUM: CPT

## 2020-09-25 PROCEDURE — 83550 IRON BINDING TEST: CPT

## 2020-09-25 PROCEDURE — 85027 COMPLETE CBC AUTOMATED: CPT

## 2020-09-25 PROCEDURE — 36415 COLL VENOUS BLD VENIPUNCTURE: CPT

## 2020-09-25 PROCEDURE — 97530 THERAPEUTIC ACTIVITIES: CPT

## 2020-09-25 PROCEDURE — 80053 COMPREHEN METABOLIC PANEL: CPT

## 2020-09-25 PROCEDURE — 51798 US URINE CAPACITY MEASURE: CPT

## 2020-09-25 RX ORDER — INSULIN GLARGINE 100 [IU]/ML
30 INJECTION, SOLUTION SUBCUTANEOUS
Status: DISCONTINUED | OUTPATIENT
Start: 2020-09-26 | End: 2020-09-25 | Stop reason: HOSPADM

## 2020-09-25 RX ADMIN — LISINOPRIL 40 MG: 20 TABLET ORAL at 06:12

## 2020-09-25 RX ADMIN — TAMSULOSIN HYDROCHLORIDE 0.4 MG: 0.4 CAPSULE ORAL at 08:33

## 2020-09-25 RX ADMIN — AMLODIPINE BESYLATE 10 MG: 10 TABLET ORAL at 06:12

## 2020-09-25 RX ADMIN — THERA TABS 1 TABLET: TAB at 06:12

## 2020-09-25 RX ADMIN — ATORVASTATIN CALCIUM 20 MG: 20 TABLET, FILM COATED ORAL at 06:12

## 2020-09-25 RX ADMIN — Medication 100 MG: at 06:12

## 2020-09-25 RX ADMIN — FOLIC ACID 1 MG: 1 TABLET ORAL at 06:12

## 2020-09-25 RX ADMIN — INSULIN GLARGINE 20 UNITS: 100 INJECTION, SOLUTION SUBCUTANEOUS at 06:39

## 2020-09-25 ASSESSMENT — LIFESTYLE VARIABLES
ANXIETY: MILDLY ANXIOUS
HEADACHE, FULLNESS IN HEAD: NOT PRESENT
HEADACHE, FULLNESS IN HEAD: NOT PRESENT
VISUAL DISTURBANCES: NOT PRESENT
PAROXYSMAL SWEATS: BARELY PERCEPTIBLE SWEATING. PALMS MOIST
ORIENTATION AND CLOUDING OF SENSORIUM: ORIENTED AND CAN DO SERIAL ADDITIONS
ORIENTATION AND CLOUDING OF SENSORIUM: ORIENTED AND CAN DO SERIAL ADDITIONS
AUDITORY DISTURBANCES: NOT PRESENT
NAUSEA AND VOMITING: NO NAUSEA AND NO VOMITING
TREMOR: TREMOR NOT VISIBLE BUT CAN BE FELT, FINGERTIP TO FINGERTIP
NAUSEA AND VOMITING: NO NAUSEA AND NO VOMITING
AGITATION: SOMEWHAT MORE THAN NORMAL ACTIVITY
TOTAL SCORE: 4
AUDITORY DISTURBANCES: NOT PRESENT
HEADACHE, FULLNESS IN HEAD: NOT PRESENT
AGITATION: SOMEWHAT MORE THAN NORMAL ACTIVITY
PAROXYSMAL SWEATS: NO SWEAT VISIBLE
NAUSEA AND VOMITING: NO NAUSEA AND NO VOMITING
VISUAL DISTURBANCES: NOT PRESENT
NAUSEA AND VOMITING: NO NAUSEA AND NO VOMITING
TREMOR: TREMOR NOT VISIBLE BUT CAN BE FELT, FINGERTIP TO FINGERTIP
ANXIETY: MILDLY ANXIOUS
AUDITORY DISTURBANCES: NOT PRESENT
VISUAL DISTURBANCES: NOT PRESENT
TOTAL SCORE: 4
TREMOR: TREMOR NOT VISIBLE BUT CAN BE FELT, FINGERTIP TO FINGERTIP
VISUAL DISTURBANCES: NOT PRESENT
AUDITORY DISTURBANCES: NOT PRESENT
PAROXYSMAL SWEATS: NO SWEAT VISIBLE
HEADACHE, FULLNESS IN HEAD: NOT PRESENT
VISUAL DISTURBANCES: NOT PRESENT
ANXIETY: MILDLY ANXIOUS
AGITATION: SOMEWHAT MORE THAN NORMAL ACTIVITY
PAROXYSMAL SWEATS: BARELY PERCEPTIBLE SWEATING. PALMS MOIST
AGITATION: SOMEWHAT MORE THAN NORMAL ACTIVITY
ORIENTATION AND CLOUDING OF SENSORIUM: CANNOT DO SERIAL ADDITIONS OR IS UNCERTAIN ABOUT DATE
NAUSEA AND VOMITING: NO NAUSEA AND NO VOMITING
HEADACHE, FULLNESS IN HEAD: NOT PRESENT
ORIENTATION AND CLOUDING OF SENSORIUM: CANNOT DO SERIAL ADDITIONS OR IS UNCERTAIN ABOUT DATE
TOTAL SCORE: 4
PAROXYSMAL SWEATS: NO SWEAT VISIBLE
TREMOR: TREMOR NOT VISIBLE BUT CAN BE FELT, FINGERTIP TO FINGERTIP
AGITATION: SOMEWHAT MORE THAN NORMAL ACTIVITY
AUDITORY DISTURBANCES: NOT PRESENT
ANXIETY: MILDLY ANXIOUS
ANXIETY: MILDLY ANXIOUS
TREMOR: TREMOR NOT VISIBLE BUT CAN BE FELT, FINGERTIP TO FINGERTIP
ORIENTATION AND CLOUDING OF SENSORIUM: CANNOT DO SERIAL ADDITIONS OR IS UNCERTAIN ABOUT DATE

## 2020-09-25 ASSESSMENT — ENCOUNTER SYMPTOMS
DIAPHORESIS: 0
NAUSEA: 0
DIZZINESS: 0
HALLUCINATIONS: 0
ABDOMINAL PAIN: 0
HEADACHES: 0
WHEEZING: 0
SEIZURES: 0
SHORTNESS OF BREATH: 0
PALPITATIONS: 0
DOUBLE VISION: 0
FEVER: 0
NERVOUS/ANXIOUS: 0
TREMORS: 0
BLURRED VISION: 0
VOMITING: 0

## 2020-09-25 ASSESSMENT — GAIT ASSESSMENTS
ASSISTIVE DEVICE: FRONT WHEEL WALKER
DISTANCE (FEET): 35
DEVIATION: BRADYKINETIC;SHUFFLED GAIT;DECREASED HEEL STRIKE;DECREASED TOE OFF;OTHER (COMMENT)
GAIT LEVEL OF ASSIST: MINIMAL ASSIST

## 2020-09-25 ASSESSMENT — COGNITIVE AND FUNCTIONAL STATUS - GENERAL
EATING MEALS: A LITTLE
DRESSING REGULAR UPPER BODY CLOTHING: A LITTLE
TURNING FROM BACK TO SIDE WHILE IN FLAT BAD: A LOT
SUGGESTED CMS G CODE MODIFIER MOBILITY: CL
TOILETING: A LOT
MOBILITY SCORE: 11
SUGGESTED CMS G CODE MODIFIER DAILY ACTIVITY: CK
DAILY ACTIVITIY SCORE: 15
CLIMB 3 TO 5 STEPS WITH RAILING: A LOT
HELP NEEDED FOR BATHING: A LOT
STANDING UP FROM CHAIR USING ARMS: A LOT
PERSONAL GROOMING: A LITTLE
MOVING TO AND FROM BED TO CHAIR: UNABLE
WALKING IN HOSPITAL ROOM: A LITTLE
DRESSING REGULAR LOWER BODY CLOTHING: A LOT
MOVING FROM LYING ON BACK TO SITTING ON SIDE OF FLAT BED: UNABLE

## 2020-09-25 NOTE — PROGRESS NOTES
2 RN skin check complete with KASSANDRA Garsia.   Devices in place N/A.  Confirmed pressure ulcers found on N/A.  The following interventions in place :    Skin is intact  Sacrum is red, blanching.   Heels are dry, cracked. 4th toe missing on R foot.

## 2020-09-25 NOTE — PROGRESS NOTES
Assumed care of patient at bedside report from NOC RN. Updated on POC. Patient currently A & O x 3 disoriented to event; on room air; up x2 assist; with complaints of bladder discomfort. Patient requesting catheter be removed, will discuss with MD during rounds. Call light within reach. Whiteboard updated. Fall precautions in place. Bed locked and in lowest position. All questions answered. No other needs indicated at this time.

## 2020-09-25 NOTE — CARE PLAN
Problem: Communication  Goal: The ability to communicate needs accurately and effectively will improve  Outcome: PROGRESSING AS EXPECTED     Problem: Knowledge Deficit  Goal: Knowledge of disease process/condition, treatment plan, diagnostic tests, and medications will improve  Outcome: PROGRESSING AS EXPECTED     Problem: Psychosocial Needs:  Goal: Level of anxiety will decrease  Outcome: PROGRESSING SLOWER THAN EXPECTED

## 2020-09-25 NOTE — PROGRESS NOTES
Monitor Summary  Rhythm: SR  Rate: 82-95  Ectopy: Occasional PAC's and PVC's.   .14 / .10 / .40

## 2020-09-25 NOTE — CARE PLAN
Problem: Communication  Goal: The ability to communicate needs accurately and effectively will improve  Outcome: PROGRESSING AS EXPECTED     Problem: Safety  Goal: Will remain free from injury  Outcome: PROGRESSING AS EXPECTED  Goal: Will remain free from falls  Outcome: PROGRESSING AS EXPECTED     Problem: Infection  Goal: Will remain free from infection  Outcome: PROGRESSING AS EXPECTED     Problem: Respiratory:  Goal: Respiratory status will improve  Outcome: PROGRESSING AS EXPECTED

## 2020-09-25 NOTE — DISCHARGE PLANNING
Anticipated Discharge Disposition: TBD    Action: Patient discussed with Dr. Ridley and she would like PT and OT to reeval patient as he has not been seen since 9/22. Patient may be medically cleared in 2 days.    Barriers to Discharge: PT/OT updated evals, medical clearance    Plan: Case coordination to f/u with treatment team for discharge planning needs

## 2020-09-25 NOTE — PROGRESS NOTES
Patient having difficulty urinating after multiple attempts. Notified MD. Bladder scan ordered stat.

## 2020-09-25 NOTE — DIETARY
Nutrition Services: Attempted to follow up for Type 2 Diabetes education. Pt stated it was not a good time and declined education at this time. Will continue to monitor and follow up re: education as appropriate.

## 2020-09-25 NOTE — PROGRESS NOTES
Pt brought up to floor by CIC RN and transport. Bedside report received. Pt ambulated with the assistance of two staff members to bed. Tele box placed on pt and monitor room notified; . Call light and personal belongings within reach. Bed locked and in lowest position. Strip alarm on. Pt demonstrated call light use.

## 2020-09-25 NOTE — PROGRESS NOTES
[facility] Medicine Daily Progress Note    Date of Service  9/25/2020    Chief Complaint  64 y.o. male admitted 9/19/2020 with hyperglycemia     Hospital Course    64 y.o. male with past medical history of poorly controlled diabetes and history of alcohol abuse and drug use presented to the hospital on 9/19/2020 with DKA.  He was admitted to ICU for further management of DKA and alcohol withdrawal.  Initially he had an elevated anion gap with bicarb of 13, and was started on insulin drip and eventually transitioned to Lantus and sliding scale..  For alcohol withdrawal and he was started on Precedex.  On 9/21/2020 patient was found to have urinary retention. Ultrasound on 9/21/2020 showed bilateral hydronephrosis and hydroureter with postvoid residual volume of 107 8 mL consistent with urinary retention.  He was started on tamsulosin and urinary catheter was inserted.      Today intensivist Dr. Carcamo requested his transfer from ICU to floor.  This morning he is hemodynamically stable.  I ordered lab work-up for this morning which include CBC and BMP.  His last anion gap was 9.0 from yesterday.     Currently he is on Lantus 20 units and insulin sliding scale.  For high blood pressure he is on amlodipine and lisinopril.  He is on p.o. vitamins CIWA protocol with Ativan for alcohol withdrawal      Interval Problem Update  Patient was transferred from ICU to telemetry floor following overall improvement on 9/24/2020.  Examined him this morning and he was alert and coherent.  He is complaining of pain from urinary catheter, but denied fever, chills, nausea, vomiting, suprapubic pain.  He denied any symptoms of hallucinations, tremors, anxiety from alcohol withdrawal and is still on CIWA protocol but did not require any Ativan overnight.  Nursing reported no overnight events.  Currently is on Lantus 20 and insulin sliding scale but has been requiring increased amount of sliding scale  overnight.    Consultants/Specialty  None    Code Status  Full Code    Disposition  Patient will remain on telemetry floor for further monitoring, anticipate DC in 24 to 48 hours.    Review of Systems  Review of Systems   Constitutional: Negative for diaphoresis, fever and malaise/fatigue.   Eyes: Negative for blurred vision and double vision.   Respiratory: Negative for shortness of breath and wheezing.    Cardiovascular: Negative for chest pain and palpitations.   Gastrointestinal: Negative for abdominal pain, nausea and vomiting.   Genitourinary: Positive for dysuria.   Neurological: Negative for dizziness, tremors, seizures and headaches.   Psychiatric/Behavioral: Negative for hallucinations. The patient is not nervous/anxious.         Physical Exam  Temp:  [36.1 °C (97 °F)-37.2 °C (99 °F)] 37.2 °C (99 °F)  Pulse:  [] 85  Resp:  [12-20] 18  BP: (114-132)/(65-90) 132/90  SpO2:  [93 %-100 %] 93 %    Physical Exam  HENT:      Mouth/Throat:      Mouth: Mucous membranes are moist.   Eyes:      Conjunctiva/sclera: Conjunctivae normal.   Pulmonary:      Breath sounds: No wheezing, rhonchi or rales.   Abdominal:      General: Bowel sounds are normal. There is no distension.      Palpations: Abdomen is soft.      Tenderness: There is no abdominal tenderness.   Genitourinary:     Comments: No suprapubic tenderness  Skin:     Coloration: Skin is not jaundiced or pale.   Neurological:      Mental Status: He is alert and oriented to person, place, and time.      Sensory: No sensory deficit.      Motor: No weakness.         Fluids    Intake/Output Summary (Last 24 hours) at 9/25/2020 1004  Last data filed at 9/25/2020 0800  Gross per 24 hour   Intake 590 ml   Output 2125 ml   Net -1535 ml       Laboratory  Recent Labs     09/23/20  0440 09/24/20  1125 09/25/20  0549   WBC 9.1 8.4 6.3   RBC 3.67* 3.83* 3.53*   HEMOGLOBIN 10.2* 10.5* 9.8*   HEMATOCRIT 32.6* 33.3* 30.1*   MCV 88.8 86.9 85.3   MCH 27.8 27.4 27.8   MCHC  31.3* 31.5* 32.6*   RDW 45.3 43.8 43.2   PLATELETCT 279 339 337   MPV 10.4 10.5 10.2     Recent Labs     09/23/20  0440 09/24/20  1125 09/25/20  0549   SODIUM 142 141 139   POTASSIUM 4.4 3.8 4.0   CHLORIDE 114* 110 110   CO2 19* 20 20   GLUCOSE 119* 260* 257*   BUN 18 22 23*   CREATININE 1.60* 1.73* 1.93*   CALCIUM 8.9 9.2 8.8                   Imaging  US-RENAL   Final Result      1.  Bilateral hydronephrosis and hydroureter      2.  Marked post void residual in the bladder of 1078 mL      3.  This combination of findings is consistent with urinary retention      No follow up imaging is recommended per consensus guidelines of the 2019 ACR Incidental Findings Committee for probably benign incidental simple appearing renal cystic lesion(s) based on imaging criteria.      DX-CHEST-PORTABLE (1 VIEW)   Final Result      No acute cardiopulmonary abnormality.             Assessment/Plan  Acute urinary obstruction  Assessment & Plan  Found to have acute urine obstruction on ultrasound renal.  Has been managed with Saavedra and Flomax since 9/21/2020.  Patient is complaining of pain in catheter.  If patient continues to have urinary retention, may require reinsertion of Saavedra and possibly urology consult.  We will DC Saavedra and attempt trial without catheter  Monitor urine output closely for signs of retention  Continue Flomax      Alcohol withdrawal (HCC)- (present on admission)  Assessment & Plan  Continue CIWA protocol with Ativan.  Continuous cardiac monitoring  Monitor electrolytes and replace accordingly, particularly magnesium, potassium and phosphorus  Fall, seizure, aspiration precautions.  Thiamine folic acid and multivitamin.  Patient will need close follow-up in the outpatient for alcohol cessation.    SUKHWINDER (acute kidney injury) (HCC)- (present on admission)  Assessment & Plan  Creatinine still not improving, however good urine output last 24 hours.  We will continue to monitor and avoid nephrotoxins, monitor inputs  and outputs      Hypertensive urgency  Assessment & Plan  Admitted with hypertensive urgency, resolved   Continue amlodipine and lisinopril with hold parameters.  IV labetalol and hydralazine PRN with parameters.    Anemia, unspecified- (present on admission)  Assessment & Plan  No evidence of gross bleeding.  Continue to monitor transfuse for hemoglobin of less than or equal to 7.  Age-appropriate cancer screening has recommended   TSH and B12 are within normal limits per  Ordered iron panel.    Leukocytosis  Assessment & Plan  Likely reactive severe dehydration, hypertensive urgency   No focal sign of infection at this point   Now resolved.    Hypokalemia  Assessment & Plan  Continue to monitor in place as needed.    Uncontrolled diabetes mellitus (HCC)- (present on admission)  Assessment & Plan  Patient still with increased glucose on Accu-Chek in a.m. > 200s.  We will increase patient's glargine from 20 U to 30-year-old and continue to monitor closely, adjust insulin as needed  Continue with sliding scale      Hypertension- (present on admission)  Assessment & Plan  Admitted with hypertensive urgency, which has resolved  Continue amlodipine and lisinopril with hold parameters.  PRN with hydralazine and IV labetalol with parameters.       VTE prophylaxis: none

## 2020-09-25 NOTE — THERAPY
"Occupational Therapy  Daily Treatment     Patient Name: Louis Orourke  Age:  64 y.o., Sex:  male  Medical Record #: [id number]  Today's Date: 9/25/2020     Precautions  Precautions: (P) Fall Risk    Assessment    Pt seen for OT tx session. Pt is making functional gains towards acute OT goals, POC update to reflect pt's progress. Pt primarily impaired by cognition and balance. Pt had soiled bed upon entry, txf'd pt to chair for linen change. Once in chair pt requested to go the bathroom. In bathroom pt had multiple sonya LOB's requiring physical assist to correct. Pt required verbal cues for sequencing clean up post BM. Will continue to follow for Acute OT services.     Plan    Continue current treatment plan.    DC Equipment Recommendations: (P) Unable to determine at this time  Discharge Recommendations: (P) Recommend post-acute placement for additional occupational therapy services prior to discharge home    Subjective    \"Can I change in the bathroom?\"     Objective       09/25/20 1027   Total Time Spent   Total Time Spent (Mins) 28   Treatment Charges   Charges Yes   OT Self Care / ADL 2   Precautions   Precautions Fall Risk   Vitals   O2 (LPM) 0   O2 Delivery Device None - Room Air   Pain 0 - 10 Group   Therapist Pain Assessment Post Activity Pain Same as Prior to Activity;Nurse Notified  (no c/o or indications of pain)   Cognition    Cognition / Consciousness X   Speech/ Communication Delayed Responses   Level of Consciousness Alert   Ability To Follow Commands 1 Step   Safety Awareness Impaired;Impulsive   Initiation Impaired   Comments increased processing time, very poor insight into deficits, overall cooperative.   Balance   Sitting Balance (Static) Fair -   Sitting Balance (Dynamic) Poor +   Standing Balance (Static) Poor +   Standing Balance (Dynamic) Poor   Weight Shift Sitting Fair   Weight Shift Standing Poor   Skilled Intervention Compensatory Strategies;Tactile Cuing;Verbal Cuing   Comments " improved w/ FWW, multiple sonya LOB's that required physical assist to correct   Bed Mobility    Supine to Sit Minimal Assist   Sit to Supine Minimal Assist   Scooting Supervised   Activities of Daily Living   Grooming Minimal Assist;Standing  (washed hands at sink)   Upper Body Dressing Minimal Assist   Lower Body Dressing Maximal Assist   Toileting Moderate Assist  (to perform pericare post BM)   Skilled Intervention Tactile Cuing;Verbal Cuing;Facilitation   How much help from another person does the patient currently need...   Putting on and taking off regular lower body clothing? 2   Bathing (including washing, rinsing, and drying)? 2   Toileting, which includes using a toilet, bedpan, or urinal? 2   Putting on and taking off regular upper body clothing? 3   Taking care of personal grooming such as brushing teeth? 3   Eating meals? 3   6 Clicks Daily Activity Score 15   Functional Mobility   Sit to Stand Moderate Assist   Bed, Chair, Wheelchair Transfer Moderate Assist   Toilet Transfers Moderate Assist   Mobility within room and bathroom w/ HHA and FWW   Skilled Intervention Tactile Cuing;Verbal Cuing;Facilitation;Postural Facilitation   Comments increased assist for safety and balance   Activity Tolerance   Sitting in Chair 5 min   (on toilet and in chair)   Sitting Edge of Bed 6 min   Standing 8 min   Patient / Family Goals   Patient / Family Goal #1 To leave   Short Term Goals   Short Term Goal # 1 Pt will complete toilet transfer using BSC is needed with Alex by discharge.   Goal Outcome # 1 Progressing as expected   Short Term Goal # 2 Pt will complete grooming/hygiene with Alex by discharge.   Goal Outcome # 2 Goal met, new goal added   Short Term Goal # 2 B  Pt will demo standing grooming w/ SPV   Goal Outcome # 2 B Goal not met   Short Term Goal # 3 Pt will complete LE dressing with Alex by discharge.   Goal Outcome # 3 Goal not met   Education Group   Role of Occupational Therapist Patient Response  Patient;Acceptance;Explanation;Demonstration;Verbal Demonstration;Action Demonstration   Anticipated Discharge Equipment and Recommendations   DC Equipment Recommendations Unable to determine at this time   Discharge Recommendations Recommend post-acute placement for additional occupational therapy services prior to discharge home   Interdisciplinary Plan of Care Collaboration   IDT Collaboration with  Nursing   Patient Position at End of Therapy In Bed;Bed Alarm On;Call Light within Reach;Tray Table within Reach;Phone within Reach   Collaboration Comments report given   Session Information   Date / Session Number  9/25, 2 (2/3, 9/28)   Priority 2

## 2020-09-25 NOTE — DISCHARGE PLANNING
RN CM attempted to meet with patient for assessment. Patient currently trying to use the urinal. Will check back at a later time if able to.

## 2020-09-25 NOTE — PROGRESS NOTES
Pulmonary/Critical Care Medicine   Progress Note    Date of service: 9/24/2020  Time: 8:49 AM    Patient is doing well, requiring minimal lorazepam and no precedex gtt. Patient stable to be transferred out of ICU today to tele.  I have discussed this case with hospitalist Dr. Carpenter, he will assign a hospitalist to assume care at this time. [facility] will sign off. Please call with any questions.    TAYE Allen Jr.O.  [facility]  8:51 AM

## 2020-09-26 PROCEDURE — 99239 HOSP IP/OBS DSCHRG MGMT >30: CPT | Performed by: INTERNAL MEDICINE

## 2020-09-26 NOTE — DISCHARGE SUMMARY
"Discharge Summary    CHIEF COMPLAINT ON ADMISSION  Chief Complaint   Patient presents with   • Hyperglycemia   • ALOC       Reason for Admission  ems     Admission Date  9/19/2020    CODE STATUS  Prior    HPI & HOSPITAL COURSE  64 y.o. male with past medical history of poorly controlled diabetes and history of alcohol abuse and drug use presented to the hospital on 9/19/2020 with DKA.  He was admitted to ICU for further management of DKA and alcohol withdrawal.  Initially he had an elevated anion gap with bicarb of 13, and was started on insulin drip and eventually transitioned to Lantus and sliding scale..  For alcohol withdrawal and he was started on Precedex.  On 9/21/2020 patient was found to have urinary retention. Ultrasound on 9/21/2020 showed bilateral hydronephrosis and hydroureter with postvoid residual volume of 107 8 mL consistent with urinary retention.  He was started on tamsulosin and urinary catheter was inserted.     He was transfered from ICU to floor on 9/24/2020. He was managed on Lantus 20 units and insulin sliding scale.  For high blood pressure he is on amlodipine and lisinopril.  He was on p.o. vitamins CIWA protocol with Ativan for alcohol withdrawal.     Urinary catheter was discontinued on 9/25/2020 due to discomfort, had been placed for 5 days.  Patient still exhibited difficulty urinating after catheter was discontinued and required straight cath on 2 occasions due to urinary retention >400ml.  Also of note patient kidney status continued to decline\" investigation.  On 9/25/2020 patient left AMA at approximately 2030h, which was overseen by night shift MD. patient has a high probability of readmission due to ongoing health problems.    Therefore, he is discharged in guarded condition against medcial advice.    The patient met 2-midnight criteria for an inpatient stay at the time of discharge.    Discharge Date  9/25/2020    FOLLOW UP ITEMS POST DISCHARGE  Patient will need to have close " follow-up with PCP in the outpatient.    DISCHARGE DIAGNOSES  Active Problems:    SUKHWINDER (acute kidney injury) (HCC) POA: Yes      Overview: Patient was admitted with SUKHWINDER, mostly due to dehydration.  He was also       found to have urinary retention on renal ultrasound and has been managed       with tamsulosin and Saavedra.    Alcohol withdrawal (HCC) POA: Yes    Acute urinary obstruction POA: Clinically Undetermined    Uncontrolled diabetes mellitus (HCC) POA: Yes      Overview: With hyperglycemia ~720s      Glycated hemoglobin 9.5% nearly year ago      Long & short acting insulin, glargine increased 9/21/2020     Hypokalemia POA: Unknown    Leukocytosis POA: Unknown    Anemia, unspecified POA: Yes    Hypertensive urgency POA: Unknown    Hypertension POA: Yes      Overview: Reportedly HTN for a number of years and no medication for HTN for some       time.  Resolved Problems:    * No resolved hospital problems. *      FOLLOW UP  No future appointments.  ZEUS Hernandez  [address] [id number] [phone]          MEDICATIONS ON DISCHARGE     Medication List      ASK your doctor about these medications      Instructions   amLODIPine 5 MG Tabs  Commonly known as: NORVASC   TAKE 1 TABLET ORALLY ONCE DAILY     ammonium lactate 12 % Lotn  Commonly known as: LAC-HYDRIN   Use 1/4 ribbon to each foot daily to decrease dryness and corns/cracks     ARIPiprazole 10 MG Tabs  Commonly known as: ABILIFY   Take 1 Tab by mouth every day.  Dose: 10 mg     atorvastatin 20 MG Tabs  Commonly known as: LIPITOR   Take 1 Tab by mouth every day.  Dose: 20 mg     Cholecalciferol 100 MCG (4000 UT) Caps   Take 1 Capsule by mouth every day.  Dose: 1 Capsule     Dulaglutide 0.75 MG/0.5ML Sopn   Inject 0.75 mg as instructed every Tuesday.  Dose: 0.75 mg     fluticasone 50 MCG/ACT nasal spray  Commonly known as: FLONASE   Spray 1 Spray in nose every day.  Dose: 1 Spray     insulin glargine 100 UNIT/ML Sopn  injection  Commonly known as: Lantus   Inject 80 Units as instructed every evening.  Dose: 80 Units     lisinopril 40 MG tablet  Commonly known as: PRINIVIL   Take 1 Tab by mouth every day.  Dose: 40 mg     loratadine 10 MG Tabs  Commonly known as: CLARITIN   Take 1 Tab by mouth every day.  Dose: 10 mg     metformin 1000 MG tablet  Commonly known as: GLUCOPHAGE   Take 1 Tab by mouth 2 times a day, with meals.  Dose: 1,000 mg            Allergies  No Known Allergies    DIET  No orders of the defined types were placed in this encounter.      ACTIVITY  As tolerated.  Weight bearing as tolerated    CONSULTATIONS  None    PROCEDURES  None    LABORATORY  Lab Results   Component Value Date    SODIUM 139 09/25/2020    POTASSIUM 4.0 09/25/2020    CHLORIDE 110 09/25/2020    CO2 20 09/25/2020    GLUCOSE 257 (H) 09/25/2020    BUN 23 (H) 09/25/2020    CREATININE 1.93 (H) 09/25/2020    CREATININE 1.4 06/03/2006        Lab Results   Component Value Date    WBC 6.3 09/25/2020    HEMOGLOBIN 9.8 (L) 09/25/2020    HEMATOCRIT 30.1 (L) 09/25/2020    PLATELETCT 337 09/25/2020        Total time of the discharge process exceeds 31 minutes.

## 2020-09-26 NOTE — THERAPY
Physical Therapy   Daily Treatment     Patient Name: Louis Orourke  Age:  64 y.o., Sex:  male  Medical Record #: [id number]  Today's Date: 9/25/2020     Precautions: Fall Risk; EtOH    Assessment    Pt progressing as expected given co-morbidities as well as current cognitive deficits. He essentially required min/mod A with mobility and demonstrates poor carryover within visit with re: education and recs for mechanics/facilitation (though does initially follow appropriately). Anticipate as cognition clears and pt increases OOB activity with both PT and staff, he will continue to progress with mobility. See below for details/recs.     Plan    Continue current treatment plan.    DC Equipment Recommendations:  Unable to determine at this time  Discharge Recommendations: Recommend post-acute placement for additional physical therapy services prior to discharge home       09/25/20 1042   Cognition    Cognition / Consciousness X   Speech/ Communication Delayed Responses   Level of Consciousness Alert   Ability To Follow Commands 1 Step   Safety Awareness Impaired;Impulsive   Initiation Impaired   Comments flat affect; delayed; poor insight into deficits and affect on safety/fall risk; cooperative though limited verbal communication; unaware of soiled self with BM   Balance   Sitting Balance (Static) Fair -   Sitting Balance (Dynamic) Poor +   Standing Balance (Static) Poor +   Standing Balance (Dynamic) Poor   Weight Shift Sitting Fair   Weight Shift Standing Poor   Skilled Intervention Verbal Cuing;Compensatory Strategies;Tactile Cuing   Comments multiple LOB durign ambulation with no AD; slighlty improves with FWW wtih cueing for anterior weightshift to UEs on FWW (however does not demonsterate carryover within visit and needed consistent cueing/facilitation throughout)   Gait Analysis   Gait Level Of Assist Minimal Assist   Assistive Device Front Wheel Walker   Distance (Feet) 35   # of Times Distance was Traveled 1    Deviation Bradykinetic;Shuffled Gait;Decreased Heel Strike;Decreased Toe Off;Other (Comment)  (reciprocal gait; dec clearance; see below)   # of Stairs Climbed 0   Weight Bearing Status no restrictions   Skilled Intervention Compensatory Strategies;Facilitation;Postural Facilitation;Sequencing;Tactile Cuing;Verbal Cuing   Comments tending to maintain hips and knees flexed; poor/inconsistent placement and delayed reactions to perturbations to gait with generally poor safety awareness   Bed Mobility    Supine to Sit Minimal Assist   Sit to Supine Minimal Assist   Skilled Intervention Verbal Cuing;Compensatory Strategies   Functional Mobility   Sit to Stand Moderate Assist   Bed, Chair, Wheelchair Transfer Moderate Assist  (min/mod dep on cueing)   Mobility with FWW   Skilled Intervention Compensatory Strategies;Verbal Cuing;Postural Facilitation;Sequencing;Tactile Cuing;Facilitation   How much difficulty does the patient currently have...   Turning over in bed (including adjusting bedclothes, sheets and blankets)? 2   Sitting down on and standing up from a chair with arms (e.g., wheelchair, bedside commode, etc.) 1   Moving from lying on back to sitting on the side of the bed? 1   How much help from another person does the patient currently need...   Moving to and from a bed to a chair (including a wheelchair)? 2   Need to walk in a hospital room? 3   Climbing 3-5 steps with a railing? 2   6 clicks Mobility Score 11   Activity Tolerance   Sitting in Chair at least 4-5 mins   Sitting Edge of Bed at least 5 mins   Standing at least 6-7 mins   Comments improving lethargy though cog limiting   Short Term Goals    Short Term Goal # 1 pt will perform supine <> sit with Min A in 6 visits   Goal Outcome # 1 Goal met, new goal added   Short Term Goal # 1 B  Pt will be able to perform supine<>sit with HOB flat/no rails with Spv in 6tx to promote fnx progression to I    Goal Outcome  # 1 B Goal not met   Short Term Goal # 2 pt  will perform sit <> stand and functional transfers with LRAD and Mod A to allow for functional mobility in 6 visits   Goal Outcome # 2 Goal met, new goal added   Short Term Goal # 2 B  Pt will be able to perform sit<>stand/transfers with FWW with Spv in 6tx to promote fnx progression to I    Goal Outcome # 2 B Goal not met   Short Term Goal # 3 pt will ambulate > 15 ft with LRAD and Mod A to improve function in 6 visits   Goal Outcome # 3 Goal met, new goal added   Short Term Goal # 3 B Pt will be able to ambulate 150ft with FWW with Spv in 6tx to promote fnx progression to I    Education Group   Education Provided Gait Training;Use of Assistive Device   Role of Physical Therapist Patient Response Patient;Acceptance;Explanation;Verbal Demonstration;Reinforcement Needed;No Learning Evidence   Gait Training Patient Response Patient;Explanation;Verbal Demonstration;Action Demonstration;Reinforcement Needed;No Learning Evidence   Use of Assistive Device Patient Response Patient;Explanation;Verbal Demonstration;Action Demonstration;Reinforcement Needed;No Learning Evidence   Additional Comments pt demonstrates ability to follow initially with cueing for use of FWW/mechanics though reverts to poor mechanics without consitent reinforcement

## 2020-09-26 NOTE — PROGRESS NOTES
Patient bladder scan of 486. Orders to straight cath if over 400. Patient straight cath, removed 450.

## 2020-09-26 NOTE — PROGRESS NOTES
"Patient leaving AMA. Patient stated that he wanted to leave and \"get out\". Patient was educated and discussed the risks and benefits of continuing this hospitilization. Patient is A&Ox4. Charge nurse also discussed risks of leaving with the patient on leaving. Dr. Corral notified of patient leaving AMA. IV and tele box removed. Patient wheeled off unit.   "

## 2020-09-26 NOTE — PROGRESS NOTES
Assumed care of patient. Bedside report, received from Yuki CANNON. Updated POC, call light within reach, and fall precautions in place. Bed locked and and in lowest position. Patient instructed to call for assistance before getting out of bed. All questions answered, no further needs at this time.

## 2020-10-07 NOTE — DOCUMENTATION QUERY
[facility]                                                                       Query Response Note      PATIENT:               HOSSEIN PRINCE  ACCT #:                  [id number]  MRN:                     [id number]  :                      1955  ADMIT DATE:       2020 6:30 PM  DISCH DATE:        2020 9:26 PM  RESPONDING  PROVIDER #:        [id number]           QUERY TEXT:    Alcohol withdrawal is documented in the Medical Record.      Coding of alcohol withdrawal defaults to alcohol dependence.     Coding Clinic instructs coders to query the physician for clarification when only alcohol abuse and withdrawal are both documented in the chart.     Based on clinical findings, risk factors and treatment, please clarify the pattern of the alcohol use.      NOTE:  If an appropriate response is not listed below, please respond with a new note.        The patient's Clinical Indicators include:      Consult  & Progress Notes -, Discharge Summary  Alcohol withdrawal    Consult/Progress Note , Discharge Summary  Alcohol abuse    Clinical Indicators  Hypokalemia  Confusion, intermittent agitation, somnolence  Severe agitated delirium    Treatment:  PO Vitamin  CIWA protocol  Ativan  Restraints  Monitor/replace elcetrolytes  Fall, seizure, aspiration precautions  Counseling  Options provided:   -- Alcohol dependence with withdrawal   -- Alcohol abuse only   -- Unable to determine      Query created by: Tamar Mauricio on 10/2/2020 2:11 AM    RESPONSE TEXT:    Alcohol dependence with withdrawal          Electronically signed by:  SATINDER BRISENO MD 10/7/2020 10:13 AM

## 2021-03-03 DIAGNOSIS — Z23 NEED FOR VACCINATION: ICD-10-CM

## 2022-10-05 NOTE — PROGRESS NOTES
[facility] Medicine Daily Progress Note    Date of Service  8/11/2018    Chief Complaint  62 y.o. male admitted 8/9/2018 with diabetic foot infection.    Interval Problem Update  MRI consistent with 4th and 5th toe osteomyelitis, septic arthritis, and cellulitis  Cont zyvox and unasyn  Pt still resistant to surgery despite extensive discussion of risks and benefits of surgery.  Currently made NPO by limb preservation  Pt threatening to leave AMA    Consultants/Specialty  Infectious disease  Orthopedics    Disposition  Inpatient     Review of Systems  Review of Systems   Constitutional: Negative for chills and fever.   HENT: Negative for congestion, hearing loss and sore throat.    Eyes: Negative for blurred vision and double vision.   Respiratory: Negative for cough, sputum production and shortness of breath.    Cardiovascular: Negative for chest pain and palpitations.   Gastrointestinal: Negative for abdominal pain, heartburn and nausea.   Genitourinary: Negative for dysuria and urgency.   Musculoskeletal: Negative for back pain and falls.   Skin: Negative for itching and rash.        Wound on sole of right foot   Neurological: Negative for dizziness, loss of consciousness and headaches.        Physical Exam  Blood Pressure: 141/87   Temperature: 36.8 °C (98.3 °F)   Pulse: 94   Respiration: 18   Pulse Oximetry: 94 %     Physical Exam   Constitutional: He is oriented to person, place, and time. He appears well-developed and well-nourished.   HENT:   Head: Normocephalic and atraumatic.   Eyes: Pupils are equal, round, and reactive to light.   Cardiovascular: Normal rate and regular rhythm.    Good LLE distal pulse. Unable to palpate RLE dorsalis pedis due to edema   Pulmonary/Chest: Breath sounds normal. No respiratory distress.   Abdominal: Soft. Bowel sounds are normal. He exhibits no distension. There is no tenderness.   Musculoskeletal:   Minimal RLE pedal edema   Neurological: He is alert and oriented to person,  How Severe Is Your Skin Lesion?: mild Has Your Skin Lesion Been Treated?: not been treated place, and time.   Skin:   Wound over sole of base of right 4th toe. No expressed purulent drainage. About 3.5 cm. Surround erythema extending from dorsum of foot to medial aspect of shin. Bluish discoloration of 4th digit.       Fluids    Intake/Output Summary (Last 24 hours) at 08/11/18 1113  Last data filed at 08/11/18 0800   Gross per 24 hour   Intake             1370 ml   Output              900 ml   Net              470 ml       Laboratory  Recent Labs      08/09/18   1854  08/10/18   0936  08/11/18   0418   WBC  17.5*  13.5*  9.7   RBC  4.71  4.45*  4.38*   HEMOGLOBIN  13.2*  12.4*  12.1*   HEMATOCRIT  38.8*  36.1*  36.7*   MCV  82.4  81.1*  83.8   MCH  28.0  27.9  27.6   MCHC  34.0  34.3  33.0*   RDW  40.1  39.0  40.4   PLATELETCT  341  297  330   MPV  9.7  9.9  9.6     Recent Labs      08/09/18   1854  08/10/18   0936  08/11/18   0418   SODIUM  132*  135  140   POTASSIUM  4.2  3.5*  3.6   CHLORIDE  97  103  109   CO2  23  22  23   GLUCOSE  420*  275*  171*   BUN  22  14  15   CREATININE  1.31  0.87  0.88   CALCIUM  9.8  9.0  8.9                   Imaging  DX-FOOT-COMPLETE 3+ RIGHT   Final Result      1.  No acute right foot fracture or dislocation.      2.  Soft tissue swelling surrounding the 4th and 5th toes.      3.  No plain film evidence of acute osteomyelitis.      4.  Degenerative or post inflammatory irregularity of the right 5th MTP joint and IP joint.      5.  Moderate hallux valgus deformity.      MR-FOOT-WITH & W/O RIGHT    (Results Pending)        Assessment/Plan  * Osteomyelitis due to type 2 diabetes mellitus (HCC)   Assessment & Plan    - cont zyvox and unasyn  - ID and ortho consulted, recs appreciated  -  limb preservation evaluation completed  - MRI of right foot showing 4th and 5th toe osteomyelitis, septic arthritis, and cellulitis  - pt resistant to amputation. Have discussed with patient extensively about risks and benefits.          Uncontrolled diabetes mellitus (HCC)   Assessment &  Is This A New Presentation, Or A Follow-Up?: Skin Lesion Plan    - cont lantus 30U qPM, ISS, hypoglycemic protocol, diabetic diet  - A1c is 12  - diabetes education ordered        Hypertension- (present on admission)   Assessment & Plan    continue home Prinivil and monitor  - amlodipine 5mg started        Hyponatremia   Assessment & Plan    - likely due to hyperglycemia, resolved        Hyperlipidemia- (present on admission)   Assessment & Plan    continue home Lipitor.           Is This A New Presentation, Or A Follow-Up?: Growth

## 2025-03-15 NOTE — PROGRESS NOTES
Hospital Medicine Daily Progress Note    Date of Service  9/24/2020    Chief Complaint  64 y.o. male admitted 9/19/2020 with poorly controlled diabetes and alcohol withdrawal    Hospital Course    *64 y.o. male with past medical history of poorly controlled diabetes and history of alcohol abuse and drug use presented to the hospital on 9/19/2020 with DKA.  On presentation he found to have elevated anion gap with bicarb of 13.  He also found to have alcohol withdrawal and he was started on Precedex.  For diabetic ketoacidosis he was started on insulin drip in ER and then transition to Lantus and sliding scale.  Today intensivist Dr. Carcamo requested his transfer from ICU to floor.  This morning he is hemodynamically stable.  I ordered lab work-up for this morning which include CBC and BMP.  His last anion gap was 9.0 from yesterday.     Currently he is on Lantus 20 units and insulin sliding scale.  For high blood pressure he is on amlodipine and lisinopril.  He is on p.o. vitamins CIWA protocol with Ativan for alcohol withdrawal.   .*      Interval Problem Update  I evaluated and examined him at the bedside.  He thinks Saavedra's catheter is a warm.  I explained him regarding importance of Saavedra's catheter.  He knows that he is in the hospital.  I discussed plan of care with bedside RN    Consultants/Specialty  Critical care    Code Status  Full Code    Disposition  To be decided    Review of Systems  Review of Systems   Constitutional: Negative for chills, fever and weight loss.   HENT: Negative for hearing loss and tinnitus.    Eyes: Negative for blurred vision, double vision, photophobia and pain.   Respiratory: Negative for cough, sputum production and shortness of breath.    Cardiovascular: Negative for chest pain, palpitations, orthopnea and leg swelling.   Gastrointestinal: Negative for abdominal pain, constipation, diarrhea, nausea and vomiting.   Genitourinary: Negative for dysuria, frequency and urgency.         Physical Therapy    Visit Type: treatment and discharge  SUBJECTIVE  \"I was expecting to be on machines but I did not at therapy so I don't want to go this time.\" \"I do have a bike at home that I wanted to use.\"   Patient / Family Goal: return home, return to previous functional status and maximize function    Pain   RN informed on pain level.    Location: left knee    At onset of session (out of 10): 3     OBJECTIVE     Cognitive Status   Level of Consciousness   - alert  Arousal Alertness   - appropriate responses to stimuli  Affect/Behavior    - calm, pleasant and cooperative  Orientation    - Oriented to: person, place, time and situation  Functional Communication   - Overall Communication Status: within functional limits    Vitals:  Denies lightheadedness or dizziness.     Patient Activity Tolerance: 1 to 1 activity to rest      Range of Motion (ROM)   (degrees unless noted; active unless noted; norms in ( ); negative=lacking to 0, positive=beyond 0)  Knee:   - Flexion (150):       Left:  99    - Extension (0-10):       Left:  -4   Comments: Measured ROM with hanging knee extension and supine heel slides. Patient limited due to pain and edema.         Bed Mobility  - Supine to sit: modified independent  - Sit to supine: modified independent  Transfers  Assistive devices: gait belt, 4-wheeled walker  - Sit to stand: modified independent  - Stand to sit: modified independent  - Stand pivot: modified independent  Patient with safe hand placement.  Patient with good eccentric control for stand to sit transfer.     Ambulation / Gait  - Weight Bearing:   Left: weight bearing as tolerated  - Assistive device: gait belt and 4-wheeled walker  - Distance (feet unless otherwise indicated): 200  - Assist Level: modified independent  - Surface: even  - Description: decreased maggi/pace and step through    Patient with minimal unloading through BUE onto 2WW.   Stair Ambulation  - Number of steps: 10  Ascend  - Assist  Level: supervison  - Pattern: step to leading right  - Railing: bilateral  - Assistive Device: gait belt  Patient recalled protective sequence with ascending.  Descend   - Assist Level: supervision  - Pattern: step to leading left  - Railing: bilateral  - Assistive Device: gait belt  Patient initially stepped down leading with right foot, while WB through BUE onto BL rails while taking WB off LLE. Therapist verbalized to descend with LLE first, patient then descended with no pain.     Interventions     Supine    Lower Extremity: Left: heel slides, quad sets, gluteus sets, ankle pumps, hip abduction, knee extension with bolster under heel, TKE over bolster and SLR, AROM, Supine LE reps: completed 3-5 reps for demonstration and understanding, explained to complete 10-15 reps 3 times a day at home. 1 sets  Seated    Lower Extremity: Left: knee flexion, hip adduction and knee extensions (explained sitting pushups in chair), AROM, Seated LE reps: completed 3-5 reps for demonstration and understanding, explained to complete 10-15 reps 3 times a day at home. 1 sets  Additional exercise details: Patient educated to sleep on his back and if he has to sleep on his side, then sleep on his right side with pillows between legs from pelvis to feet.   Patient asked about riding a stationary bike, therapist suggested to hold off until at least Monday (3/17) and when he does start using it, to do half circles back and forth until he has increased range to do a full revolution on the left side.   Training provided: HEP training, gait training, positioning, safety training, body mechanics, functional ambulation, activity tolerance, breathing/relaxation, stair training, use of assistive device, bed mobility training and transfer training    Skilled input: Verbal instruction/cues, as detailed above and tactile instruction/cues  Verbal Consent: Writer verbally educated and received verbal consent for hand placement, positioning of  Urinary obstruction.   Musculoskeletal: Negative for back pain, joint pain, myalgias and neck pain.   Skin: Negative for rash.   Neurological: Negative for dizziness, tingling, tremors, sensory change, speech change, focal weakness and headaches.   Psychiatric/Behavioral: Positive for hallucinations. Negative for substance abuse. The patient is nervous/anxious.    All other systems reviewed and are negative.       Physical Exam  Temp:  [36.1 °C (97 °F)-36.8 °C (98.2 °F)] 36.1 °C (97 °F)  Pulse:  [] 96  Resp:  [12-31] 12  BP: (104-143)/(64-94) 122/75  SpO2:  [93 %-100 %] 100 %    Physical Exam  Vitals signs reviewed.   Constitutional:       General: He is not in acute distress.  HENT:      Head: Normocephalic and atraumatic. No contusion.      Right Ear: External ear normal.      Left Ear: External ear normal.      Nose: Nose normal.      Mouth/Throat:      Mouth: Mucous membranes are dry.      Pharynx: No oropharyngeal exudate.   Eyes:      General:         Right eye: No discharge.         Left eye: No discharge.      Pupils: Pupils are equal, round, and reactive to light.   Neck:      Musculoskeletal: No neck rigidity or muscular tenderness.   Cardiovascular:      Rate and Rhythm: Normal rate and regular rhythm.      Heart sounds: No murmur. No friction rub. No gallop.    Pulmonary:      Effort: Pulmonary effort is normal.      Breath sounds: No wheezing or rhonchi.   Abdominal:      General: Bowel sounds are normal. There is no distension.      Palpations: Abdomen is soft.      Tenderness: There is no abdominal tenderness. There is no rebound.   Musculoskeletal: Normal range of motion.         General: No swelling or tenderness.   Skin:     General: Skin is warm and dry.      Coloration: Skin is not jaundiced.   Neurological:      General: No focal deficit present.      Mental Status: He is alert and oriented to person, place, and time.      Cranial Nerves: No cranial nerve deficit.      Sensory: No sensory  deficit.      Comments: Motor 5/5 in all 4 extremities  He was alert and oriented.   Psychiatric:         Mood and Affect: Mood normal.         Thought Content: Thought content is delusional.         Fluids    Intake/Output Summary (Last 24 hours) at 9/24/2020 1143  Last data filed at 9/24/2020 0800  Gross per 24 hour   Intake 1687.39 ml   Output 3000 ml   Net -1312.61 ml       Laboratory  Recent Labs     09/22/20  0330 09/23/20  0440   WBC 9.1 9.1   RBC 3.77* 3.67*   HEMOGLOBIN 10.6* 10.2*   HEMATOCRIT 33.0* 32.6*   MCV 87.5 88.8   MCH 28.1 27.8   MCHC 32.1* 31.3*   RDW 45.0 45.3   PLATELETCT 281 279   MPV 9.9 10.4     Recent Labs     09/22/20  0330 09/23/20  0440   SODIUM 147* 142   POTASSIUM 4.2 4.4   CHLORIDE 116* 114*   CO2 19* 19*   GLUCOSE 118* 119*   BUN 18 18   CREATININE 1.91* 1.60*   CALCIUM 9.1 8.9                   Imaging  US-RENAL   Final Result      1.  Bilateral hydronephrosis and hydroureter      2.  Marked post void residual in the bladder of 1078 mL      3.  This combination of findings is consistent with urinary retention      No follow up imaging is recommended per consensus guidelines of the 2019 ACR Incidental Findings Committee for probably benign incidental simple appearing renal cystic lesion(s) based on imaging criteria.      DX-CHEST-PORTABLE (1 VIEW)   Final Result      No acute cardiopulmonary abnormality.           Assessment/Plan  Alcohol withdrawal (HCC)- (present on admission)  Assessment & Plan  Continue CIWA protocol with Ativan.  Continuous cardiac monitoring  Monitor electrolytes and replace accordingly, particularly magnesium, potassium and phosphorus  Fall, seizure, aspiration precautions.  Thiamine folic acid and multivitamin.  Counseling was mentally clear.     Hypertensive urgency  Assessment & Plan  Admitted with hypertensive urgency, resolved   Continue amlodipine and lisinopril with hold parameters.  IV labetalol and hydralazine PRN with parameters.    SUKHWINDER (acute kidney  patient, and techniques to be performed today from patient   Home Exercise Program/Education Materials: Ice knee for 20 minutes for pain control, after your exercises, and when elevating your leg.   When elevating your leg, make sure that your leg is straight with no pillow under your knee. You want your toes above your nose.   Get up and walk for at least 5 minutes every hour that you are awake.   Stretch into resistance to gain motion, do not cause actual pain.   Perform your exercises at least 3 times per day.   Take pain medications as directed to control your pain. Do not try to 'tough it out'.   Any questions or concerns, please call [phone] and ask to speak to a physical therapist.          Education:   - Present and ready to learn: patient  Education provided during session:  - positioning, role of PT, use of assistive device, fall prevention, energy conservation, bed mobility techniques, proper body mechanics, icing and transfer technique  - Results of above outlined education: Verbalizes understanding and Demonstrates understanding    ASSESSMENT   Progress: goals met  Interfering components: decreased activity tolerance    Discharge needs based on today's assessment:   - Current level of function: slightly below baseline level of function   - Therapy needs at discharge: outpatient therapy 1-3 times per week   - Activities of daily living (ADLs) requiring support at discharge: stairs   - Instrumental activities of daily living (IADLs) requiring support at discharge: emergency responses   - Impairments that require further therapy intervention: activity tolerance and strength    AM-PAC  - Generalized Prior Level of Function: IND/MOD I ([facility] 22-24)       Key: MOD A=moderate assistance, IND/MOD I=independent/modified independent  - Generalized Current Level of Function     - Current Mobility Score: 23       AM-PAC Scoring Key= >21 Modified Independent; 20-21 Supervision; 18-19 Minimal assist; 13-17  Moderate assist; 9-12 Max assist; <9 Total assist    Patient does not want OP Physical Therapy at discharge, has all exercises from right knee replacement. Has follow up with MD in 2 weeks.       Pain at End of Session: RN informed on pain level  Pain: 3/10, location: left knee    PLAN (while hospitalized)  Suggestions for next session as indicated: PT Frequency: DC PT      PT/OT Mobility Equipment for Discharge: has 4ww.  Agreement to plan and goals: patient agrees with goals and treatment plan        GOALS  Long Term Goals: (to be met by time of discharge from hospital)  Sit to supine: Patient will complete sit to supine modified independent.  Status: met   Supine to sit: Patient will complete supine to sit modified independent.  Status: met   Sit to stand: Patient will complete sit to stand transfer with 4-wheeled walker, modified independent.   Status: met   Stand to sit: Patient will complete stand to sit transfer with 4-wheeled walker, modified independent.   Status: met   Ambulation (even): Patient will ambulate on even surface for 150 feet with 4-wheeled walker, modified independent.   Status: met   Stairs: Patient will ambulate 6 steps with using two rails, supervision.   Status: met   Home program: patient independent with home program as instructed.   Status: met   Documented in the chart in the following areas: Assessment/Plan.      Patient at End of Session:   Location: in bed  Safety measures: call light within reach and bed rails x2 (patient calls appropriately)  Handoff to: nurse      Therapy procedure time and total treatment time can be found documented on the Time Entry flowsheet   injury) (HCC)- (present on admission)  Assessment & Plan  Mostly due to dehydration   Avoid nephrotoxins, monitor inputs and outputs  He found to have urinary retention on ultrasound renal.  Saavedra in place.    Anemia, unspecified- (present on admission)  Assessment & Plan  No evidence of gross bleeding.  Continue to monitor transfuse for hemoglobin of less than or equal to 7.  Age-appropriate cancer screening has recommended   TSH and B12 are within normal limits per  Ordered iron panel.    Leukocytosis  Assessment & Plan  Likely reactive severe dehydration, hypertensive urgency   No focal sign of infection at this point   Now resolved.    Hypokalemia  Assessment & Plan  Continue to monitor in place as needed.    Uncontrolled diabetes mellitus (HCC)- (present on admission)  Assessment & Plan  With hyperglycemia ~720s  Glycated hemoglobin 9.5% nearly year ago  Long & short acting insulin, glargine increased 9/21/2020   This morning Accu-Chek showed blood glucose of 184  Continue to monitor closely.      Hypertension- (present on admission)  Assessment & Plan  Admitted with hypertensive urgency  Continue amlodipine and lisinopril with hold parameters.  PRN with hydralazine and IV labetalol with parameters.    Acute urinary obstruction  Assessment & Plan  Found to have acute urine obstruction on ultrasound renal.  Saavedra in place   Continue Flomax.  Discussion of care with bedside RN    VTE prophylaxis: SCDs due to hematuria.

## (undated) DEVICE — PACK LOWER EXTREMITY - (2/CA)

## (undated) DEVICE — DRESSING PETROLEUM GAUZE 5 X 9" (50EA/BX 4BX/CA)"

## (undated) DEVICE — CHLORAPREP 26 ML APPLICATOR - ORANGE TINT(25/CA)

## (undated) DEVICE — SPLINT PLASTER 5 IN X 30 IN - (50EA/BX 6BX/CA)

## (undated) DEVICE — WATER IRRIG. STER. 1500 ML - (9/CA)

## (undated) DEVICE — PAD PREP 24 X 48 CUFFED - (100/CA)

## (undated) DEVICE — HEAD HOLDER JUNIOR/ADULT

## (undated) DEVICE — KIT ROOM DECONTAMINATION

## (undated) DEVICE — ELECTRODE DUAL RETURN W/ CORD - (50/PK)

## (undated) DEVICE — BANDAGE ROLL STERILE BULKEE 4.5 IN X 4 YD (100EA/CA)

## (undated) DEVICE — SUTURE 3-0 VICRYL PLUS SH - 8X 18 INCH (12/BX)

## (undated) DEVICE — SUCTION INSTRUMENT YANKAUER BULBOUS TIP W/O VENT (50EA/CA)

## (undated) DEVICE — SET EXTENSION WITH 2 PORTS (48EA/CA) ***PART #2C8610 IS A SUBSTITUTE*****

## (undated) DEVICE — SPLINT PLASTER 4 IN  X 15 IN - (50/BX 12BX/CA)

## (undated) DEVICE — MASK ANESTHESIA ADULT  - (100/CA)

## (undated) DEVICE — GLOVE BIOGEL INDICATOR SZ 8.5 SURGICAL PF LTX - (50/BX 4BX/CA)

## (undated) DEVICE — GLOVE BIOGEL PI INDICATOR SZ 7.5 SURGICAL PF LF -(50/BX 4BX/CA)

## (undated) DEVICE — SUTURE GENERAL

## (undated) DEVICE — GOWN WARMING STANDARD FLEX - (30/CA)

## (undated) DEVICE — GLOVE BIOGEL ECLIPSE PF LATEX SIZE 8.0  (50PR/BX)

## (undated) DEVICE — SODIUM CHL IRRIGATION 0.9% 1000ML (12EA/CA)

## (undated) DEVICE — NEEDLE NON SAFETY HYPO 22 GA X 1 1/2 IN (100/BX)

## (undated) DEVICE — TUBING CLEARLINK DUO-VENT - C-FLO (48EA/CA)

## (undated) DEVICE — DRAPE LARGE 3 QUARTER - (20/CA)

## (undated) DEVICE — GLOVE SZ 7.5 BIOGEL PI MICRO - PF LF (50PR/BX)

## (undated) DEVICE — GLOVE BIOGEL SZ 8 SURGICAL PF LTX - (50PR/BX 4BX/CA)

## (undated) DEVICE — SUTURE 2-0 VICRYL PLUS CT-1 - 8 X 18 INCH(12/BX)

## (undated) DEVICE — SLEEVE, VASO, THIGH, MED

## (undated) DEVICE — KIT ANESTHESIA W/CIRCUIT & 3/LT BAG W/FILTER (20EA/CA)

## (undated) DEVICE — SET LEADWIRE 5 LEAD BEDSIDE DISPOSABLE ECG (1SET OF 5/EA)

## (undated) DEVICE — PADDING CAST 4 IN STERILE - 4 X 4 YDS (24/CA)

## (undated) DEVICE — SENSOR SPO2 NEO LNCS ADHESIVE (20/BX) SEE USER NOTES

## (undated) DEVICE — STOCKINET BIAS 4 IN STERILE - (20/CA)

## (undated) DEVICE — TOURNIQUET CUFF 34 X 4 ONE PORT DISP - STERILE (10/BX)

## (undated) DEVICE — SET IRRIGATION CYSTOSCOPY TUBE L80 IN (20EA/CA)

## (undated) DEVICE — GLOVE BIOGEL INDICATOR SZ 8 SURGICAL PF LTX - (50/BX 4BX/CA)

## (undated) DEVICE — PROTECTOR ULNA NERVE - (36PR/CA)

## (undated) DEVICE — SWAB ANAEROBIC SPEC.COLLECTOR - (25/PK 4PK/CA 100EA/CA)

## (undated) DEVICE — DRAPE C-ARM LARGE 41IN X 74 IN - (10/BX 2BX/CA)

## (undated) DEVICE — TUBE, CULTURE AEROBIC

## (undated) DEVICE — GOWN SURGEONS X-LARGE - DISP. (30/CA)

## (undated) DEVICE — LACTATED RINGERS INJ 1000 ML - (14EA/CA 60CA/PF)

## (undated) DEVICE — GLOVE BIOGEL SZ 7.5 SURGICAL PF LTX - (50PR/BX 4BX/CA)

## (undated) DEVICE — CANISTER SUCTION 3000ML MECHANICAL FILTER AUTO SHUTOFF MEDI-VAC NONSTERILE LF DISP  (40EA/CA)

## (undated) DEVICE — SUTURE 0 VICRYL PLUS CT-1 - 8 X 18 INCH (12/BX)

## (undated) DEVICE — ELECTRODE 850 FOAM ADHESIVE - HYDROGEL RADIOTRNSPRNT (50/PK)

## (undated) DEVICE — NEPTUNE 4 PORT MANIFOLD - (20/PK)

## (undated) DEVICE — GLOVE BIOGEL SZ 7 SURGICAL PF LTX - (50PR/BX 4BX/CA)